# Patient Record
Sex: MALE | Race: BLACK OR AFRICAN AMERICAN | NOT HISPANIC OR LATINO | Employment: OTHER | URBAN - METROPOLITAN AREA
[De-identification: names, ages, dates, MRNs, and addresses within clinical notes are randomized per-mention and may not be internally consistent; named-entity substitution may affect disease eponyms.]

---

## 2021-09-03 ENCOUNTER — OFFICE VISIT (OUTPATIENT)
Dept: FAMILY MEDICINE CLINIC | Facility: CLINIC | Age: 81
End: 2021-09-03
Payer: COMMERCIAL

## 2021-09-03 VITALS
HEIGHT: 74 IN | WEIGHT: 158.2 LBS | DIASTOLIC BLOOD PRESSURE: 62 MMHG | BODY MASS INDEX: 20.3 KG/M2 | OXYGEN SATURATION: 100 % | HEART RATE: 78 BPM | TEMPERATURE: 97.2 F | SYSTOLIC BLOOD PRESSURE: 90 MMHG | RESPIRATION RATE: 16 BRPM

## 2021-09-03 DIAGNOSIS — R42 DIZZINESS: Primary | ICD-10-CM

## 2021-09-03 DIAGNOSIS — Z76.89 ESTABLISHING CARE WITH NEW DOCTOR, ENCOUNTER FOR: ICD-10-CM

## 2021-09-03 DIAGNOSIS — E78.5 HYPERLIPIDEMIA, UNSPECIFIED HYPERLIPIDEMIA TYPE: ICD-10-CM

## 2021-09-03 DIAGNOSIS — I50.9 CONGESTIVE HEART FAILURE, UNSPECIFIED HF CHRONICITY, UNSPECIFIED HEART FAILURE TYPE (HCC): ICD-10-CM

## 2021-09-03 DIAGNOSIS — Z13.1 SCREENING FOR DIABETES MELLITUS: ICD-10-CM

## 2021-09-03 DIAGNOSIS — I25.2 HISTORY OF MI (MYOCARDIAL INFARCTION): ICD-10-CM

## 2021-09-03 DIAGNOSIS — Z12.5 SCREENING FOR PROSTATE CANCER: ICD-10-CM

## 2021-09-03 DIAGNOSIS — I10 HYPERTENSION, UNSPECIFIED TYPE: ICD-10-CM

## 2021-09-03 DIAGNOSIS — Z13.29 SCREENING FOR THYROID DISORDER: ICD-10-CM

## 2021-09-03 PROCEDURE — 93000 ELECTROCARDIOGRAM COMPLETE: CPT | Performed by: NURSE PRACTITIONER

## 2021-09-03 PROCEDURE — 1101F PT FALLS ASSESS-DOCD LE1/YR: CPT | Performed by: NURSE PRACTITIONER

## 2021-09-03 PROCEDURE — 1036F TOBACCO NON-USER: CPT | Performed by: NURSE PRACTITIONER

## 2021-09-03 PROCEDURE — 1160F RVW MEDS BY RX/DR IN RCRD: CPT | Performed by: NURSE PRACTITIONER

## 2021-09-03 PROCEDURE — 3725F SCREEN DEPRESSION PERFORMED: CPT | Performed by: NURSE PRACTITIONER

## 2021-09-03 PROCEDURE — 99204 OFFICE O/P NEW MOD 45 MIN: CPT | Performed by: NURSE PRACTITIONER

## 2021-09-03 RX ORDER — AMIODARONE HYDROCHLORIDE 200 MG/1
200 TABLET ORAL DAILY
COMMUNITY
End: 2022-02-23 | Stop reason: SDUPTHER

## 2021-09-03 RX ORDER — METOPROLOL SUCCINATE 25 MG/1
25 TABLET, EXTENDED RELEASE ORAL DAILY
COMMUNITY
End: 2022-03-29 | Stop reason: SDUPTHER

## 2021-09-03 RX ORDER — DAPAGLIFLOZIN 10 MG/1
10 TABLET, FILM COATED ORAL DAILY
COMMUNITY
End: 2021-12-17 | Stop reason: SDUPTHER

## 2021-09-03 RX ORDER — OMEPRAZOLE 40 MG/1
40 CAPSULE, DELAYED RELEASE ORAL AS NEEDED
COMMUNITY

## 2021-09-03 RX ORDER — ASPIRIN 81 MG/1
81 TABLET ORAL DAILY
COMMUNITY

## 2021-09-03 RX ORDER — SACUBITRIL AND VALSARTAN 49; 51 MG/1; MG/1
1 TABLET, FILM COATED ORAL 2 TIMES DAILY
COMMUNITY
End: 2022-06-27 | Stop reason: SDUPTHER

## 2021-09-03 RX ORDER — FINASTERIDE 5 MG/1
5 TABLET, FILM COATED ORAL DAILY
COMMUNITY
End: 2022-05-09 | Stop reason: SDUPTHER

## 2021-09-03 RX ORDER — ATORVASTATIN CALCIUM 20 MG/1
20 TABLET, FILM COATED ORAL
COMMUNITY
End: 2022-06-07 | Stop reason: SDUPTHER

## 2021-09-03 RX ORDER — DIGOXIN 250 MCG
250 TABLET ORAL DAILY
COMMUNITY
End: 2021-12-17 | Stop reason: DRUGHIGH

## 2021-09-03 NOTE — PROGRESS NOTES
Assessment/Plan:    No problem-specific Assessment & Plan notes found for this encounter  Diagnoses and all orders for this visit:    Dizziness  -     CBC and Platelet; Future  -     Comprehensive metabolic panel; Future  -     POCT ECG    Congestive heart failure, unspecified HF chronicity, unspecified heart failure type [facility])  -     Ambulatory referral to Cardiology; Future    History of MI (myocardial infarction)  -     Ambulatory referral to Cardiology; Future    Establishing care with new doctor, encounter for    Hyperlipidemia, unspecified hyperlipidemia type  -     Lipid panel; Future    Hypertension, unspecified type  -     Microalbumin / creatinine urine ratio; Future  -     Comprehensive metabolic panel; Future    Screening for diabetes mellitus  -     Hemoglobin A1C; Future    Screening for thyroid disorder  -     TSH, 3rd generation with Free T4 reflex; Future    Screening for prostate cancer  -     PSA, Total Screen; Future    Other orders  -     apixaban (Eliquis) 5 mg; Take 5 mg by mouth 2 (two) times a day  -     aspirin (ECOTRIN LOW STRENGTH) 81 mg EC tablet; Take 81 mg by mouth daily  -     amiodarone 200 mg tablet; Take 200 mg by mouth daily  -     finasteride (PROSCAR) 5 mg tablet; Take 5 mg by mouth daily  -     digoxin (LANOXIN) 0 25 mg tablet; Take 250 mcg by mouth daily  -     metoprolol succinate (TOPROL-XL) 25 mg 24 hr tablet; Take 25 mg by mouth daily  -     sacubitril-valsartan (Entresto) 49-51 MG TABS; Take 1 tablet by mouth 2 (two) times a day  -     atorvastatin (LIPITOR) 10 mg tablet; Take 10 mg by mouth daily at bedtime  -     omeprazole (PriLOSEC) 40 MG capsule; Take 40 mg by mouth daily  -     Dapagliflozin Propanediol (Farxiga) 10 MG TABS; Take 10 mg by mouth daily          Subjective:      Patient ID: Grace Mon is a 80 y o  male  Patient presents to office accompanied with daughter for establishment of care  Patient moved to area in June from [place]    Was being seen by PCP and cardiologist in [place], last visit was in May  Patient with history of HTN, hyperlipidemia, MI in 2013, cardiac defibrillator, and CHF  Patient denies any complaints related to chronic conditions  Notes he is still active and can perform his own ADLs  Patient denies recent falls or gait instability  Patient reports episodes of dizziness x's 2-3 weeks  Patient notes he was started on Farxiga 3 weeks ago and since then has been experiencing dizziness  Patient has home BP monitor and notes episodes of hypotension at home (SBP 90's)  Notes intermittent headaches as well  Denies sensation of room spinning, notes sensation of instability with dizziness  Dizziness is observed with changes in position  Patient denies chest pain, SOB  Denies blurry vision, unilateral weakness, slurred speech, syncope  Patient denies ear pain or tinnitus  The following portions of the patient's history were reviewed and updated as appropriate: allergies, current medications, past family history, past medical history, past social history, past surgical history and problem list     Review of Systems   Constitutional: Negative for activity change, chills, fatigue and fever  HENT: Negative for congestion, ear pain, hearing loss, tinnitus and trouble swallowing  Eyes: Negative for photophobia and visual disturbance  Respiratory: Negative for cough, chest tightness and shortness of breath  Cardiovascular: Negative for chest pain, palpitations and leg swelling  Gastrointestinal: Negative for abdominal pain, nausea and vomiting  Genitourinary: Positive for frequency (at night)  Negative for decreased urine volume, difficulty urinating, hematuria and urgency  Musculoskeletal: Negative for arthralgias, back pain, gait problem and myalgias  Skin: Negative for color change and rash  Neurological: Positive for dizziness and headaches   Negative for facial asymmetry, speech difficulty, weakness, light-headedness and numbness  Psychiatric/Behavioral: Negative for confusion and sleep disturbance  The patient is not nervous/anxious  Objective:      BP 90/62 (BP Location: Left arm, Patient Position: Standing)   Pulse 78   Temp (!) 97 2 °F (36 2 °C)   Resp 16   Ht 6' 2" (1 88 m)   Wt 71 8 kg (158 lb 3 2 oz)   SpO2 100%   BMI 20 31 kg/m²          Physical Exam  Vitals reviewed  Constitutional:       General: He is not in acute distress  Appearance: Normal appearance  He is not ill-appearing  HENT:      Head: Normocephalic and atraumatic  Right Ear: Tympanic membrane, ear canal and external ear normal       Left Ear: Tympanic membrane, ear canal and external ear normal       Nose: Nose normal       Mouth/Throat:      Mouth: Mucous membranes are moist       Pharynx: Oropharynx is clear  Eyes:      Extraocular Movements: Extraocular movements intact  Conjunctiva/sclera: Conjunctivae normal       Pupils: Pupils are equal, round, and reactive to light  Cardiovascular:      Rate and Rhythm: Normal rate and regular rhythm  Pulses:           Radial pulses are 2+ on the right side and 2+ on the left side  Heart sounds: Normal heart sounds  No murmur heard  Comments: POCT ECG: NSR, rate 65  Septal infarct, age undetermined  Inferior infarct, age undetermined  Pulmonary:      Effort: No respiratory distress  Breath sounds: Normal breath sounds  No wheezing  Abdominal:      General: Bowel sounds are normal       Palpations: Abdomen is soft  Tenderness: There is no abdominal tenderness  There is no right CVA tenderness or left CVA tenderness  Musculoskeletal:         General: Normal range of motion  Cervical back: Normal range of motion and neck supple  No rigidity or tenderness  Right lower leg: No edema  Left lower leg: No edema  Skin:     General: Skin is warm and dry     Neurological:      Mental Status: He is alert and oriented to person, place, and time     Psychiatric:         Mood and Affect: Mood normal          Behavior: Behavior normal

## 2021-09-03 NOTE — PATIENT INSTRUCTIONS
Stop drinking water 2 hours before bedtime  Check blood pressure first thing in the morning and an hour after taking [place]  Take it slow when changing positions (laying to sitting, sitting to standing)  Get blood work as ordered  Return in 2-3 weeks for re-evaluation  Dizziness, Ambulatory Care   GENERAL INFORMATION:   Dizziness  is a term used to describe a feeling of being off balance or unsteady  Common causes of dizziness are an inner ear fluid imbalance or a lack of oxygen in your blood  Your dizziness may be acute (lasts 3 days or less) or chronic (lasts longer than 3 days)  You may have dizzy spells that last from seconds to a few hours  Common symptoms related to dizziness include the following:   · A feeling that your surroundings are moving even though you are standing still    · Ringing in your ears or hearing loss     · Feeling faint or lightheaded     · Weakness or unsteadiness     · Double vision or eye movements you cannot control    · Nausea or vomiting     · Confusion  Seek immediate care for the following symptoms:   · You have a headache that does not go away with medicine  · You have shaking chills and a fever  · You vomit over and over with no relief  · Your vomit or bowel movements are red or black  · You have pain in your chest, back, or abdomen  · You have numbness, especially in your face, arms, or legs  · You have trouble moving your arms or legs  Treatment for dizziness  depends on the cause of your dizziness  You may need medicines to decrease your dizziness or nausea  You may need to be admitted to the hospital for treatment  Manage your symptoms:  Get up slowly from sitting or lying down  Do not drive or operate machinery when you are dizzy  Follow up with your healthcare provider as directed:  Write down your questions so you remember to ask them during your visits  CARE AGREEMENT:   You have the right to help plan your care   Learn about your health condition and how it may be treated  Discuss treatment options with your caregivers to decide what care you want to receive  You always have the right to refuse treatment  The above information is an  only  It is not intended as medical advice for individual conditions or treatments  Talk to your doctor, nurse or pharmacist before following any medical regimen to see if it is safe and effective for you  © 2014 [address] is for End User's use only and may not be sold, redistributed or otherwise used for commercial purposes  All illustrations and images included in CareNotes® are the copyrighted property of A D A M , Inc  or Yohan Swanson

## 2021-09-07 ENCOUNTER — APPOINTMENT (OUTPATIENT)
Dept: LAB | Facility: HOSPITAL | Age: 81
End: 2021-09-07
Payer: COMMERCIAL

## 2021-09-07 DIAGNOSIS — I10 HYPERTENSION, UNSPECIFIED TYPE: ICD-10-CM

## 2021-09-07 DIAGNOSIS — R42 DIZZINESS: ICD-10-CM

## 2021-09-07 DIAGNOSIS — Z13.29 SCREENING FOR THYROID DISORDER: ICD-10-CM

## 2021-09-07 DIAGNOSIS — Z12.5 SCREENING FOR PROSTATE CANCER: ICD-10-CM

## 2021-09-07 DIAGNOSIS — Z13.1 SCREENING FOR DIABETES MELLITUS: ICD-10-CM

## 2021-09-07 DIAGNOSIS — E78.5 HYPERLIPIDEMIA, UNSPECIFIED HYPERLIPIDEMIA TYPE: ICD-10-CM

## 2021-09-07 LAB
ALBUMIN SERPL BCP-MCNC: 3.7 G/DL (ref 3.5–5)
ALP SERPL-CCNC: 54 U/L (ref 46–116)
ALT SERPL W P-5'-P-CCNC: 35 U/L (ref 12–78)
ANION GAP SERPL CALCULATED.3IONS-SCNC: 6 MMOL/L (ref 4–13)
AST SERPL W P-5'-P-CCNC: 24 U/L (ref 5–45)
BILIRUB SERPL-MCNC: 0.81 MG/DL (ref 0.2–1)
BUN SERPL-MCNC: 18 MG/DL (ref 5–25)
CALCIUM SERPL-MCNC: 8.5 MG/DL (ref 8.3–10.1)
CHLORIDE SERPL-SCNC: 104 MMOL/L (ref 100–108)
CHOLEST SERPL-MCNC: 140 MG/DL (ref 50–200)
CO2 SERPL-SCNC: 28 MMOL/L (ref 21–32)
CREAT SERPL-MCNC: 1.2 MG/DL (ref 0.6–1.3)
CREAT UR-MCNC: 131 MG/DL
ERYTHROCYTE [DISTWIDTH] IN BLOOD BY AUTOMATED COUNT: 15.4 % (ref 11.6–15.1)
EST. AVERAGE GLUCOSE BLD GHB EST-MCNC: 97 MG/DL
GFR SERPL CREATININE-BSD FRML MDRD: 65 ML/MIN/1.73SQ M
GLUCOSE P FAST SERPL-MCNC: 92 MG/DL (ref 65–99)
HBA1C MFR BLD: 5 %
HCT VFR BLD AUTO: 40.9 % (ref 36.5–49.3)
HDLC SERPL-MCNC: 61 MG/DL
HGB BLD-MCNC: 12.6 G/DL (ref 12–17)
LDLC SERPL CALC-MCNC: 64 MG/DL (ref 0–100)
MCH RBC QN AUTO: 27.9 PG (ref 26.8–34.3)
MCHC RBC AUTO-ENTMCNC: 30.8 G/DL (ref 31.4–37.4)
MCV RBC AUTO: 91 FL (ref 82–98)
MICROALBUMIN UR-MCNC: 16 MG/L (ref 0–20)
MICROALBUMIN/CREAT 24H UR: 12 MG/G CREATININE (ref 0–30)
NONHDLC SERPL-MCNC: 79 MG/DL
PLATELET # BLD AUTO: 159 THOUSANDS/UL (ref 149–390)
PMV BLD AUTO: 12 FL (ref 8.9–12.7)
POTASSIUM SERPL-SCNC: 4.1 MMOL/L (ref 3.5–5.3)
PROT SERPL-MCNC: 8.5 G/DL (ref 6.4–8.2)
PSA SERPL-MCNC: 4.6 NG/ML (ref 0–4)
RBC # BLD AUTO: 4.51 MILLION/UL (ref 3.88–5.62)
SODIUM SERPL-SCNC: 138 MMOL/L (ref 136–145)
TRIGL SERPL-MCNC: 74 MG/DL
TSH SERPL DL<=0.05 MIU/L-ACNC: 3.21 UIU/ML (ref 0.36–3.74)
WBC # BLD AUTO: 3.59 THOUSAND/UL (ref 4.31–10.16)

## 2021-09-07 PROCEDURE — 82043 UR ALBUMIN QUANTITATIVE: CPT

## 2021-09-07 PROCEDURE — 85027 COMPLETE CBC AUTOMATED: CPT

## 2021-09-07 PROCEDURE — G0103 PSA SCREENING: HCPCS

## 2021-09-07 PROCEDURE — 84443 ASSAY THYROID STIM HORMONE: CPT

## 2021-09-07 PROCEDURE — 83036 HEMOGLOBIN GLYCOSYLATED A1C: CPT

## 2021-09-07 PROCEDURE — 80053 COMPREHEN METABOLIC PANEL: CPT

## 2021-09-07 PROCEDURE — 36415 COLL VENOUS BLD VENIPUNCTURE: CPT

## 2021-09-07 PROCEDURE — 80061 LIPID PANEL: CPT

## 2021-09-07 PROCEDURE — 82570 ASSAY OF URINE CREATININE: CPT

## 2021-09-10 PROCEDURE — 3288F FALL RISK ASSESSMENT DOCD: CPT | Performed by: NURSE PRACTITIONER

## 2021-09-17 ENCOUNTER — OFFICE VISIT (OUTPATIENT)
Dept: FAMILY MEDICINE CLINIC | Facility: CLINIC | Age: 81
End: 2021-09-17
Payer: COMMERCIAL

## 2021-09-17 VITALS
OXYGEN SATURATION: 100 % | SYSTOLIC BLOOD PRESSURE: 120 MMHG | RESPIRATION RATE: 18 BRPM | BODY MASS INDEX: 20.12 KG/M2 | DIASTOLIC BLOOD PRESSURE: 66 MMHG | WEIGHT: 156.8 LBS | HEART RATE: 74 BPM | TEMPERATURE: 96.8 F | HEIGHT: 74 IN

## 2021-09-17 DIAGNOSIS — Z00.00 ANNUAL PHYSICAL EXAM: Primary | ICD-10-CM

## 2021-09-17 DIAGNOSIS — N40.0 ENLARGED PROSTATE: ICD-10-CM

## 2021-09-17 DIAGNOSIS — E46 PROTEIN-CALORIE MALNUTRITION, UNSPECIFIED SEVERITY (HCC): ICD-10-CM

## 2021-09-17 DIAGNOSIS — Z00.00 HEALTHCARE MAINTENANCE: ICD-10-CM

## 2021-09-17 PROCEDURE — 99397 PER PM REEVAL EST PAT 65+ YR: CPT | Performed by: NURSE PRACTITIONER

## 2021-09-17 PROCEDURE — 1036F TOBACCO NON-USER: CPT | Performed by: NURSE PRACTITIONER

## 2021-09-17 PROCEDURE — 1160F RVW MEDS BY RX/DR IN RCRD: CPT | Performed by: NURSE PRACTITIONER

## 2021-09-17 RX ORDER — B-COMPLEX WITH VITAMIN C
1 TABLET ORAL DAILY
Qty: 90 TABLET | Refills: 1 | Status: SHIPPED | OUTPATIENT
Start: 2021-09-17 | End: 2022-06-07 | Stop reason: SDUPTHER

## 2021-09-17 NOTE — PATIENT INSTRUCTIONS

## 2021-09-17 NOTE — PROGRESS NOTES
[facility] [address] [place] [address]    NAME: Alex Cervantes  AGE: 80 y o  SEX: male  : 1940     DATE: 2021     Assessment and Plan:     Problem List Items Addressed This Visit        Other    Protein-calorie malnutrition, unspecified severity ([address] )      Other Visit Diagnoses     Annual physical exam    -  Primary    Enlarged prostate        Relevant Orders    Ambulatory referral to Urology    Healthcare maintenance        Relevant Medications    Multiple Vitamins-Minerals (multivitamin) tablet      Blood work reviewed  Immunizations and preventive care screenings were discussed with patient today  Appropriate education was printed on patient's after visit summary  Counseling:  Alcohol/drug use: discussed moderation in alcohol intake, the recommendations for healthy alcohol use, and avoidance of illicit drug use  Dental Health: discussed importance of regular tooth brushing, flossing, and dental visits  Injury prevention: discussed safety/seat belts, safety helmets, smoke detectors, carbon dioxide detectors, and smoking near bedding or upholstery  Sexual health: discussed sexually transmitted diseases, partner selection, use of condoms, avoidance of unintended pregnancy, and contraceptive alternatives  · Exercise: the importance of regular exercise/physical activity was discussed  Recommend exercise 3-5 times per week for at least 30 minutes  Return in about 3 months (around 2021) for Recheck  Chief Complaint:     Chief Complaint   Patient presents with    Follow-up      History of Present Illness:     Adult Annual Physical   Patient here for a comprehensive physical exam  The patient reports problems - wants to get off finasteride  Patient states he is having frequency at night and wants to stop taking finasteride    Was started on medication during a hospitalization in [place] where he was told he has an enlarged prostate  Patient was to have urology follow-up in [place], but moved prior to that  Diet and Physical Activity  · Diet/Nutrition: well balanced diet, low fat diet and consuming 3-5 servings of fruits/vegetables daily  · Exercise: no formal exercise  Depression Screening  PHQ-9 Depression Screening    PHQ-9:   Frequency of the following problems over the past two weeks:           General Health  · Sleep: gets 4-6 hours of sleep on average  · Hearing: significantly decreased - right and requires use of hearing aids  · Vision: most recent eye exam <1 year ago, wears glasses and wears glassses for reading only  · Dental: no dental visits for >1 year, brushes teeth once daily and flosses teeth occasionally   Health  · Symptoms include: nocturia     Review of Systems:     Review of Systems   Constitutional: Negative for activity change, appetite change, chills, fatigue and fever  HENT: Negative for congestion, ear pain, sore throat and trouble swallowing  Eyes: Negative for pain and visual disturbance  Respiratory: Negative for cough, chest tightness and shortness of breath  Cardiovascular: Negative for chest pain and palpitations  Gastrointestinal: Negative for abdominal pain, constipation, diarrhea, nausea and vomiting  Endocrine: Negative for polydipsia, polyphagia and polyuria  Genitourinary: Positive for frequency (at night)  Negative for decreased urine volume, difficulty urinating, dysuria, hematuria, testicular pain and urgency  Musculoskeletal: Negative for arthralgias, back pain and myalgias  Skin: Negative for color change and rash  Neurological: Negative for dizziness, syncope, weakness, light-headedness, numbness and headaches  Psychiatric/Behavioral: Negative for confusion  The patient is not nervous/anxious         Past Medical History:     Past Medical History:   Diagnosis Date    [facility]     Hyperlipidemia     Hypertension     [facility]my[facility] infarction) [facility]     Presence of cardiac defibrillator     Prostate disorder       Past Surgical History:     Past Surgical History:   Procedure Laterality Date    CORONARY ANGIOPLASTY WITH STENT PLACEMENT        Family History:     History reviewed  No pertinent family history  Social History:     Social History     Socioeconomic History    Marital status:      Spouse name: None    Number of children: None    Years of education: None    Highest education level: None   Occupational History    None   Tobacco Use    Smoking status: Never Smoker    Smokeless tobacco: Never Used   Vaping Use    Vaping Use: Never used   Substance and Sexual Activity    Alcohol use: Not Currently    Drug use: Never    Sexual activity: None   Other Topics Concern    None   Social History Narrative    None     Social Determinants of Health     Financial Resource Strain:     Difficulty of Paying Living Expenses:    Food Insecurity:     Worried About Running Out of Food in the Last Year:     Ran Out of Food in the Last Year:    Transportation Needs:     Lack of Transportation (Medical):      Lack of Transportation (Non-Medical):    Physical Activity:     Days of Exercise per Week:     Minutes of Exercise per Session:    Stress:     Feeling of Stress :    Social Connections:     Frequency of Communication with Friends and Family:     Frequency of Social Gatherings with Friends and Family:     Attends Buddhism Services:     Active Member of Clubs or Organizations:     Attends Club or Organization Meetings:     Marital Status:    Intimate Partner Violence:     Fear of Current or Ex-Partner:     Emotionally Abused:     Physically Abused:     Sexually Abused:       Current Medications:     Current Outpatient Medications   Medication Sig Dispense Refill    amiodarone 200 mg tablet Take 200 mg by mouth daily      apixaban (Eliquis) 5 mg Take 5 mg by mouth 2 (two) times a day      aspirin (ECOTRIN LOW STRENGTH) 81 mg EC tablet Take 81 mg by mouth daily      atorvastatin (LIPITOR) 20 mg tablet Take 20 mg by mouth daily at bedtime       Dapagliflozin Propanediol (Farxiga) 10 MG TABS Take 10 mg by mouth daily      digoxin (LANOXIN) 0 25 mg tablet Take 250 mcg by mouth daily      finasteride (PROSCAR) 5 mg tablet Take 5 mg by mouth daily      metoprolol succinate (TOPROL-XL) 25 mg 24 hr tablet Take 25 mg by mouth daily      omeprazole (PriLOSEC) 40 MG capsule Take 40 mg by mouth daily      sacubitril-valsartan (Entresto) 49-51 MG TABS Take 1 tablet by mouth 2 (two) times a day      Multiple Vitamins-Minerals (multivitamin) tablet Take 1 tablet by mouth daily 90 tablet 1     No current facility-administered medications for this visit  Allergies:     No Known Allergies   Physical Exam:     /66   Pulse 74   Temp (!) 96 8 °F (36 °C) (Tympanic)   Resp 18   Ht 6' 2" (1 88 m)   Wt 71 1 kg (156 lb 12 8 oz)   SpO2 100%   BMI 20 13 kg/m²     Physical Exam  Vitals reviewed  Constitutional:       General: He is not in acute distress  Appearance: Normal appearance  He is not ill-appearing  HENT:      Head: Normocephalic and atraumatic  Right Ear: Tympanic membrane, ear canal and external ear normal       Left Ear: Tympanic membrane, ear canal and external ear normal       Nose: Nose normal       Mouth/Throat:      Mouth: Mucous membranes are moist       Pharynx: Oropharynx is clear  Eyes:      Extraocular Movements: Extraocular movements intact  Conjunctiva/sclera: Conjunctivae normal       Pupils: Pupils are equal, round, and reactive to light  Neck:      Vascular: No carotid bruit  Cardiovascular:      Rate and Rhythm: Normal rate and regular rhythm  Pulses: Normal pulses  Heart sounds: Normal heart sounds  No murmur heard  Pulmonary:      Effort: Pulmonary effort is normal  No respiratory distress        Breath sounds: Normal breath sounds  No wheezing  Abdominal:      General: Abdomen is flat  Bowel sounds are normal       Palpations: Abdomen is soft  There is no mass  Tenderness: There is no abdominal tenderness  Hernia: No hernia is present  Musculoskeletal:         General: Normal range of motion  Cervical back: Normal range of motion  No rigidity or tenderness  Right lower leg: No edema  Left lower leg: No edema  Comments: Ambulates with cane   Skin:     General: Skin is warm and dry  Capillary Refill: Capillary refill takes less than 2 seconds  Neurological:      Mental Status: He is alert and oriented to person, place, and time     Psychiatric:         Mood and Affect: Mood normal          Behavior: Behavior normal           Abbie Barrera, [address] [facility] [address] [address] [facility]

## 2021-10-01 ENCOUNTER — OFFICE VISIT (OUTPATIENT)
Dept: UROLOGY | Facility: CLINIC | Age: 81
End: 2021-10-01
Payer: COMMERCIAL

## 2021-10-01 VITALS
RESPIRATION RATE: 16 BRPM | WEIGHT: 160 LBS | BODY MASS INDEX: 20.53 KG/M2 | TEMPERATURE: 98.3 F | HEART RATE: 69 BPM | HEIGHT: 74 IN | DIASTOLIC BLOOD PRESSURE: 88 MMHG | SYSTOLIC BLOOD PRESSURE: 150 MMHG

## 2021-10-01 DIAGNOSIS — N40.0 ENLARGED PROSTATE: Primary | ICD-10-CM

## 2021-10-01 LAB — POST-VOID RESIDUAL VOLUME, ML POC: 78 ML

## 2021-10-01 PROCEDURE — 99203 OFFICE O/P NEW LOW 30 MIN: CPT | Performed by: PHYSICIAN ASSISTANT

## 2021-10-01 PROCEDURE — 51798 US URINE CAPACITY MEASURE: CPT | Performed by: PHYSICIAN ASSISTANT

## 2021-10-01 RX ORDER — TAMSULOSIN HYDROCHLORIDE 0.4 MG/1
0.4 CAPSULE ORAL
Qty: 90 CAPSULE | Refills: 3 | Status: CANCELLED | OUTPATIENT
Start: 2021-10-01

## 2021-10-21 ENCOUNTER — CONSULT (OUTPATIENT)
Dept: CARDIOLOGY CLINIC | Facility: CLINIC | Age: 81
End: 2021-10-21
Payer: COMMERCIAL

## 2021-10-21 ENCOUNTER — TELEPHONE (OUTPATIENT)
Dept: CARDIOLOGY CLINIC | Facility: CLINIC | Age: 81
End: 2021-10-21

## 2021-10-21 VITALS
OXYGEN SATURATION: 98 % | DIASTOLIC BLOOD PRESSURE: 78 MMHG | BODY MASS INDEX: 20.53 KG/M2 | WEIGHT: 160 LBS | HEART RATE: 63 BPM | SYSTOLIC BLOOD PRESSURE: 123 MMHG | HEIGHT: 74 IN

## 2021-10-21 DIAGNOSIS — I48.0 PAROXYSMAL ATRIAL FIBRILLATION (HCC): ICD-10-CM

## 2021-10-21 DIAGNOSIS — E78.2 MIXED HYPERLIPIDEMIA: ICD-10-CM

## 2021-10-21 DIAGNOSIS — Z95.810 AICD (AUTOMATIC CARDIOVERTER/DEFIBRILLATOR) PRESENT: ICD-10-CM

## 2021-10-21 DIAGNOSIS — I25.10 ATHEROSCLEROSIS OF NATIVE CORONARY ARTERY OF NATIVE HEART WITHOUT ANGINA PECTORIS: Primary | ICD-10-CM

## 2021-10-21 DIAGNOSIS — I50.20 SYSTOLIC CONGESTIVE HEART FAILURE, UNSPECIFIED HF CHRONICITY (HCC): ICD-10-CM

## 2021-10-21 PROCEDURE — 1160F RVW MEDS BY RX/DR IN RCRD: CPT | Performed by: INTERNAL MEDICINE

## 2021-10-21 PROCEDURE — 1036F TOBACCO NON-USER: CPT | Performed by: INTERNAL MEDICINE

## 2021-10-21 PROCEDURE — 99204 OFFICE O/P NEW MOD 45 MIN: CPT | Performed by: INTERNAL MEDICINE

## 2021-11-12 ENCOUNTER — IN-CLINIC DEVICE VISIT (OUTPATIENT)
Dept: CARDIOLOGY CLINIC | Facility: CLINIC | Age: 81
End: 2021-11-12
Payer: COMMERCIAL

## 2021-11-12 DIAGNOSIS — Z95.810 PRESENCE OF IMPLANTABLE CARDIOVERTER-DEFIBRILLATOR (ICD): Primary | ICD-10-CM

## 2021-11-12 PROCEDURE — 93282 PRGRMG EVAL IMPLANTABLE DFB: CPT | Performed by: INTERNAL MEDICINE

## 2021-12-17 ENCOUNTER — OFFICE VISIT (OUTPATIENT)
Dept: FAMILY MEDICINE CLINIC | Facility: CLINIC | Age: 81
End: 2021-12-17
Payer: COMMERCIAL

## 2021-12-17 VITALS
SYSTOLIC BLOOD PRESSURE: 124 MMHG | BODY MASS INDEX: 21.05 KG/M2 | OXYGEN SATURATION: 99 % | WEIGHT: 164 LBS | DIASTOLIC BLOOD PRESSURE: 78 MMHG | HEIGHT: 74 IN | HEART RATE: 67 BPM

## 2021-12-17 DIAGNOSIS — R09.89 CHEST CONGESTION: Primary | ICD-10-CM

## 2021-12-17 DIAGNOSIS — I50.20 SYSTOLIC HEART FAILURE, UNSPECIFIED HF CHRONICITY (HCC): ICD-10-CM

## 2021-12-17 PROBLEM — I50.9 CONGESTIVE HEART FAILURE (HCC): Status: ACTIVE | Noted: 2021-12-17

## 2021-12-17 PROBLEM — E78.2 MIXED HYPERLIPIDEMIA: Status: ACTIVE | Noted: 2021-12-17

## 2021-12-17 PROBLEM — Z95.810 AICD (AUTOMATIC CARDIOVERTER/DEFIBRILLATOR) PRESENT: Status: ACTIVE | Noted: 2021-12-17

## 2021-12-17 PROBLEM — I48.0 PAROXYSMAL A-FIB (HCC): Status: ACTIVE | Noted: 2021-12-17

## 2021-12-17 PROBLEM — I25.10 ATHEROSCLEROSIS OF NATIVE CORONARY ARTERY OF NATIVE HEART WITHOUT ANGINA PECTORIS: Status: ACTIVE | Noted: 2021-12-17

## 2021-12-17 PROCEDURE — 99214 OFFICE O/P EST MOD 30 MIN: CPT | Performed by: NURSE PRACTITIONER

## 2021-12-17 RX ORDER — PREDNISONE 20 MG/1
40 TABLET ORAL DAILY
Qty: 10 TABLET | Refills: 0 | Status: SHIPPED | OUTPATIENT
Start: 2021-12-17 | End: 2021-12-22

## 2021-12-17 RX ORDER — DAPAGLIFLOZIN 10 MG/1
10 TABLET, FILM COATED ORAL DAILY
Qty: 90 TABLET | Refills: 3 | Status: SHIPPED | OUTPATIENT
Start: 2021-12-17

## 2021-12-17 RX ORDER — ATORVASTATIN CALCIUM 10 MG/1
TABLET, FILM COATED ORAL
COMMUNITY
Start: 2021-11-02 | End: 2021-12-17 | Stop reason: DRUGHIGH

## 2021-12-17 RX ORDER — DIGOXIN 125 MCG
125 TABLET ORAL DAILY
COMMUNITY
Start: 2021-10-02 | End: 2022-07-27 | Stop reason: SDUPTHER

## 2021-12-20 ENCOUNTER — TELEPHONE (OUTPATIENT)
Dept: FAMILY MEDICINE CLINIC | Facility: CLINIC | Age: 81
End: 2021-12-20

## 2021-12-21 ENCOUNTER — HOSPITAL ENCOUNTER (EMERGENCY)
Facility: HOSPITAL | Age: 81
Discharge: HOME | End: 2021-12-21
Attending: EMERGENCY MEDICINE
Payer: COMMERCIAL

## 2021-12-21 ENCOUNTER — APPOINTMENT (EMERGENCY)
Dept: RADIOLOGY | Facility: HOSPITAL | Age: 81
End: 2021-12-21
Payer: COMMERCIAL

## 2021-12-21 VITALS
BODY MASS INDEX: 21.05 KG/M2 | TEMPERATURE: 98.3 F | OXYGEN SATURATION: 99 % | WEIGHT: 164.02 LBS | DIASTOLIC BLOOD PRESSURE: 71 MMHG | SYSTOLIC BLOOD PRESSURE: 111 MMHG | HEIGHT: 74 IN | RESPIRATION RATE: 18 BRPM | HEART RATE: 65 BPM

## 2021-12-21 DIAGNOSIS — R07.9 CHEST PAIN, UNSPECIFIED TYPE: Primary | ICD-10-CM

## 2021-12-21 LAB
2HR DELTA HS TROPONIN: 1 NG/L
ALBUMIN SERPL BCP-MCNC: 3.6 G/DL (ref 3.5–5)
ALP SERPL-CCNC: 59 U/L (ref 46–116)
ALT SERPL W P-5'-P-CCNC: 68 U/L (ref 12–78)
ANION GAP SERPL CALCULATED.3IONS-SCNC: 8 MMOL/L (ref 4–13)
AST SERPL W P-5'-P-CCNC: 30 U/L (ref 5–45)
ATRIAL RATE: 64 BPM
ATRIAL RATE: 66 BPM
ATRIAL RATE: 72 BPM
BACTERIA UR QL AUTO: NORMAL /HPF
BASOPHILS # BLD AUTO: 0.01 THOUSANDS/ΜL (ref 0–0.1)
BASOPHILS NFR BLD AUTO: 0 % (ref 0–1)
BILIRUB SERPL-MCNC: 0.61 MG/DL (ref 0.2–1)
BILIRUB UR QL STRIP: NEGATIVE
BUN SERPL-MCNC: 22 MG/DL (ref 5–25)
CALCIUM SERPL-MCNC: 8.7 MG/DL (ref 8.3–10.1)
CARDIAC TROPONIN I PNL SERPL HS: 10 NG/L
CARDIAC TROPONIN I PNL SERPL HS: 9 NG/L
CHLORIDE SERPL-SCNC: 100 MMOL/L (ref 100–108)
CLARITY UR: CLEAR
CO2 SERPL-SCNC: 28 MMOL/L (ref 21–32)
COLOR UR: YELLOW
CREAT SERPL-MCNC: 1.27 MG/DL (ref 0.6–1.3)
EOSINOPHIL # BLD AUTO: 0 THOUSAND/ΜL (ref 0–0.61)
EOSINOPHIL NFR BLD AUTO: 0 % (ref 0–6)
ERYTHROCYTE [DISTWIDTH] IN BLOOD BY AUTOMATED COUNT: 14.9 % (ref 11.6–15.1)
GFR SERPL CREATININE-BSD FRML MDRD: 52 ML/MIN/1.73SQ M
GLUCOSE SERPL-MCNC: 150 MG/DL (ref 65–140)
GLUCOSE UR STRIP-MCNC: ABNORMAL MG/DL
HCT VFR BLD AUTO: 45.3 % (ref 36.5–49.3)
HGB BLD-MCNC: 14.2 G/DL (ref 12–17)
HGB UR QL STRIP.AUTO: NEGATIVE
IMM GRANULOCYTES # BLD AUTO: 0.02 THOUSAND/UL (ref 0–0.2)
IMM GRANULOCYTES NFR BLD AUTO: 0 % (ref 0–2)
KETONES UR STRIP-MCNC: NEGATIVE MG/DL
LEUKOCYTE ESTERASE UR QL STRIP: ABNORMAL
LYMPHOCYTES # BLD AUTO: 1.03 THOUSANDS/ΜL (ref 0.6–4.47)
LYMPHOCYTES NFR BLD AUTO: 13 % (ref 14–44)
MCH RBC QN AUTO: 27.7 PG (ref 26.8–34.3)
MCHC RBC AUTO-ENTMCNC: 31.3 G/DL (ref 31.4–37.4)
MCV RBC AUTO: 89 FL (ref 82–98)
MONOCYTES # BLD AUTO: 0.49 THOUSAND/ΜL (ref 0.17–1.22)
MONOCYTES NFR BLD AUTO: 6 % (ref 4–12)
NEUTROPHILS # BLD AUTO: 6.34 THOUSANDS/ΜL (ref 1.85–7.62)
NEUTS SEG NFR BLD AUTO: 81 % (ref 43–75)
NITRITE UR QL STRIP: NEGATIVE
NON-SQ EPI CELLS URNS QL MICRO: NORMAL /HPF
NRBC BLD AUTO-RTO: 0 /100 WBCS
P AXIS: 57 DEGREES
P AXIS: 57 DEGREES
P AXIS: 59 DEGREES
PH UR STRIP.AUTO: 6 [PH]
PLATELET # BLD AUTO: 178 THOUSANDS/UL (ref 149–390)
PMV BLD AUTO: 12.1 FL (ref 8.9–12.7)
POTASSIUM SERPL-SCNC: 4.3 MMOL/L (ref 3.5–5.3)
PR INTERVAL: 120 MS
PR INTERVAL: 158 MS
PR INTERVAL: 170 MS
PROT SERPL-MCNC: 7.8 G/DL (ref 6.4–8.2)
PROT UR STRIP-MCNC: NEGATIVE MG/DL
QRS AXIS: -51 DEGREES
QRS AXIS: -53 DEGREES
QRS AXIS: -56 DEGREES
QRSD INTERVAL: 108 MS
QRSD INTERVAL: 110 MS
QRSD INTERVAL: 112 MS
QT INTERVAL: 404 MS
QT INTERVAL: 440 MS
QT INTERVAL: 440 MS
QTC INTERVAL: 416 MS
QTC INTERVAL: 461 MS
QTC INTERVAL: 481 MS
RBC # BLD AUTO: 5.12 MILLION/UL (ref 3.88–5.62)
RBC #/AREA URNS AUTO: NORMAL /HPF
SODIUM SERPL-SCNC: 136 MMOL/L (ref 136–145)
SP GR UR STRIP.AUTO: 1.01 (ref 1–1.03)
T WAVE AXIS: 41 DEGREES
T WAVE AXIS: 41 DEGREES
T WAVE AXIS: 74 DEGREES
UROBILINOGEN UR QL STRIP.AUTO: 0.2 E.U./DL
VENTRICULAR RATE: 64 BPM
VENTRICULAR RATE: 66 BPM
VENTRICULAR RATE: 72 BPM
WBC # BLD AUTO: 7.89 THOUSAND/UL (ref 4.31–10.16)
WBC #/AREA URNS AUTO: NORMAL /HPF

## 2021-12-21 PROCEDURE — 93005 ELECTROCARDIOGRAM TRACING: CPT

## 2021-12-21 PROCEDURE — 99285 EMERGENCY DEPT VISIT HI MDM: CPT

## 2021-12-21 PROCEDURE — 99285 EMERGENCY DEPT VISIT HI MDM: CPT | Performed by: PHYSICIAN ASSISTANT

## 2021-12-21 PROCEDURE — 84484 ASSAY OF TROPONIN QUANT: CPT

## 2021-12-21 PROCEDURE — 85025 COMPLETE CBC W/AUTO DIFF WBC: CPT

## 2021-12-21 PROCEDURE — 81001 URINALYSIS AUTO W/SCOPE: CPT | Performed by: PHYSICIAN ASSISTANT

## 2021-12-21 PROCEDURE — 93010 ELECTROCARDIOGRAM REPORT: CPT | Performed by: INTERNAL MEDICINE

## 2021-12-21 PROCEDURE — 71045 X-RAY EXAM CHEST 1 VIEW: CPT

## 2021-12-21 PROCEDURE — 36415 COLL VENOUS BLD VENIPUNCTURE: CPT

## 2021-12-21 PROCEDURE — 80053 COMPREHEN METABOLIC PANEL: CPT

## 2022-01-14 ENCOUNTER — IMMUNIZATIONS (OUTPATIENT)
Dept: FAMILY MEDICINE CLINIC | Facility: HOSPITAL | Age: 82
End: 2022-01-14

## 2022-01-14 DIAGNOSIS — Z23 ENCOUNTER FOR IMMUNIZATION: Primary | ICD-10-CM

## 2022-01-14 PROCEDURE — 91300 COVID-19 PFIZER VACC 0.3 ML: CPT

## 2022-01-14 PROCEDURE — 0001A COVID-19 PFIZER VACC 0.3 ML: CPT

## 2022-01-30 NOTE — PROGRESS NOTES
2/4/2022      Chief Complaint   Patient presents with    Benign Prostatic Hypertrophy         Assessment and Plan    80 y o  male     1  BPH with LUTS  - Using finasteride monotherapy  - patient states his symptoms have worsened since starting [address]  Discussed this medication does cause excess sugar to be excreted through the urine in could be contributing to his symptoms  Will discuss other medication options with cardiologist when he sees them on 02/17/2022  - Discussed workup of bladder outlet obstruction including cystoscopy and TRUS vs addition of Flomax  Patient would like to try Flomax in addition to his finasteride monotherapy  Medication and side effects reviewed  Prescription sent to pharmacy  - Patient will follow up in 3 months for symptom reassessment  - Call with any questions or concerns   - All questions answered; patient understands and agrees with plan        History of Present Illness  Verlyn Councilman is a 80 y o  male patient with history of BPH with LUTS here for follow up  Patient currently taking finasteride monotherapy with only minimal benefit  States he does continue have bothersome symptoms including urinary frequency, urgency, and nocturia  Patient states he noticed his urinary symptoms have worsened since starting [address] last year  Patient is on this medication for systolic heart failure  Patient had urine testing performed in December which shows large amounts of glucose from this medication  Denies gross hematuria, dysuria, fever, chills, nausea, vomiting  Patient states he has noticed decreased volume of ejaculate since starting finasteride  Denies any other side effects from medication  Review of Systems   Constitutional: Negative for activity change, appetite change, chills and fever  HENT: Negative for congestion, ear pain, sore throat and trouble swallowing  Eyes: Negative for pain and visual disturbance     Respiratory: Negative for cough and shortness of breath  Cardiovascular: Negative for chest pain, palpitations and leg swelling  Gastrointestinal: Negative for abdominal pain, constipation, diarrhea, nausea and vomiting  Genitourinary: Positive for frequency  Negative for difficulty urinating, dysuria, flank pain, hematuria and urgency  Musculoskeletal: Negative for arthralgias, back pain and gait problem  Skin: Negative for color change, rash and wound  Allergic/Immunologic: Negative for immunocompromised state  Neurological: Negative for dizziness, seizures and syncope  Hematological: Does not bruise/bleed easily  Psychiatric/Behavioral: Negative for confusion  All other systems reviewed and are negative  Vitals  Vitals:    02/04/22 0903   BP: 118/60   Weight: 74 4 kg (164 lb)   Height: 6' 2" (1 88 m)       Physical Exam  Constitutional:       General: He is not in acute distress  Appearance: Normal appearance  He is not ill-appearing, toxic-appearing or diaphoretic  HENT:      Head: Normocephalic  Nose: No congestion  Eyes:      General: No scleral icterus  Right eye: No discharge  Left eye: No discharge  Conjunctiva/sclera: Conjunctivae normal       Pupils: Pupils are equal, round, and reactive to light  Pulmonary:      Effort: Pulmonary effort is normal    Musculoskeletal:      Cervical back: Normal range of motion  Skin:     General: Skin is warm and dry  Coloration: Skin is not jaundiced or pale  Findings: No bruising, erythema, lesion or rash  Neurological:      General: No focal deficit present  Mental Status: He is alert and oriented to person, place, and time  Mental status is at baseline  Gait: Gait normal    Psychiatric:         Mood and Affect: Mood normal          Behavior: Behavior normal          Thought Content:  Thought content normal          Judgment: Judgment normal            Past History  Past Medical History:   Diagnosis Date    [facility]est[facility] failure) ([facility] )     Hyperlipidemia     Hypertension     MI [facility]myocardial infarction) ([facility] )     Presence of cardiac defibrillator     Prostate disorder      Social History     Socioeconomic History    Marital status:      Spouse name: None    Number of children: None    Years of education: None    Highest education level: None   Occupational History    None   Tobacco Use    Smoking status: Never Smoker    Smokeless tobacco: Never Used   Vaping Use    Vaping Use: Never used   Substance and Sexual Activity    Alcohol use: Not Currently    Drug use: Never    Sexual activity: None   Other Topics Concern    None   Social History Narrative    None     Social Determinants of Health     Financial Resource Strain: Not on file   Food Insecurity: Not on file   Transportation Needs: Not on file   Physical Activity: Not on file   Stress: Not on file   Social Connections: Not on file   Intimate Partner Violence: Not on file   Housing Stability: Not on file     Social History     Tobacco Use   Smoking Status Never Smoker   Smokeless Tobacco Never Used     History reviewed  No pertinent family history      The following portions of the patient's history were reviewed and updated as appropriate: allergies, current medications, past medical history, past social history, past surgical history and problem list     Results  No results found for this or any previous visit (from the past 1 hour(s)) ]  Lab Results   Component Value Date    PSA 4 6 (H) 09/07/2021     Lab Results   Component Value Date    CALCIUM 8 7 12/21/2021    K 4 3 12/21/2021    CO2 28 12/21/2021     12/21/2021    BUN 22 12/21/2021    CREATININE 1 27 12/21/2021     Lab Results   Component Value Date    WBC 7 89 12/21/2021    HGB 14 2 12/21/2021    HCT 45 3 12/21/2021    MCV 89 12/21/2021     12/21/2021       Alexx Mendez PA-C

## 2022-02-04 ENCOUNTER — OFFICE VISIT (OUTPATIENT)
Dept: UROLOGY | Facility: CLINIC | Age: 82
End: 2022-02-04
Payer: COMMERCIAL

## 2022-02-04 VITALS
HEIGHT: 74 IN | WEIGHT: 164 LBS | BODY MASS INDEX: 21.05 KG/M2 | SYSTOLIC BLOOD PRESSURE: 118 MMHG | DIASTOLIC BLOOD PRESSURE: 60 MMHG

## 2022-02-04 DIAGNOSIS — N40.0 ENLARGED PROSTATE: Primary | ICD-10-CM

## 2022-02-04 PROCEDURE — 99213 OFFICE O/P EST LOW 20 MIN: CPT | Performed by: PHYSICIAN ASSISTANT

## 2022-02-04 RX ORDER — TAMSULOSIN HYDROCHLORIDE 0.4 MG/1
0.4 CAPSULE ORAL
Qty: 30 CAPSULE | Refills: 5 | Status: SHIPPED | OUTPATIENT
Start: 2022-02-04 | End: 2022-05-09 | Stop reason: SDUPTHER

## 2022-02-04 RX ORDER — ATORVASTATIN CALCIUM 10 MG/1
TABLET, FILM COATED ORAL
COMMUNITY
Start: 2022-01-19 | End: 2022-03-29 | Stop reason: SDUPTHER

## 2022-02-17 ENCOUNTER — TELEPHONE (OUTPATIENT)
Dept: CARDIOLOGY CLINIC | Facility: CLINIC | Age: 82
End: 2022-02-17

## 2022-02-17 ENCOUNTER — REMOTE DEVICE CLINIC VISIT (OUTPATIENT)
Dept: CARDIOLOGY CLINIC | Facility: CLINIC | Age: 82
End: 2022-02-17
Payer: COMMERCIAL

## 2022-02-17 DIAGNOSIS — Z95.810 PRESENCE OF AUTOMATIC CARDIOVERTER/DEFIBRILLATOR (AICD): Primary | ICD-10-CM

## 2022-02-17 PROCEDURE — 93296 REM INTERROG EVL PM/IDS: CPT | Performed by: INTERNAL MEDICINE

## 2022-02-17 PROCEDURE — 93295 DEV INTERROG REMOTE 1/2/MLT: CPT | Performed by: INTERNAL MEDICINE

## 2022-02-17 NOTE — TELEPHONE ENCOUNTER
Spoke with pt and he said his wt is 165 Lbs  He has gained a little in the last 4-6 mths( 158 llb) , but not the last 2 weeks  He denies SOB, Edema , or abd bloating  He did want you to know he has not gotten Echo yet because insurance wants him to have it done at the hospital  Please call him back with Central scheduling # in about several hours  He is out right now        Thank you

## 2022-02-17 NOTE — PROGRESS NOTES
Results for orders placed or performed in visit on 02/17/22   Cardiac EP device report    Narrative    SJM SINGLE ICD/NOT MRI CONDITIONAL  MERLIN TRANSMISSION: BATTERY VOLTAGE ADEQUATE (2 4 YRS)  : <1%  ALL AVAILABLE LEAD PARAMETERS WITHIN NORMAL LIMITS  NO SIGNIFICANT HIGH RATE EPISODES  CORVUE IMPEDANCE THRESHOLD CROSSED X 11 DAYS  PT DOES NOT TAKE ANY DIURTICS  PT TAKES DIGOXIN, AMIODARONE, ELIQUIS, ASA 81MG, METOPROLOL SUCC  NO EF IN EPIC  TASK TO HF TEAM  APPROPRIATELY FUNCTIONING ICD    [address] [place] [address]

## 2022-02-17 NOTE — TELEPHONE ENCOUNTER
----- Message from Debbi Bryan RN sent at 2/17/2022 12:01 PM EST -----  Regarding: FW: cor-holly crossed    ----- Message -----  From: Alayna Laureano  Sent: 2/17/2022  11:09 AM EST  To: Debbi Bryan RN  Subject: cor-holly crossed                                  Ray Cummings,  Pts cor-holly crossed x 11 days, pt does not take any diuretics  Pt takes dogoxin, amiodarone, eliquis, asa 81mg, metoprolol succ  No EF in epic  Thanks,  ~Cheryl MERLIN TRANSMISSION: BATTERY VOLTAGE ADEQUATE (2 4 YRS)  : <1%  ALL AVAILABLE LEAD PARAMETERS WITHIN NORMAL LIMITS  NO SIGNIFICANT HIGH RATE EPISODES  CORVUE IMPEDANCE THRESHOLD CROSSED X 11 DAYS  PT DOES NOT TAKE ANY DIURTICS  PT TAKES DIGOXIN, AMIODARONE, ELIQUIS, ASA 81MG, METOPROLOL SUCC  NO EF IN EPIC  TASK TO HF TEAM  APPROPRIATELY FUNCTIONING ICD    [address]

## 2022-02-17 NOTE — TELEPHONE ENCOUNTER
----- Message from [facility] sent at 2/17/2022 11:08 AM EST -----  Regarding: cor-holly crossed  Dalila Rodriguez,  Pts cor-holly crossed x 11 days, pt does not take any diuretics  Pt takes dogoxin, amiodarone, eliquis, asa 81mg, metoprolol succ  No EF in epic  Thanks,  ~Cheryl MERLIN TRANSMISSION: BATTERY VOLTAGE ADEQUATE (2 4 YRS)  : <1%  ALL AVAILABLE LEAD PARAMETERS WITHIN NORMAL LIMITS  NO SIGNIFICANT HIGH RATE EPISODES  CORVUE IMPEDANCE THRESHOLD CROSSED X 11 DAYS  PT DOES NOT TAKE ANY DIURTICS  PT TAKES DIGOXIN, AMIODARONE, ELIQUIS, ASA 81MG, METOPROLOL SUCC  NO EF IN EPIC  TASK TO HF TEAM  APPROPRIATELY FUNCTIONING ICD    [address]

## 2022-02-17 NOTE — TELEPHONE ENCOUNTER
Spoke to the patient and daughter over the phone    Patient complain of please schedule his visit with me next week on wednesday

## 2022-02-23 ENCOUNTER — OFFICE VISIT (OUTPATIENT)
Dept: CARDIOLOGY CLINIC | Facility: CLINIC | Age: 82
End: 2022-02-23
Payer: COMMERCIAL

## 2022-02-23 VITALS
BODY MASS INDEX: 21.17 KG/M2 | SYSTOLIC BLOOD PRESSURE: 106 MMHG | HEIGHT: 74 IN | HEART RATE: 62 BPM | OXYGEN SATURATION: 98 % | WEIGHT: 165 LBS | RESPIRATION RATE: 16 BRPM | DIASTOLIC BLOOD PRESSURE: 72 MMHG

## 2022-02-23 DIAGNOSIS — E78.2 MIXED HYPERLIPIDEMIA: ICD-10-CM

## 2022-02-23 DIAGNOSIS — I25.10 ATHEROSCLEROSIS OF NATIVE CORONARY ARTERY OF NATIVE HEART WITHOUT ANGINA PECTORIS: Primary | ICD-10-CM

## 2022-02-23 DIAGNOSIS — I48.0 PAROXYSMAL A-FIB (HCC): ICD-10-CM

## 2022-02-23 DIAGNOSIS — Z95.810 AICD (AUTOMATIC CARDIOVERTER/DEFIBRILLATOR) PRESENT: ICD-10-CM

## 2022-02-23 PROCEDURE — 1036F TOBACCO NON-USER: CPT | Performed by: INTERNAL MEDICINE

## 2022-02-23 PROCEDURE — 1160F RVW MEDS BY RX/DR IN RCRD: CPT | Performed by: INTERNAL MEDICINE

## 2022-02-23 PROCEDURE — 99214 OFFICE O/P EST MOD 30 MIN: CPT | Performed by: INTERNAL MEDICINE

## 2022-02-23 RX ORDER — AMIODARONE HYDROCHLORIDE 200 MG/1
200 TABLET ORAL DAILY
Qty: 90 TABLET | Refills: 3 | Status: SHIPPED | OUTPATIENT
Start: 2022-02-23

## 2022-02-23 NOTE — PROGRESS NOTES
[facility] [address] [address]  Cardiology Follow Up    Alex Cervantes  1940  [id number]      1  Atherosclerosis of native coronary artery of native heart without angina pectoris  amiodarone 200 mg tablet   2  AICD (automatic cardioverter/defibrillator) present  amiodarone 200 mg tablet   3  Paroxysmal A-fib (HCC)  amiodarone 200 mg tablet   4  Mixed hyperlipidemia         Chief Complaint   Patient presents with    Follow-up       Interval History:   51-year-old male with coronary artery status post MI in 2013 status post PCI details unknown, heart failure status post AICD at the same time in 2013 at [facility] in [place], hyperlipidemia, AFib on Eliquis presented for further evaluation after AICD interrogation showed optivolume cross    Patient had AICD interrogated on 02/17/2022 which showed normally functioning device with ventricular paced less than 1% battery life 2 4 years  It showed core view impedance threshold crossed for 11 days  Patient at present time denies chest pain, shortness of breath, palpitation, dizziness, orthopnea, leg edema or loss of consciousness  He denies any chest pain or shortness of breath on ambulation  He had experience dizziness mostly during postural change  No dizziness during ambulation but no arrhythmia on pacemaker interrogation  He was recently started on Connie Stapler by primary care  As per patient home blood pressure stays around 114/60-70    No recent labs available for review  Current medications reviewed    (from initial clinic visit: patient has been [place] cardi[place]  He has seen his primary cardiologist before 6 months  at present time patient is doing okay  He denies any chest pain, shortness of breath, palpitation, dizziness, orthopnea, leg edema or loss of consciousness    He has occasionally noted fatigue on exertion   For last year and half patient has lost almost 24 lb after his wife   He has regained few  Lb since then and is more active after he moved to [place]  no chest pain or shortness of breath on exertion      As per patient his ICD was interrogated during last clinic visit with primary cardiologist in [place]   ( patient has AICD shock  Before 5 year) He also had a stress test before 6 months and as per patient everything was okay on stress test)    Patient had echo ordered on last visit but not done as it is not covered at [facility] facility  I did speak to vascular testing  staff and they will look into it    Review of Systems: All review of system negative except as mentioned above  Patient Active Problem List   Diagnosis    Protein-calorie malnutrition, unspecified severity ([facility] )    Atherosclerosis of native coronary artery of native heart without angina pectoris    Paroxysmal A-fib ([facility] )    Mixed hyperlipidemia    AICD (automatic cardioverter/defibrillator) present    Systolic heart failure ([facility] )     Past Medical History:   Diagnosis Date    [facility] failure) ([facility])     Hyperlipidemia     Hypertension     MI (myocardial infarction) ([facility] )     Presence of cardiac defibrillator     Prostate disorder      Social History     Socioeconomic History    Marital status:       Spouse name: Not on file    Number of children: Not on file    Years of education: Not on file    Highest education level: Not on file   Occupational History    Not on file   Tobacco Use    Smoking status: Never Smoker    Smokeless tobacco: Never Used   Vaping Use    Vaping Use: Never used   Substance and Sexual Activity    Alcohol use: Not Currently    Drug use: Never    Sexual activity: Not on file   Other Topics Concern    Not on file   Social History Narrative    Not on file     Social Determinants of Health     Financial Resource Strain: Not on file   Food Insecurity: Not on file   Transportation Needs: Not on file   Physical Activity: Not on file   Stress: Not on file   Social Connections: Not on file   Intimate Partner Violence: Not on file   Housing Stability: Not on file      History reviewed  No pertinent family history    Past Surgical History:   Procedure Laterality Date    CORONARY ANGIOPLASTY WITH STENT PLACEMENT         Current Outpatient Medications:     amiodarone 200 mg tablet, Take 1 tablet (200 mg total) by mouth daily, Disp: 90 tablet, Rfl: 3    apixaban (Eliquis) 5 mg, Take 5 mg by mouth 2 (two) times a day, Disp: , Rfl:     aspirin (ECOTRIN LOW STRENGTH) 81 mg EC tablet, Take 81 mg by mouth daily, Disp: , Rfl:     atorvastatin (LIPITOR) 20 mg tablet, Take 20 mg by mouth daily at bedtime , Disp: , Rfl:     Dapagliflozin Propanediol (Farxiga) 10 MG TABS, Take 1 tablet (10 mg total) by mouth daily, Disp: 90 tablet, Rfl: 3    digoxin (LANOXIN) 0 125 mg tablet, Take 125 mcg by mouth daily  , Disp: , Rfl:     finasteride (PROSCAR) 5 mg tablet, Take 5 mg by mouth daily, Disp: , Rfl:     metoprolol succinate (TOPROL-XL) 25 mg 24 hr tablet, Take 25 mg by mouth daily, Disp: , Rfl:     Multiple Vitamins-Minerals (multivitamin) tablet, Take 1 tablet by mouth daily, Disp: 90 tablet, Rfl: 1    omeprazole (PriLOSEC) 40 MG capsule, Take 40 mg by mouth daily, Disp: , Rfl:     sacubitril-valsartan (Entresto) 49-51 MG TABS, Take 1 tablet by mouth 2 (two) times a day, Disp: , Rfl:     tamsulosin (FLOMAX) 0 4 mg, Take 1 capsule (0 4 mg total) by mouth daily with dinner, Disp: 30 capsule, Rfl: 5    atorvastatin (LIPITOR) 10 mg tablet, , Disp: , Rfl:   No Known Allergies    Labs:  Admission on 12/21/2021, Discharged on 12/21/2021   Component Date Value    WBC 12/21/2021 7 89     RBC 12/21/2021 5 12     Hemoglobin 12/21/2021 14 2     Hematocrit 12/21/2021 45 3     MCV 12/21/2021 89     MCH 12/21/2021 27 7     MCHC 12/21/2021 31 3*    RDW 12/21/2021 14 9     MPV 12/21/2021 12 1     Platelets 52/46/2893 178     nRBC 12/21/2021 0     Neutrophils Relative 12/21/2021 81*    Immat GRANS % 12/21/2021 0     Lymphocytes Relative 12/21/2021 13*    Monocytes Relative 12/21/2021 6     Eosinophils Relative 12/21/2021 0     Basophils Relative 12/21/2021 0     Neutrophils Absolute 12/21/2021 6 34     Immature Grans Absolute 12/21/2021 0 02     Lymphocytes Absolute 12/21/2021 1 03     Monocytes Absolute 12/21/2021 0 49     Eosinophils Absolute 12/21/2021 0 00     Basophils Absolute 12/21/2021 0 01     Sodium 12/21/2021 136     Potassium 12/21/2021 4 3     Chloride 12/21/2021 100     CO2 12/21/2021 28     ANION GAP 12/21/2021 8     BUN 12/21/2021 22     Creatinine 12/21/2021 1 27     Glucose 12/21/2021 150*    Calcium 12/21/2021 8 7     AST 12/21/2021 30     ALT 12/21/2021 68     Alkaline Phosphatase 12/21/2021 59     Total Protein 12/21/2021 7 8     Albumin 12/21/2021 3 6     Total Bilirubin 12/21/2021 0 61     eGFR 12/21/2021 52     hs TnI 0hr 12/21/2021 9     hs TnI 2hr 12/21/2021 10     Delta 2hr hsTnI 12/21/2021 1     Color, UA 12/21/2021 Yellow     Clarity, UA 12/21/2021 Clear     Specific Gravity, UA 12/21/2021 1 010     pH, UA 12/21/2021 6 0     Leukocytes, UA 12/21/2021 Elevated glucose may cause decreased leukocyte values   See urine microscopic for [facility] result/*    Nitrite, UA 12/21/2021 Negative     Protein, UA 12/21/2021 Negative     Glucose, UA 12/21/2021 >=1000 (1%)*    Ketones, UA 12/21/2021 Negative     Urobilinogen, UA 12/21/2021 0 2     Bilirubin, UA 12/21/2021 Negative     Blood, UA 12/21/2021 Negative     RBC, UA 12/21/2021 None Seen     WBC, UA 12/21/2021 None Seen     Epithelial Cells 12/21/2021 None Seen     Bacteria, UA 12/21/2021 None Seen     Ventricular Rate 12/21/2021 66     Atrial Rate 12/21/2021 66     IN Interval 12/21/2021 170     QRSD Interval 12/21/2021 110     QT Interval 12/21/2021 440     QTC Interval 12/21/2021 461     P Axis 12/21/2021 57     QRS Axis 12/21/2021 -46     T Wave Axis 12/21/2021 74     Ventricular Rate 12/21/2021 64     Atrial Rate 12/21/2021 64     TX Interval 12/21/2021 158     QRSD Interval 12/21/2021 112     QT Interval 12/21/2021 404     QTC Interval 12/21/2021 416     P Axis 12/21/2021 59     QRS Axis 12/21/2021 -48     T Wave Axis 12/21/2021 41     Ventricular Rate 12/21/2021 72     Atrial Rate 12/21/2021 72     TX Interval 12/21/2021 120     QRSD Interval 12/21/2021 108     QT Interval 12/21/2021 440     QTC Interval 12/21/2021 481     P Axis 12/21/2021 57     QRS Axis 12/21/2021 -64     T Wave Axis 12/21/2021 41    Office Visit on 10/01/2021   Component Date Value    POST-VOID RESIDUAL VOLUM* 10/01/2021 78    Appointment on 09/07/2021   Component Date Value    PSA 09/07/2021 4 6*    Creatinine, Ur 09/07/2021 131 0     Microalbum  ,U,Random 09/07/2021 16 0     Microalb Creat Ratio 09/07/2021 12     Hemoglobin A1C 09/07/2021 5 0     EAG 09/07/2021 97     WBC 09/07/2021 3 59*    RBC 09/07/2021 4 51     Hemoglobin 09/07/2021 12 6     Hematocrit 09/07/2021 40 9     MCV 09/07/2021 91     MCH 09/07/2021 27 9     MCHC 09/07/2021 30 8*    RDW 09/07/2021 15 4*    Platelets 99/02/2757 159     MPV 09/07/2021 12 0     Sodium 09/07/2021 138     Potassium 09/07/2021 4 1     Chloride 09/07/2021 104     CO2 09/07/2021 28     ANION GAP 09/07/2021 6     BUN 09/07/2021 18     Creatinine 09/07/2021 1 20     Glucose, Fasting 09/07/2021 92     Calcium 09/07/2021 8 5     AST 09/07/2021 24     ALT 09/07/2021 35     Alkaline Phosphatase 09/07/2021 54     Total Protein 09/07/2021 8 5*    Albumin 09/07/2021 3 7     Total Bilirubin 09/07/2021 0 81     eGFR 09/07/2021 65     TSH 3RD GENERATON 09/07/2021 3  213     Cholesterol 09/07/2021 140     Triglycerides 09/07/2021 74     HDL, Direct 09/07/2021 61     LDL Calculated 09/07/2021 64     Non-HDL-Chol (CHOL-HDL) 09/07/2021 79      Imaging: Cardiac EP device report    Result Date: 2/17/2022  Narrative: SJM SINGLE ICD/NOT MRI CONDITIONAL MERLIN TRANSMISSION: BATTERY VOLTAGE ADEQUATE (2 4 YRS)  : <1%  ALL AVAILABLE LEAD PARAMETERS WITHIN NORMAL LIMITS  NO SIGNIFICANT HIGH RATE EPISODES  CORVUE IMPEDANCE THRESHOLD CROSSED X 11 DAYS  PT DOES NOT TAKE ANY DIURTICS  PT TAKES DIGOXIN, AMIODARONE, ELIQUIS, ASA 81MG, METOPROLOL SUCC  NO EF IN EPIC  TASK TO HF TEAM  APPROPRIATELY FUNCTIONING ICD  [address]       Physical Exam:  General:  Walks with a cane, awake, alert and oriented x3, not in distress  Neck: supple, no JVD  Eyes: PERRL, conjunctiva normal  Lungs:  Bilateral air entry positive, no wheeze/rhonchi or crackle  Heart:  S1-S2 normal, no murmur  Abdomen:  Soft ,nondistended ,nontender, bowel sounds positive  Extremities:  No leg edema, no deformity, ROM normal  Neuro:  Moving all extremities, speech clear  Skin: warm, no rash    /72 (BP Location: Left arm, Patient Position: Sitting, Cuff Size: Standard)   Pulse 62   Resp 16   Ht 6' 2" (1 88 m)   Wt 74 8 kg (165 lb)   SpO2 98%   BMI 21 18 kg/m²           Assessment:    1  Coronary artery disease status post PCI in 2013 for MI details unknown  At present time denies chest pain or shortness of breath    2  Congestive heart failure  Compensated at present time    3  Paroxysmal AFib on Eliquis and amiodarone(TSH and AST ALT in October 2021 within normal limits)  4  Status post AICD in 2013  Recent interrogation showed normally functioning device   5  Hyperlipidemia    Recommendations:   At present time patient is doing okay from cardiology standpoint  Clinically he is not in heart failure    Patient to continue aspirin 81, Eliquis 5 mg twice a day, amiodarone 200 mg daily, Lipitor 20, digoxin 125 mcg, Toprol-XL 25 and Entresto 49/51    Patient was advised to monitor blood pressure at home and check it 3 hours after taking medication to monitor for any hypotension  At present time patient dizziness appears to be due to benign positional vertigo    2D echo is pending  Return to clinic in 6 months  Patient to follow-up in device Clinic as per schedule  Above all discussed with patient and daughter in the clinic today    They understands and agrees

## 2022-03-06 DIAGNOSIS — Z00.00 ENCOUNTER FOR GENERAL ADULT MEDICAL EXAMINATION WITHOUT ABNORMAL FINDINGS: ICD-10-CM

## 2022-03-08 RX ORDER — MULTIVITAMIN WITH FOLIC ACID 400 MCG
TABLET ORAL
Qty: 90 TABLET | Refills: 1 | Status: SHIPPED | OUTPATIENT
Start: 2022-03-08 | End: 2022-08-02

## 2022-03-23 ENCOUNTER — REMOTE DEVICE CLINIC VISIT (OUTPATIENT)
Dept: CARDIOLOGY CLINIC | Facility: CLINIC | Age: 82
End: 2022-03-23
Payer: COMMERCIAL

## 2022-03-23 DIAGNOSIS — Z95.810 PRESENCE OF AUTOMATIC CARDIOVERTER/DEFIBRILLATOR (AICD): Primary | ICD-10-CM

## 2022-03-23 PROCEDURE — G2066 INTER DEVC REMOTE 30D: HCPCS | Performed by: INTERNAL MEDICINE

## 2022-03-23 PROCEDURE — 93297 REM INTERROG DEV EVAL ICPMS: CPT | Performed by: INTERNAL MEDICINE

## 2022-03-23 NOTE — PROGRESS NOTES
Results for orders placed or performed in visit on 03/23/22   Cardiac EP device report    Narrative    SJM SINGLE ICD/NOT MRI CONDITIONAL  MERLIN TRANSMISSION - CORVUE ONLY: CORVUE IMPEDANCE THRESHOLD CROSSED 11 DAYS   1%  ALL AVAILABLE LEAD PARAMETERS WITHIN NORMAL LIMITS  NO SIGNIFICANT HIGH RATE EPISODES  NORMAL DEVICE FUNCTION  ---TELLO

## 2022-03-24 ENCOUNTER — HOSPITAL ENCOUNTER (OUTPATIENT)
Dept: NON INVASIVE DIAGNOSTICS | Facility: CLINIC | Age: 82
Discharge: HOME | End: 2022-03-24
Payer: COMMERCIAL

## 2022-03-24 VITALS
HEIGHT: 74 IN | SYSTOLIC BLOOD PRESSURE: 106 MMHG | BODY MASS INDEX: 21.17 KG/M2 | DIASTOLIC BLOOD PRESSURE: 72 MMHG | HEART RATE: 62 BPM | WEIGHT: 165 LBS

## 2022-03-24 DIAGNOSIS — I50.20 SYSTOLIC CONGESTIVE HEART FAILURE, UNSPECIFIED HF CHRONICITY (HCC): ICD-10-CM

## 2022-03-24 LAB
AORTIC ROOT: 4.3 CM
AORTIC VALVE MEAN VELOCITY: 9.7 M/S
APICAL FOUR CHAMBER EJECTION FRACTION: 56 %
ASCENDING AORTA: 4.3 CM (ref 2.01–3.01)
AV AREA BY CONTINUOUS VTI: 1.8 CM2
AV AREA PEAK VELOCITY: 1.8 CM2
AV LVOT MEAN GRADIENT: 1 MMHG
AV LVOT PEAK GRADIENT: 2 MMHG
AV MEAN GRADIENT: 5 MMHG
AV PEAK GRADIENT: 9 MMHG
AV REGURGITATION PRESSURE HALF TIME: 550 MS
AV VALVE AREA: 1.85 CM2
AV VELOCITY RATIO: 0.43
DOP CALC AO PEAK VEL: 1.47 M/S
DOP CALC AO VTI: 29.93 CM
DOP CALC LVOT AREA: 4.15 CM2
DOP CALC LVOT DIAMETER: 2.3 CM
DOP CALC LVOT PEAK VEL VTI: 13.31 CM
DOP CALC LVOT PEAK VEL: 0.63 M/S
DOP CALC LVOT STROKE INDEX: 27.5 ML/M2
DOP CALC LVOT STROKE VOLUME: 55.27 CM3
E WAVE DECELERATION TIME: 278 MS
FRACTIONAL SHORTENING: 26 % (ref 28–44)
INTERVENTRICULAR SEPTUM IN DIASTOLE (PARASTERNAL SHORT AXIS VIEW): 1.2 CM
INTERVENTRICULAR SEPTUM: 1.2 CM (ref 0.53–0.99)
LAAS-AP2: 24.1 CM2
LAAS-AP4: 16.3 CM2
LEFT ATRIUM AREA SYSTOLE SINGLE PLANE A4C: 16.3 CM2
LEFT ATRIUM SIZE: 3.5 CM
LEFT INTERNAL DIMENSION IN SYSTOLE: 3.4 CM (ref 2.83–4.28)
LEFT VENTRICULAR INTERNAL DIMENSION IN DIASTOLE: 4.6 CM (ref 4.64–6.91)
LEFT VENTRICULAR POSTERIOR WALL IN END DIASTOLE: 1.1 CM (ref 0.52–0.98)
LEFT VENTRICULAR STROKE VOLUME: 49 ML
LVSV (TEICH): 49 ML
MITRAL REGURGITATION PEAK VELOCITY: 3.45 M/S
MITRAL VALVE REGURGITANT PEAK GRADIENT: 48 MMHG
MV E'TISSUE VEL-SEP: 5 CM/S
MV PEAK A VEL: 0.49 M/S
MV PEAK E VEL: 39 CM/S
MV STENOSIS PRESSURE HALF TIME: 80 MS
MV VALVE AREA P 1/2 METHOD: 2.75 CM2
RIGHT ATRIAL 2D VOLUME: 68 ML
RIGHT ATRIUM AREA SYSTOLE A4C: 23.3 CM2
RIGHT VENTRICLE ID DIMENSION: 4.6 CM
SL CV AV DECELERATION TIME RETROGRADE: 1896 MS
SL CV AV PEAK GRADIENT RETROGRADE: 41 MMHG
SL CV LEFT ATRIUM LENGTH A2C: 5.5 CM
SL CV PED ECHO LEFT VENTRICLE DIASTOLIC VOLUME (MOD BIPLANE) 2D: 96 ML
SL CV PED ECHO LEFT VENTRICLE SYSTOLIC VOLUME (MOD BIPLANE) 2D: 47 ML
TR MAX PG: 31 MMHG
TR PEAK VELOCITY: 2.8 M/S
TRICUSPID VALVE PEAK REGURGITATION VELOCITY: 2.8 M/S
Z-SCORE OF ASCENDING AORTA: 7.08
Z-SCORE OF INTERVENTRICULAR SEPTUM IN END DIASTOLE: 3.72
Z-SCORE OF LEFT VENTRICULAR DIMENSION IN END DIASTOLE: -2.08
Z-SCORE OF LEFT VENTRICULAR DIMENSION IN END SYSTOLE: -0.18
Z-SCORE OF LEFT VENTRICULAR POSTERIOR WALL IN END DIASTOLE: 2.99

## 2022-03-24 PROCEDURE — 93306 TTE W/DOPPLER COMPLETE: CPT | Performed by: INTERNAL MEDICINE

## 2022-03-24 PROCEDURE — 93306 TTE W/DOPPLER COMPLETE: CPT

## 2022-03-29 DIAGNOSIS — I10 HYPERTENSION, UNSPECIFIED TYPE: ICD-10-CM

## 2022-03-29 DIAGNOSIS — E78.2 MIXED HYPERLIPIDEMIA: Primary | ICD-10-CM

## 2022-03-29 RX ORDER — METOPROLOL SUCCINATE 25 MG/1
25 TABLET, EXTENDED RELEASE ORAL DAILY
Qty: 90 TABLET | Refills: 3 | Status: SHIPPED | OUTPATIENT
Start: 2022-03-29

## 2022-03-29 RX ORDER — ATORVASTATIN CALCIUM 20 MG/1
20 TABLET, FILM COATED ORAL DAILY
Qty: 90 TABLET | Refills: 3 | Status: SHIPPED | OUTPATIENT
Start: 2022-03-29

## 2022-04-05 ENCOUNTER — TELEPHONE (OUTPATIENT)
Dept: CARDIOLOGY CLINIC | Facility: CLINIC | Age: 82
End: 2022-04-05

## 2022-04-05 NOTE — TELEPHONE ENCOUNTER
Gwen Walker patient's daughter called  Dr Farida Carmona wanted Lencho Stone to have a CT scan but it is not scheduled until May 6th  His daughter wants to know if this is okay or does he want it sooner        [phone]

## 2022-04-11 ENCOUNTER — TELEPHONE (OUTPATIENT)
Dept: FAMILY MEDICINE CLINIC | Facility: CLINIC | Age: 82
End: 2022-04-11

## 2022-04-11 NOTE — TELEPHONE ENCOUNTER
Patient is having a CT on May 6th, they're requesting for him to have Lab order prior to test  Bun Creatinine   Please advise, Thank you

## 2022-04-11 NOTE — TELEPHONE ENCOUNTER
The ct scan was not ordered by me, it was ordered by Dr Nafisa Bello, cardiology  Should contact that office for blood work

## 2022-04-14 NOTE — TELEPHONE ENCOUNTER
Patient's daughter Kvng Dues contacting office [phone] to inquire how many days prior for the BUN and creatine to be done  Patient's CT is scheduled for May 6th  Also, the order needs to be put into the system

## 2022-04-28 NOTE — TELEPHONE ENCOUNTER
Emilie Linder from Radiology contacting office [phone] patient will be having a CT of the chest with contrast next week  Patient needs to have blood work done prior for a BUN, creatinine or BMP  Order needs to be put into Epic, patient also needs to be notified he has to go for blood work prior

## 2022-04-28 NOTE — TELEPHONE ENCOUNTER
I ordered BMP    Please advise patient to do BMP prior to his CT scan as required by Radiology Department

## 2022-05-03 ENCOUNTER — APPOINTMENT (OUTPATIENT)
Dept: LAB | Facility: HOSPITAL | Age: 82
End: 2022-05-03
Payer: COMMERCIAL

## 2022-05-03 DIAGNOSIS — I10 PRIMARY HYPERTENSION: ICD-10-CM

## 2022-05-03 LAB
ANION GAP SERPL CALCULATED.3IONS-SCNC: 7 MMOL/L (ref 4–13)
BUN SERPL-MCNC: 14 MG/DL (ref 5–25)
CALCIUM SERPL-MCNC: 8.9 MG/DL (ref 8.3–10.1)
CHLORIDE SERPL-SCNC: 103 MMOL/L (ref 100–108)
CO2 SERPL-SCNC: 28 MMOL/L (ref 21–32)
CREAT SERPL-MCNC: 1.23 MG/DL (ref 0.6–1.3)
GFR SERPL CREATININE-BSD FRML MDRD: 54 ML/MIN/1.73SQ M
GLUCOSE P FAST SERPL-MCNC: 107 MG/DL (ref 65–99)
POTASSIUM SERPL-SCNC: 4.2 MMOL/L (ref 3.5–5.3)
SODIUM SERPL-SCNC: 138 MMOL/L (ref 136–145)

## 2022-05-03 PROCEDURE — 36415 COLL VENOUS BLD VENIPUNCTURE: CPT

## 2022-05-03 PROCEDURE — 80048 BASIC METABOLIC PNL TOTAL CA: CPT

## 2022-05-06 ENCOUNTER — HOSPITAL ENCOUNTER (OUTPATIENT)
Dept: CT IMAGING | Facility: CLINIC | Age: 82
Discharge: HOME | End: 2022-05-06
Payer: COMMERCIAL

## 2022-05-06 DIAGNOSIS — I77.810 ASCENDING AORTA DILATATION (HCC): ICD-10-CM

## 2022-05-06 PROCEDURE — 71260 CT THORAX DX C+: CPT

## 2022-05-06 PROCEDURE — G1004 CDSM NDSC: HCPCS

## 2022-05-06 RX ADMIN — IOHEXOL 85 ML: 350 INJECTION, SOLUTION INTRAVENOUS at 10:35

## 2022-05-09 ENCOUNTER — OFFICE VISIT (OUTPATIENT)
Dept: UROLOGY | Facility: CLINIC | Age: 82
End: 2022-05-09
Payer: COMMERCIAL

## 2022-05-09 ENCOUNTER — TELEPHONE (OUTPATIENT)
Dept: CARDIOLOGY CLINIC | Facility: CLINIC | Age: 82
End: 2022-05-09

## 2022-05-09 VITALS
BODY MASS INDEX: 21.3 KG/M2 | HEIGHT: 74 IN | SYSTOLIC BLOOD PRESSURE: 108 MMHG | DIASTOLIC BLOOD PRESSURE: 72 MMHG | WEIGHT: 166 LBS

## 2022-05-09 DIAGNOSIS — N40.0 ENLARGED PROSTATE: ICD-10-CM

## 2022-05-09 PROCEDURE — 1160F RVW MEDS BY RX/DR IN RCRD: CPT | Performed by: PHYSICIAN ASSISTANT

## 2022-05-09 PROCEDURE — 1036F TOBACCO NON-USER: CPT | Performed by: PHYSICIAN ASSISTANT

## 2022-05-09 PROCEDURE — 99213 OFFICE O/P EST LOW 20 MIN: CPT | Performed by: PHYSICIAN ASSISTANT

## 2022-05-09 RX ORDER — TAMSULOSIN HYDROCHLORIDE 0.4 MG/1
0.4 CAPSULE ORAL
Qty: 90 CAPSULE | Refills: 3 | Status: SHIPPED | OUTPATIENT
Start: 2022-05-09

## 2022-05-09 RX ORDER — FINASTERIDE 5 MG/1
5 TABLET, FILM COATED ORAL DAILY
Qty: 90 TABLET | Refills: 3 | Status: SHIPPED | OUTPATIENT
Start: 2022-05-09

## 2022-05-09 NOTE — PROGRESS NOTES
5/9/2022      No chief complaint on file  Assessment and Plan    80 y o  male     1  BPH with LUTS  - Doing well with finasteride and Flomax dual medical therapy  - Denies any new or worsening symptoms  - Follow up in 1 year for symptom reassessment  - Call with any questions or concerns  - All questions answered; patient understands and agrees with plan        History of Present Illness  Elliot Cervantes is a 80 y o  male patient with history of BPH with LUTS here for follow up  Patient doing well with Flomax and finasteride dual medical therapy  Denies any new or worsening symptoms  Denies frequency, urgency, dysuria, gross hematuria, flank pain, suprapubic pain, fevers, chills  Patient does not wish to have surgery for prostate  Overall happy with urination at this time  Review of Systems   Constitutional: Negative for activity change, appetite change, chills and fever  HENT: Negative for congestion and trouble swallowing  Respiratory: Negative for cough and shortness of breath  Cardiovascular: Negative for chest pain, palpitations and leg swelling  Gastrointestinal: Negative for abdominal pain, constipation, diarrhea, nausea and vomiting  Genitourinary: Negative for difficulty urinating, dysuria, flank pain, frequency, hematuria and urgency  Musculoskeletal: Negative for back pain and gait problem  Skin: Negative for wound  Allergic/Immunologic: Negative for immunocompromised state  Neurological: Negative for dizziness and syncope  Hematological: Does not bruise/bleed easily  Psychiatric/Behavioral: Negative for confusion  All other systems reviewed and are negative  Vitals  Vitals:    05/09/22 1022   BP: 108/72   Weight: 75 3 kg (166 lb)   Height: 6' 2" (1 88 m)       Physical Exam  Constitutional:       General: He is not in acute distress  Appearance: Normal appearance  He is not ill-appearing, toxic-appearing or diaphoretic     HENT:      Head: Normocephalic  Nose: No congestion  Eyes:      General: No scleral icterus  Right eye: No discharge  Left eye: No discharge  Conjunctiva/sclera: Conjunctivae normal       Pupils: Pupils are equal, round, and reactive to light  Pulmonary:      Effort: Pulmonary effort is normal    Musculoskeletal:      Cervical back: Normal range of motion  Skin:     General: Skin is warm and dry  Coloration: Skin is not jaundiced or pale  Findings: No bruising, erythema, lesion or rash  Neurological:      General: No focal deficit present  Mental Status: He is alert and oriented to person, place, and time  Mental status is at baseline  Gait: Gait normal    Psychiatric:         Mood and Affect: Mood normal          Behavior: Behavior normal          Thought Content: Thought content normal          Judgment: Judgment normal            Past History  Past Medical History:   Diagnosis Date    [facility]     Hyperlipidemia     Hypertension     [facility]     Presence of cardiac defibrillator     Prostate disorder      Social History     Socioeconomic History    Marital status:       Spouse name: None    Number of children: None    Years of education: None    Highest education level: None   Occupational History    None   Tobacco Use    Smoking status: Never Smoker    Smokeless tobacco: Never Used   Vaping Use    Vaping Use: Never used   Substance and Sexual Activity    Alcohol use: Not Currently    Drug use: Never    Sexual activity: None   Other Topics Concern    None   Social History Narrative    None     Social Determinants of Health     Financial Resource Strain: Not on file   Food Insecurity: Not on file   Transportation Needs: Not on file   Physical Activity: Not on file   Stress: Not on file   Social Connections: Not on file   Intimate Partner Violence: Not on file   Housing Stability: Not on file     Social History     Tobacco Use   Smoking Status Never Smoker   Smokeless Tobacco Never Used     History reviewed  No pertinent family history      The following portions of the patient's history were reviewed and updated as appropriate: allergies, current medications, past medical history, past social history, past surgical history and problem list     Results  No results found for this or any previous visit (from the past 1 hour(s)) ]  Lab Results   Component Value Date    PSA 4 6 (H) 09/07/2021     Lab Results   Component Value Date    CALCIUM 8 9 05/03/2022    K 4 2 05/03/2022    CO2 28 05/03/2022     05/03/2022    BUN 14 05/03/2022    CREATININE 1 23 05/03/2022     Lab Results   Component Value Date    WBC 7 89 12/21/2021    HGB 14 2 12/21/2021    HCT 45 3 12/21/2021    MCV 89 12/21/2021     12/21/2021       Abbie Trivedi PA-C

## 2022-06-06 ENCOUNTER — RA CDI HCC (OUTPATIENT)
Dept: OTHER | Facility: HOSPITAL | Age: 82
End: 2022-06-06

## 2022-06-06 NOTE — PROGRESS NOTES
[facility]  coding opportunities       Chart reviewed, no opportunity found:   [address]        Patients Insurance     Medicare Insurance: [facility] Advantage

## 2022-06-07 ENCOUNTER — APPOINTMENT (OUTPATIENT)
Dept: LAB | Facility: HOSPITAL | Age: 82
End: 2022-06-07
Payer: COMMERCIAL

## 2022-06-07 ENCOUNTER — HOSPITAL ENCOUNTER (OUTPATIENT)
Dept: RADIOLOGY | Facility: HOSPITAL | Age: 82
Discharge: HOME | End: 2022-06-07
Payer: COMMERCIAL

## 2022-06-07 ENCOUNTER — OFFICE VISIT (OUTPATIENT)
Dept: FAMILY MEDICINE CLINIC | Facility: CLINIC | Age: 82
End: 2022-06-07
Payer: COMMERCIAL

## 2022-06-07 VITALS
BODY MASS INDEX: 21.05 KG/M2 | SYSTOLIC BLOOD PRESSURE: 102 MMHG | HEART RATE: 41 BPM | OXYGEN SATURATION: 98 % | WEIGHT: 164 LBS | HEIGHT: 74 IN | DIASTOLIC BLOOD PRESSURE: 70 MMHG

## 2022-06-07 DIAGNOSIS — R21 RASH AND NONSPECIFIC SKIN ERUPTION: ICD-10-CM

## 2022-06-07 DIAGNOSIS — R07.89 CHEST DISCOMFORT: ICD-10-CM

## 2022-06-07 DIAGNOSIS — R42 DIZZY SPELLS: Primary | ICD-10-CM

## 2022-06-07 LAB
ALBUMIN SERPL BCP-MCNC: 3.5 G/DL (ref 3.5–5)
ALP SERPL-CCNC: 52 U/L (ref 46–116)
ALT SERPL W P-5'-P-CCNC: 43 U/L (ref 12–78)
ANION GAP SERPL CALCULATED.3IONS-SCNC: 2 MMOL/L (ref 4–13)
AST SERPL W P-5'-P-CCNC: 23 U/L (ref 5–45)
BASOPHILS # BLD AUTO: 0.02 THOUSANDS/ΜL (ref 0–0.1)
BASOPHILS NFR BLD AUTO: 1 % (ref 0–1)
BILIRUB SERPL-MCNC: 1.23 MG/DL (ref 0.2–1)
BUN SERPL-MCNC: 12 MG/DL (ref 5–25)
CALCIUM SERPL-MCNC: 9.1 MG/DL (ref 8.3–10.1)
CHLORIDE SERPL-SCNC: 107 MMOL/L (ref 100–108)
CO2 SERPL-SCNC: 27 MMOL/L (ref 21–32)
CREAT SERPL-MCNC: 1.23 MG/DL (ref 0.6–1.3)
EOSINOPHIL # BLD AUTO: 0.02 THOUSAND/ΜL (ref 0–0.61)
EOSINOPHIL NFR BLD AUTO: 1 % (ref 0–6)
ERYTHROCYTE [DISTWIDTH] IN BLOOD BY AUTOMATED COUNT: 15 % (ref 11.6–15.1)
GFR SERPL CREATININE-BSD FRML MDRD: 54 ML/MIN/1.73SQ M
GLUCOSE SERPL-MCNC: 123 MG/DL (ref 65–140)
HCT VFR BLD AUTO: 42.1 % (ref 36.5–49.3)
HGB BLD-MCNC: 13.2 G/DL (ref 12–17)
IMM GRANULOCYTES # BLD AUTO: 0.01 THOUSAND/UL (ref 0–0.2)
IMM GRANULOCYTES NFR BLD AUTO: 0 % (ref 0–2)
LYMPHOCYTES # BLD AUTO: 1.69 THOUSANDS/ΜL (ref 0.6–4.47)
LYMPHOCYTES NFR BLD AUTO: 42 % (ref 14–44)
MCH RBC QN AUTO: 28.6 PG (ref 26.8–34.3)
MCHC RBC AUTO-ENTMCNC: 31.4 G/DL (ref 31.4–37.4)
MCV RBC AUTO: 91 FL (ref 82–98)
MONOCYTES # BLD AUTO: 0.34 THOUSAND/ΜL (ref 0.17–1.22)
MONOCYTES NFR BLD AUTO: 8 % (ref 4–12)
NEUTROPHILS # BLD AUTO: 1.95 THOUSANDS/ΜL (ref 1.85–7.62)
NEUTS SEG NFR BLD AUTO: 48 % (ref 43–75)
NRBC BLD AUTO-RTO: 0 /100 WBCS
PLATELET # BLD AUTO: 138 THOUSANDS/UL (ref 149–390)
PMV BLD AUTO: 12 FL (ref 8.9–12.7)
POTASSIUM SERPL-SCNC: 4.4 MMOL/L (ref 3.5–5.3)
PROT SERPL-MCNC: 8 G/DL (ref 6.4–8.2)
RBC # BLD AUTO: 4.62 MILLION/UL (ref 3.88–5.62)
SODIUM SERPL-SCNC: 136 MMOL/L (ref 136–145)
WBC # BLD AUTO: 4.03 THOUSAND/UL (ref 4.31–10.16)

## 2022-06-07 PROCEDURE — 99214 OFFICE O/P EST MOD 30 MIN: CPT | Performed by: NURSE PRACTITIONER

## 2022-06-07 PROCEDURE — 85025 COMPLETE CBC W/AUTO DIFF WBC: CPT

## 2022-06-07 PROCEDURE — 3288F FALL RISK ASSESSMENT DOCD: CPT | Performed by: NURSE PRACTITIONER

## 2022-06-07 PROCEDURE — 71046 X-RAY EXAM CHEST 2 VIEWS: CPT

## 2022-06-07 PROCEDURE — 3725F SCREEN DEPRESSION PERFORMED: CPT | Performed by: NURSE PRACTITIONER

## 2022-06-07 PROCEDURE — 1160F RVW MEDS BY RX/DR IN RCRD: CPT | Performed by: NURSE PRACTITIONER

## 2022-06-07 PROCEDURE — 93000 ELECTROCARDIOGRAM COMPLETE: CPT | Performed by: NURSE PRACTITIONER

## 2022-06-07 PROCEDURE — 1101F PT FALLS ASSESS-DOCD LE1/YR: CPT | Performed by: NURSE PRACTITIONER

## 2022-06-07 PROCEDURE — 1036F TOBACCO NON-USER: CPT | Performed by: NURSE PRACTITIONER

## 2022-06-07 PROCEDURE — 80053 COMPREHEN METABOLIC PANEL: CPT

## 2022-06-07 PROCEDURE — 36415 COLL VENOUS BLD VENIPUNCTURE: CPT

## 2022-06-07 NOTE — PROGRESS NOTES
Assessment/Plan:    No problem-specific Assessment & Plan notes found for this encounter  Diagnoses and all orders for this visit:    Dizzy spells  Comments:  Orthostatics positive, ECG completed  Discussed monitoring BP at home, adequate hydration, change positions slowly  To discuss w/ card, return in 2 weeks  Orders:  -     POCT ECG    Chest discomfort  Comments:  POCT ECG completed  Blood work/CXR ordered  Discussed monitoring for worsening symptoms, and to seek care if they develop  To discuss sx with cardiology for f/u  Orders:  -     POCT ECG  -     CBC and differential; Future  -     Comprehensive metabolic panel; Future  -     XR chest pa & lateral; Future    Rash and nonspecific skin eruption  Comments:  Advised to avoid placing pant line/belt on area  To keep area clean/dry, avoid scratching  Topical cream as prescribed  Orders:  -     hydrocortisone 2 5 % cream; Apply topically 2 (two) times a day          Subjective:      Patient ID: Lajoyce Goodpasture is a 80 y o  male  Dizziness  This is a new problem  The current episode started 1 to 4 weeks ago  The problem occurs every several days  The problem has been unchanged  Associated symptoms include chest pain (x's 2 episodes ) and fatigue  Pertinent negatives include no abdominal pain, anorexia, arthralgias, change in bowel habit, chills, congestion, coughing, diaphoresis, fever, headaches, joint swelling, myalgias, nausea, neck pain, numbness, rash, sore throat, swollen glands, urinary symptoms, vertigo, visual change, vomiting or weakness  The symptoms are aggravated by walking  He has tried nothing for the symptoms  The treatment provided no relief  Rash  This is a new problem  The current episode started in the past 7 days  The problem is unchanged  The affected locations include the abdomen  The problem is mild  The rash is characterized by dryness and itchiness  It is unknown if there was an exposure to a precipitant   The rash first occurred at home  Associated symptoms include fatigue and itching  Pertinent negatives include no anorexia, congestion, cough, decreased physical activity, decreased responsiveness, decreased sleep, drinking less, diarrhea, facial edema, fever, joint pain, rhinorrhea, shortness of breath, sore throat or vomiting  Past treatments include nothing  His past medical history is significant for varicella  There is no history of allergies, asthma or eczema  There were no sick contacts  The following portions of the patient's history were reviewed and updated as appropriate: allergies, current medications, past family history, past medical history, past social history, past surgical history and problem list     Review of Systems   Constitutional: Positive for fatigue  Negative for activity change, appetite change, chills, decreased responsiveness, diaphoresis, fever and unexpected weight change  HENT: Negative for congestion, ear pain, hearing loss, rhinorrhea, sinus pressure, sinus pain, sore throat, tinnitus and trouble swallowing  Eyes: Negative for photophobia and visual disturbance  Respiratory: Negative for cough, chest tightness, shortness of breath and wheezing  Cardiovascular: Positive for chest pain (x's 2 episodes )  Negative for palpitations and leg swelling  Gastrointestinal: Negative for abdominal pain, anorexia, change in bowel habit, diarrhea, nausea and vomiting  Genitourinary: Negative for dysuria, frequency, hematuria and urgency  Musculoskeletal: Negative for arthralgias, joint pain, joint swelling, myalgias and neck pain  Skin: Positive for itching  Negative for color change and rash  Neurological: Positive for dizziness  Negative for vertigo, seizures, facial asymmetry, speech difficulty, weakness, light-headedness, numbness and headaches  Psychiatric/Behavioral: Negative for confusion           Objective:      /70 (Patient Position: Standing)   Pulse (!) 41   Ht 6' 2" (1 88 m)   Wt 74 4 kg (164 lb)   SpO2 98%   BMI 21 06 kg/m²          Physical Exam  Vitals reviewed  Constitutional:       General: He is not in acute distress  Appearance: Normal appearance  He is not ill-appearing  Neck:      Vascular: No carotid bruit  Musculoskeletal:      Cervical back: Normal range of motion and neck supple  Lymphadenopathy:      Cervical: No cervical adenopathy  Neurological:      Mental Status: He is alert

## 2022-06-07 NOTE — PATIENT INSTRUCTIONS
Dizziness   AMBULATORY CARE:   Dizziness  is a feeling of being off balance or unsteady  Common causes of dizziness are an inner ear fluid imbalance or a lack of oxygen in your blood  Dizziness may be acute (lasts 3 days or less) or chronic (lasts longer than 3 days)  You may have dizzy spells that last from seconds to a few hours  Common symptoms that may happen with dizziness:   A feeling that your surroundings are moving even though you are standing still    Ringing in your ears or hearing loss     Feeling faint or lightheaded     Weakness or unsteadiness     Double vision or eye movements you cannot control    Nausea or vomiting     Confusion    Seek care immediately if:   You have a headache and a stiff neck  You have shaking chills and a fever  You vomit over and over with no relief  Your vomit or bowel movements are red or black  You have pain in your chest, back, or abdomen  You have numbness, especially in your face, arms, or legs  You have trouble moving your arms or legs  You are confused  Contact your healthcare provider if:   You have a fever  Your symptoms do not get better with treatment  You have questions or concerns about your condition or care  Treatment for dizziness  depends on the cause  Your healthcare provider may give you oxygen or medicines to decrease your dizziness and nausea  He may also refer you to a specialist  Giselle Mejiaa may need to be admitted to the hospital for treatment  Manage your symptoms:   Do not drive  or operate heavy machinery when you are dizzy  Get up slowly  from sitting or lying down  Drink plenty of liquids  Liquids help prevent dehydration  Ask how much liquid to drink each day and which liquids are best for you  Follow up with your doctor as directed:  Write down your questions so you remember to ask them during your visits     © Copyright [employer] 2022 Information is for End User's use only and may not be sold, redistributed or otherwise used for commercial purposes  All illustrations and images included in CareNotes® are the copyrighted property of A D A M [employer]  or Mala Figueroa  The above information is an  only  It is not intended as medical advice for individual conditions or treatments  Talk to your doctor, nurse or pharmacist before following any medical regimen to see if it is safe and effective for you

## 2022-06-27 DIAGNOSIS — I42.8 OTHER CARDIOMYOPATHY (HCC): Primary | ICD-10-CM

## 2022-06-27 NOTE — TELEPHONE ENCOUNTER
Medication Refill Request     Name sacubitril-valsartan (Entresto) 49-51 MG TABS [[id number]]  Dose/FrequencyTake 1 tablet by mouth 2 (two) times a day    Quantity ?   Verified pharmacy   [x]  Verified ordering Provider   [x]  Verified enough for 3 days  [x]

## 2022-06-28 RX ORDER — SACUBITRIL AND VALSARTAN 49; 51 MG/1; MG/1
1 TABLET, FILM COATED ORAL 2 TIMES DAILY
Qty: 180 TABLET | Refills: 3 | Status: SHIPPED | OUTPATIENT
Start: 2022-06-28

## 2022-06-30 ENCOUNTER — RA CDI HCC (OUTPATIENT)
Dept: OTHER | Facility: HOSPITAL | Age: 82
End: 2022-06-30

## 2022-06-30 NOTE — PROGRESS NOTES
[facility]  coding opportunities       Chart reviewed, no opportunity found:   [address]        Patients Insurance     Medicare Insurance: [facility]

## 2022-07-26 RX ORDER — DIGOXIN 125 MCG
125 TABLET ORAL DAILY
OUTPATIENT
Start: 2022-07-26

## 2022-07-27 ENCOUNTER — TELEPHONE (OUTPATIENT)
Dept: FAMILY MEDICINE CLINIC | Facility: CLINIC | Age: 82
End: 2022-07-27

## 2022-07-27 DIAGNOSIS — I48.0 PAROXYSMAL A-FIB (HCC): Primary | ICD-10-CM

## 2022-07-27 RX ORDER — DIGOXIN 125 MCG
125 TABLET ORAL DAILY
Qty: 90 TABLET | Refills: 3 | Status: SHIPPED | OUTPATIENT
Start: 2022-07-27

## 2022-07-27 NOTE — TELEPHONE ENCOUNTER
Called pt's number and left voicemail explaining the medication he had requested refilled, digoxin (lanoxin) 0 125 mg was not refilled by [facility] in the past and that they should contact his cardiologist and if they had any questions to give us a call back at [phone]

## 2022-07-27 NOTE — TELEPHONE ENCOUNTER
Name of Caller:  Patient   Call back Number:  [phone]  Medication(s):  digoxin (LANOXIN) 0 125 mg tablet   Are we prescribing provider?:    30 or 90 day supply: 90 days supply     Pharmacy name/number: [facility]/pharmacy #[id number]- [place] - [id number]        Last or Next appt?: 8/24/22      PT STATED HE IS OUT OF ALL HIS MEDICATION AND HAS NONE TO TAKE FOR TOMORROW

## 2022-08-01 DIAGNOSIS — Z00.00 ENCOUNTER FOR GENERAL ADULT MEDICAL EXAMINATION WITHOUT ABNORMAL FINDINGS: ICD-10-CM

## 2022-08-02 RX ORDER — MULTIVITAMIN WITH FOLIC ACID 400 MCG
TABLET ORAL
Qty: 90 TABLET | Refills: 1 | Status: SHIPPED | OUTPATIENT
Start: 2022-08-02

## 2022-08-24 ENCOUNTER — OFFICE VISIT (OUTPATIENT)
Dept: CARDIOLOGY CLINIC | Facility: CLINIC | Age: 82
End: 2022-08-24
Payer: COMMERCIAL

## 2022-08-24 VITALS
WEIGHT: 160 LBS | HEART RATE: 65 BPM | BODY MASS INDEX: 20.53 KG/M2 | HEIGHT: 74 IN | RESPIRATION RATE: 16 BRPM | OXYGEN SATURATION: 98 % | DIASTOLIC BLOOD PRESSURE: 56 MMHG | SYSTOLIC BLOOD PRESSURE: 98 MMHG

## 2022-08-24 DIAGNOSIS — I48.0 PAROXYSMAL A-FIB (HCC): ICD-10-CM

## 2022-08-24 DIAGNOSIS — I25.10 ATHEROSCLEROSIS OF NATIVE CORONARY ARTERY OF NATIVE HEART WITHOUT ANGINA PECTORIS: Primary | ICD-10-CM

## 2022-08-24 DIAGNOSIS — Z95.810 AICD (AUTOMATIC CARDIOVERTER/DEFIBRILLATOR) PRESENT: ICD-10-CM

## 2022-08-24 PROCEDURE — 99214 OFFICE O/P EST MOD 30 MIN: CPT | Performed by: INTERNAL MEDICINE

## 2022-08-24 PROCEDURE — 1160F RVW MEDS BY RX/DR IN RCRD: CPT | Performed by: INTERNAL MEDICINE

## 2022-08-24 NOTE — PROGRESS NOTES
Cardiology Consultation     Ruth Sanderson  [id number]  1940  [facility] Kiran Nahum [address] 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78  He was hospitalized in [place] in 2013 after he passed out and he had 1 stent and an AICD inserted  He has been on multiple medications including Farxiga, Entresto, digoxin, Lipitor, amiodarone, metoprolol, and he is on Eliquis for paroxysmal atrial fibrillation  AICD has shown no high heart rate episodes  He has not had an electric shock other than a few years ago  He can walk up to 20 blocks and does not get exertional chest discomfort or shortness of breath  ECHO has shown a normal ejection fraction of 55% and mild-to-moderate mitral regurgitation        1  Atherosclerosis of native coronary artery of native heart without angina pectoris    2  AICD (automatic cardioverter/defibrillator) present      Patient Active Problem List   Diagnosis    Protein-calorie malnutrition, unspecified severity ([facility] )    Atherosclerosis of native coronary artery of native heart without angina pectoris    Paroxysmal A-fib ([facility] )    Mixed hyperlipidemia    AICD (automatic cardioverter/defibrillator) present    Systolic heart failure ([facility] )     Past Medical History:   Diagnosis Date    CH[facility]congestive heart failure) ([facility]     Hyperlipidemia     Hypertension     MI (myocardial infarction) ([facility] )     Presence of cardiac defibrillator     Prostate disorder      Social History     Socioeconomic History    Marital status:       Spouse name: Not on file    Number of children: Not on file    Years of education: Not on file    Highest education level: Not on file   Occupational History    Not on file   Tobacco Use    Smoking status: Never Smoker    Smokeless tobacco: Never Used   Vaping Use    Vaping Use: Never used   Substance and Sexual Activity    Alcohol use: Not Currently    Drug use: Never    Sexual activity: Not on file   Other Topics Concern    Not on file   Social History Narrative    Not on file     Social Determinants of Health     Financial Resource Strain: Not on file   Food Insecurity: Not on file   Transportation Needs: Not on file   Physical Activity: Not on file   Stress: Not on file   Social Connections: Not on file   Intimate Partner Violence: Not on file   Housing Stability: Not on file      History reviewed  No pertinent family history    Past Surgical History:   Procedure Laterality Date    CORONARY ANGIOPLASTY WITH STENT PLACEMENT         Current Outpatient Medications:     amiodarone 200 mg tablet, Take 1 tablet (200 mg total) by mouth daily, Disp: 90 tablet, Rfl: 3    apixaban (ELIQUIS) 5 mg, Take 5 mg by mouth 2 (two) times a day, Disp: , Rfl:     aspirin (ECOTRIN LOW STRENGTH) 81 mg EC tablet, Take 81 mg by mouth daily, Disp: , Rfl:     atorvastatin (LIPITOR) 20 mg tablet, Take 1 tablet (20 mg total) by mouth daily (Patient taking differently: Take 20 mg by mouth daily at bedtime), Disp: 90 tablet, Rfl: 3    Dapagliflozin Propanediol (Farxiga) 10 MG TABS, Take 1 tablet (10 mg total) by mouth daily, Disp: 90 tablet, Rfl: 3    digoxin (LANOXIN) 0 125 mg tablet, Take 1 tablet (125 mcg total) by mouth daily, Disp: 90 tablet, Rfl: 3    finasteride (PROSCAR) 5 mg tablet, Take 1 tablet (5 mg total) by mouth daily, Disp: 90 tablet, Rfl: 3    metoprolol succinate (TOPROL-XL) 25 mg 24 hr tablet, Take 1 tablet (25 mg total) by mouth daily, Disp: 90 tablet, Rfl: 3    Multiple Vitamin (Daily-Misael Multivitamin) TABS, TAKE 1 TABLET BY MOUTH EVERY DAY, Disp: 90 tablet, Rfl: 1    omeprazole (PriLOSEC) 40 MG capsule, Take 40 mg by mouth as needed, Disp: , Rfl:     sacubitril-valsartan (Entresto) 49-51 MG TABS, Take 1 tablet by mouth 2 (two) times a day, Disp: 180 tablet, Rfl: 3    tamsulosin (FLOMAX) 0 4 mg, Take 1 capsule (0 4 mg total) by mouth daily with dinner, Disp: 90 capsule, Rfl: 3    hydrocortisone 2 5 % cream, Apply topically 2 (two) times a day (Patient not taking: No sig reported), Disp: 30 g, Rfl: 0  No Known Allergies  Vitals:    08/24/22 1036   BP: 98/56   BP Location: Left arm   Patient Position: Sitting   Cuff Size: Standard   Pulse: 65   Resp: 16   SpO2: 98%   Weight: 72 6 kg (160 lb)   Height: 6' 2" (1 88 m)       Labs:  No visits with results within 2 Month(s) from this visit  Latest known visit with results is:   Appointment on 06/07/2022   Component Date Value    WBC 06/07/2022 4 03 (A)    RBC 06/07/2022 4 62     Hemoglobin 06/07/2022 13 2     Hematocrit 06/07/2022 42 1     MCV 06/07/2022 91     MCH 06/07/2022 28 6     MCHC 06/07/2022 31 4     RDW 06/07/2022 15 0     MPV 06/07/2022 12 0     Platelets 44/06/7923 138 (A)    nRBC 06/07/2022 0     Neutrophils Relative 06/07/2022 48     Immat GRANS % 06/07/2022 0     Lymphocytes Relative 06/07/2022 42     Monocytes Relative 06/07/2022 8     Eosinophils Relative 06/07/2022 1     Basophils Relative 06/07/2022 1     Neutrophils Absolute 06/07/2022 1 95     Immature Grans Absolute 06/07/2022 0 01     Lymphocytes Absolute 06/07/2022 1 69     Monocytes Absolute 06/07/2022 0 34     Eosinophils Absolute 06/07/2022 0 02     Basophils Absolute 06/07/2022 0 02     Sodium 06/07/2022 136     Potassium 06/07/2022 4 4     Chloride 06/07/2022 107     CO2 06/07/2022 27     ANION GAP 06/07/2022 2 (A)    BUN 06/07/2022 12     Creatinine 06/07/2022 1 23     Glucose 06/07/2022 123     Calcium 06/07/2022 9 1     AST 06/07/2022 23     ALT 06/07/2022 43     Alkaline Phosphatase 06/07/2022 52     Total Protein 06/07/2022 8 0     Albumin 06/07/2022 3 5     Total Bilirubin 06/07/2022 1 23 (A)    eGFR 06/07/2022 54      Imaging: No results found      Review of Systems:  Review of Systems    Physical Exam:  Well-developed well-nourished  98/56  Heart rate 82 and regular  Skin warm dry  Pupils equal   Carotids 2+ without bruits  Lungs clear  Rhythm regular with occasional extrasystoles  Grade 2 short systolic murmur at the apex  No liver enlargement  No edema  Good strength in extremities    EKG shows possible anteroseptal scar     Discussion/Summary:    1  Coronary artery disease without angina pectoris  2  Presence of AICD but with a normal ejection fraction on cardiac meds  3  Paroxysmal atrial fibrillation    Recommendations:    1  Continue current medications  2  Obtain records from [facility] to find out more information  3   Return 6 months               Layla Busch MD

## 2022-09-14 ENCOUNTER — OFFICE VISIT (OUTPATIENT)
Dept: FAMILY MEDICINE CLINIC | Facility: CLINIC | Age: 82
End: 2022-09-14
Payer: COMMERCIAL

## 2022-09-14 VITALS
WEIGHT: 164 LBS | TEMPERATURE: 97.9 F | SYSTOLIC BLOOD PRESSURE: 112 MMHG | OXYGEN SATURATION: 100 % | HEIGHT: 74 IN | DIASTOLIC BLOOD PRESSURE: 70 MMHG | HEART RATE: 61 BPM | BODY MASS INDEX: 21.05 KG/M2

## 2022-09-14 DIAGNOSIS — I10 HYPERTENSION, UNSPECIFIED TYPE: ICD-10-CM

## 2022-09-14 DIAGNOSIS — K21.9 GASTROESOPHAGEAL REFLUX DISEASE, UNSPECIFIED WHETHER ESOPHAGITIS PRESENT: ICD-10-CM

## 2022-09-14 DIAGNOSIS — I50.20 SYSTOLIC CONGESTIVE HEART FAILURE, UNSPECIFIED HF CHRONICITY (HCC): ICD-10-CM

## 2022-09-14 DIAGNOSIS — E78.2 MIXED HYPERLIPIDEMIA: ICD-10-CM

## 2022-09-14 DIAGNOSIS — R11.0 NAUSEA: Primary | ICD-10-CM

## 2022-09-14 PROCEDURE — 1160F RVW MEDS BY RX/DR IN RCRD: CPT | Performed by: NURSE PRACTITIONER

## 2022-09-14 PROCEDURE — 99213 OFFICE O/P EST LOW 20 MIN: CPT | Performed by: NURSE PRACTITIONER

## 2022-09-14 RX ORDER — ONDANSETRON 4 MG/1
4 TABLET, FILM COATED ORAL EVERY 8 HOURS PRN
Qty: 20 TABLET | Refills: 0 | Status: SHIPPED | OUTPATIENT
Start: 2022-09-14

## 2022-09-14 RX ORDER — OMEPRAZOLE 40 MG/1
40 CAPSULE, DELAYED RELEASE ORAL AS NEEDED
Qty: 90 CAPSULE | Refills: 0 | Status: SHIPPED | OUTPATIENT
Start: 2022-09-14 | End: 2022-10-11

## 2022-09-14 NOTE — PATIENT INSTRUCTIONS
Acute Nausea and Vomiting   AMBULATORY CARE:   Acute nausea and vomiting  starts suddenly, gets worse quickly, and lasts a short time  Common causes include pregnancy, alcohol, infection, and medicines  A head injury, heart attack, or inner ear imbalance can also cause acute nausea and vomiting  Seek care immediately if:   You see blood in your vomit or your bowel movements  You have sudden, severe pain in your chest and upper abdomen after hard vomiting or retching  You have swelling in your neck and chest      You are dizzy, cold, and thirsty and your eyes and mouth are dry  You are urinating very little or not at all  You have muscle weakness, leg cramps, and trouble breathing  Your heart is beating much faster than normal      You continue to vomit for more than 48 hours  Contact your healthcare provider if:   You have frequent dry heaves (vomiting but nothing comes out)  Your nausea and vomiting does not get better or go away after you use medicine  You have questions or concerns about your condition or treatment  Treatment for acute nausea and vomiting  may include medicines to calm your stomach and stop the vomiting  You may need IV fluids if you are dehydrated  Prevent or manage acute nausea and vomiting:   Do not drink alcohol  Alcohol may upset or irritate your stomach  Too much alcohol can also cause acute nausea and vomiting  Control stress  Headaches due to stress may cause nausea and vomiting  Find ways to relax and manage your stress  Get more rest and sleep  Drink more liquids as directed  Vomiting can lead to dehydration  It is important to drink more liquids to help replace lost body fluids  Ask your healthcare provider how much liquid to drink each day and which liquids are best for you  Your provider may recommend that you drink an oral rehydration solution (ORS)  ORS contains water, salts, and sugar that are needed to replace the lost body fluids   Ask what kind of ORS to use, how much to drink, and where to get it  Eat smaller meals, more often  Eat small amounts of food every 2 to 3 hours, even if you are not hungry  Food in your stomach may decrease your nausea  Talk to your healthcare provider before you take over-the-counter (OTC) medicines  These medicines can cause serious problems if you use certain other medicines, or you have a medical condition  You may have problems if you use too much or use them for longer than the label says  Follow directions on the label carefully  Follow up with your healthcare provider as directed:  Write down your questions so you remember to ask them during your visits  © Copyright [employer] 2022 Information is for End User's use only and may not be sold, redistributed or otherwise used for commercial purposes  All illustrations and images included in CareNotes® are the copyrighted property of [employer]  or Mala White   The above information is an  only  It is not intended as medical advice for individual conditions or treatments  Talk to your doctor, nurse or pharmacist before following any medical regimen to see if it is safe and effective for you

## 2022-09-14 NOTE — PROGRESS NOTES
Name: Jose De Jesus Sarah      : 1940      MRN: [id number]  Encounter Provider: OSKAR Jason  Encounter Date: 2022   Encounter department: [facility] [address] [place] [address]  Nausea  Comments:  Discussed causes of nausea including medications  Advised to increase hydration, foods high in protein, avoid sugary drinks, caffeine, acidic food  Zofran prn  Orders:  -     ondansetron (ZOFRAN) 4 mg tablet; Take 1 tablet (4 mg total) by mouth every 8 (eight) hours as needed for nausea or vomiting    2  Gastroesophageal reflux disease, unspecified whether esophagitis present  Assessment & Plan:  Advised to re-start his omeprazole on a prn basis as he did previously to aid with acid reflux symptoms/nausea  To avoid heavy meals then laying down  Limit caffeine intake, spicy/acidic foods  Orders:  -     omeprazole (PriLOSEC) 40 MG capsule; Take 1 capsule (40 mg total) by mouth as needed (acid reflux symptoms)    3  Systolic congestive heart failure, unspecified HF chronicity (HCC)  Assessment & Plan:  Wt Readings from Last 3 Encounters:   22 74 4 kg (164 lb)   22 72 6 kg (160 lb)   22 74 4 kg (164 lb)     Denies abnormal weight gain, peripheral edema, SOB  Recently saw cardiology last month, to see in another 5-6 months  Continue routine follow-up with cardiology as scheduled  4  Mixed hyperlipidemia  -     Lipid panel; Future    5  Hypertension, unspecified type  -     Hemoglobin A1C; Future  -     CBC and Platelet; Future  -     Comprehensive metabolic panel; Future         Subjective      Patient presents to the office for evaluation of nausea x's 1 week  Has been present daily since start  Notes nausea is worse at night  Usually subsides on its own  Denies severe abdominal pain, vomiting, diarrhea, fever, chills  Denies difficulty eating, decreased PO intake or decreased urine output    Denies addition of new medications, recent illness, or sick contacts  Patient states he was advised to only use his omeprazole as needed, but stopped taking omeprazole all together  Notes intermittent symptoms related to acid reflux, but has been taking OTC Pepto-Bismol to relieve his symptoms  Review of Systems   Constitutional: Negative for activity change, chills and fever  HENT: Negative for sore throat and trouble swallowing  Eyes: Negative for photophobia and visual disturbance  Respiratory: Negative for cough and shortness of breath  Cardiovascular: Negative for chest pain and palpitations  Gastrointestinal: Positive for nausea  Negative for abdominal distention, abdominal pain, anal bleeding, blood in stool, constipation, diarrhea and vomiting  Genitourinary: Negative for decreased urine volume  Musculoskeletal: Negative for arthralgias and myalgias  Skin: Negative for color change and rash  Neurological: Negative for dizziness, syncope, weakness, light-headedness, numbness and headaches  Psychiatric/Behavioral: Negative for confusion         Current Outpatient Medications on File Prior to Visit   Medication Sig    amiodarone 200 mg tablet Take 1 tablet (200 mg total) by mouth daily    apixaban (ELIQUIS) 5 mg Take 5 mg by mouth 2 (two) times a day    aspirin (ECOTRIN LOW STRENGTH) 81 mg EC tablet Take 81 mg by mouth daily    atorvastatin (LIPITOR) 20 mg tablet Take 1 tablet (20 mg total) by mouth daily (Patient taking differently: Take 20 mg by mouth daily at bedtime)    Dapagliflozin Propanediol (Farxiga) 10 MG TABS Take 1 tablet (10 mg total) by mouth daily    digoxin (LANOXIN) 0 125 mg tablet Take 1 tablet (125 mcg total) by mouth daily    finasteride (PROSCAR) 5 mg tablet Take 1 tablet (5 mg total) by mouth daily    hydrocortisone 2 5 % cream Apply topically 2 (two) times a day    Multiple Vitamin (Daily-Misael Multivitamin) TABS TAKE 1 TABLET BY MOUTH EVERY DAY    sacubitril-valsartan (Entresto) 49-51 MG TABS Take 1 tablet by mouth 2 (two) times a day    tamsulosin (FLOMAX) 0 4 mg Take 1 capsule (0 4 mg total) by mouth daily with dinner    [DISCONTINUED] metoprolol succinate (TOPROL-XL) 25 mg 24 hr tablet Take 1 tablet (25 mg total) by mouth daily    [DISCONTINUED] omeprazole (PriLOSEC) 40 MG capsule Take 40 mg by mouth as needed       Objective     /70   Pulse 61   Temp 97 9 °F (36 6 °C)   Ht 6' 2" (1 88 m)   Wt 74 4 kg (164 lb)   SpO2 100%   BMI 21 06 kg/m²     Physical Exam  Vitals reviewed  Constitutional:       General: He is not in acute distress  Appearance: Normal appearance  He is not ill-appearing  HENT:      Head: Normocephalic and atraumatic  Right Ear: External ear normal       Left Ear: External ear normal       Nose: Nose normal       Mouth/Throat:      Mouth: Mucous membranes are moist       Pharynx: Oropharynx is clear  No oropharyngeal exudate or posterior oropharyngeal erythema  Eyes:      Conjunctiva/sclera: Conjunctivae normal       Pupils: Pupils are equal, round, and reactive to light  Cardiovascular:      Rate and Rhythm: Normal rate  Heart sounds: Murmur heard  Pulmonary:      Effort: Pulmonary effort is normal       Breath sounds: Normal breath sounds  Abdominal:      General: Bowel sounds are normal  There is no distension  Palpations: Abdomen is soft  There is no mass  Tenderness: There is no abdominal tenderness  There is no right CVA tenderness or left CVA tenderness  Musculoskeletal:         General: Normal range of motion  Cervical back: Normal range of motion and neck supple  Right lower leg: No edema  Left lower leg: No edema  Lymphadenopathy:      Cervical: No cervical adenopathy  Skin:     General: Skin is warm and dry  Capillary Refill: Capillary refill takes less than 2 seconds  Neurological:      General: No focal deficit present        Mental Status: He is alert and oriented to person, place, and time     Psychiatric:         Mood and Affect: Mood normal          Behavior: Behavior normal        OSKAR Rosales

## 2022-09-14 NOTE — ASSESSMENT & PLAN NOTE
Wt Readings from Last 3 Encounters:   09/14/22 74 4 kg (164 lb)   08/24/22 72 6 kg (160 lb)   06/07/22 74 4 kg (164 lb)     Denies abnormal weight gain, peripheral edema, SOB  Recently saw cardiology last month, to see in another 5-6 months  Continue routine follow-up with cardiology as scheduled

## 2022-09-14 NOTE — ASSESSMENT & PLAN NOTE
Advised to re-start his omeprazole on a prn basis as he did previously to aid with acid reflux symptoms/nausea  To avoid heavy meals then laying down  Limit caffeine intake, spicy/acidic foods

## 2022-10-11 DIAGNOSIS — I48.0 PAROXYSMAL A-FIB (HCC): ICD-10-CM

## 2022-10-11 DIAGNOSIS — R21 RASH AND NONSPECIFIC SKIN ERUPTION: ICD-10-CM

## 2022-10-11 DIAGNOSIS — K21.9 GASTROESOPHAGEAL REFLUX DISEASE, UNSPECIFIED WHETHER ESOPHAGITIS PRESENT: ICD-10-CM

## 2022-10-11 DIAGNOSIS — I25.10 ATHEROSCLEROSIS OF NATIVE CORONARY ARTERY OF NATIVE HEART WITHOUT ANGINA PECTORIS: ICD-10-CM

## 2022-10-11 DIAGNOSIS — Z95.810 AICD (AUTOMATIC CARDIOVERTER/DEFIBRILLATOR) PRESENT: ICD-10-CM

## 2022-10-11 RX ORDER — AMIODARONE HYDROCHLORIDE 200 MG/1
TABLET ORAL
Qty: 90 TABLET | Refills: 3 | Status: SHIPPED | OUTPATIENT
Start: 2022-10-11

## 2022-10-11 RX ORDER — OMEPRAZOLE 40 MG/1
CAPSULE, DELAYED RELEASE ORAL
Qty: 90 CAPSULE | Refills: 0 | Status: SHIPPED | OUTPATIENT
Start: 2022-10-11

## 2022-10-11 NOTE — TELEPHONE ENCOUNTER
Patient called in for a refill of his Metoprolol 25 mg   I see it was discontinued on 9/14/2022  I do not see why in th eoffice note  Please advise

## 2022-10-13 ENCOUNTER — TELEPHONE (OUTPATIENT)
Dept: FAMILY MEDICINE CLINIC | Facility: CLINIC | Age: 82
End: 2022-10-13

## 2022-10-13 NOTE — TELEPHONE ENCOUNTER
T/c from pt -     Pt reports he finished metoprolol on Tue 10/11/2022 and he is out of meds  Pt was not aware of not to be taking it as discussed at the last ov on 09/14/2022     Re : metoprolol succinate (TOPROL-XL) 25 mg 24 hr tablet - refill requested     Pt is asking for an advisements

## 2022-10-14 NOTE — TELEPHONE ENCOUNTER
Can the patient be called and confirmed that he is still on the metoprolol? If so, can you send this to me via prescription refill request?  I do not know if it was his cardiologist that may have stopped the medication    Thank you

## 2022-10-26 ENCOUNTER — OFFICE VISIT (OUTPATIENT)
Dept: FAMILY MEDICINE CLINIC | Facility: CLINIC | Age: 82
End: 2022-10-26
Payer: COMMERCIAL

## 2022-10-26 ENCOUNTER — APPOINTMENT (OUTPATIENT)
Dept: LAB | Facility: HOSPITAL | Age: 82
End: 2022-10-26

## 2022-10-26 VITALS
HEIGHT: 74 IN | SYSTOLIC BLOOD PRESSURE: 116 MMHG | TEMPERATURE: 97.6 F | WEIGHT: 159.2 LBS | HEART RATE: 66 BPM | DIASTOLIC BLOOD PRESSURE: 68 MMHG | BODY MASS INDEX: 20.43 KG/M2 | OXYGEN SATURATION: 97 %

## 2022-10-26 DIAGNOSIS — I10 HYPERTENSION, UNSPECIFIED TYPE: ICD-10-CM

## 2022-10-26 DIAGNOSIS — I49.3 FREQUENT PVCS: ICD-10-CM

## 2022-10-26 DIAGNOSIS — R07.9 CHEST PAIN, UNSPECIFIED TYPE: ICD-10-CM

## 2022-10-26 DIAGNOSIS — R51.0 ORTHOSTATIC HEADACHE: Primary | ICD-10-CM

## 2022-10-26 DIAGNOSIS — R79.89 ABNORMAL CBC: ICD-10-CM

## 2022-10-26 DIAGNOSIS — E78.2 MIXED HYPERLIPIDEMIA: ICD-10-CM

## 2022-10-26 LAB
ALBUMIN SERPL BCP-MCNC: 3.7 G/DL (ref 3.5–5)
ALP SERPL-CCNC: 54 U/L (ref 46–116)
ALT SERPL W P-5'-P-CCNC: 31 U/L (ref 12–78)
ANION GAP SERPL CALCULATED.3IONS-SCNC: 11 MMOL/L (ref 4–13)
AST SERPL W P-5'-P-CCNC: 30 U/L (ref 5–45)
BILIRUB SERPL-MCNC: 1.32 MG/DL (ref 0.2–1)
BUN SERPL-MCNC: 12 MG/DL (ref 5–25)
CALCIUM SERPL-MCNC: 9 MG/DL (ref 8.3–10.1)
CHLORIDE SERPL-SCNC: 102 MMOL/L (ref 96–108)
CHOLEST SERPL-MCNC: 132 MG/DL
CO2 SERPL-SCNC: 26 MMOL/L (ref 21–32)
CREAT SERPL-MCNC: 1.26 MG/DL (ref 0.6–1.3)
ERYTHROCYTE [DISTWIDTH] IN BLOOD BY AUTOMATED COUNT: 15.3 % (ref 11.6–15.1)
EST. AVERAGE GLUCOSE BLD GHB EST-MCNC: 97 MG/DL
GFR SERPL CREATININE-BSD FRML MDRD: 52 ML/MIN/1.73SQ M
GLUCOSE P FAST SERPL-MCNC: 108 MG/DL (ref 65–99)
HBA1C MFR BLD: 5 %
HCT VFR BLD AUTO: 43 % (ref 36.5–49.3)
HDLC SERPL-MCNC: 67 MG/DL
HGB BLD-MCNC: 13.7 G/DL (ref 12–17)
LDLC SERPL CALC-MCNC: 50 MG/DL (ref 0–100)
MAGNESIUM SERPL-MCNC: 2.2 MG/DL (ref 1.6–2.6)
MCH RBC QN AUTO: 28.5 PG (ref 26.8–34.3)
MCHC RBC AUTO-ENTMCNC: 31.9 G/DL (ref 31.4–37.4)
MCV RBC AUTO: 90 FL (ref 82–98)
NONHDLC SERPL-MCNC: 65 MG/DL
PLATELET # BLD AUTO: 126 THOUSANDS/UL (ref 149–390)
PMV BLD AUTO: 12.1 FL (ref 8.9–12.7)
POTASSIUM SERPL-SCNC: 4.7 MMOL/L (ref 3.5–5.3)
PROT SERPL-MCNC: 8.4 G/DL (ref 6.4–8.4)
RBC # BLD AUTO: 4.8 MILLION/UL (ref 3.88–5.62)
SODIUM SERPL-SCNC: 139 MMOL/L (ref 135–147)
TRIGL SERPL-MCNC: 75 MG/DL
WBC # BLD AUTO: 3.64 THOUSAND/UL (ref 4.31–10.16)

## 2022-10-26 PROCEDURE — 99214 OFFICE O/P EST MOD 30 MIN: CPT | Performed by: NURSE PRACTITIONER

## 2022-10-26 PROCEDURE — 93000 ELECTROCARDIOGRAM COMPLETE: CPT | Performed by: NURSE PRACTITIONER

## 2022-10-26 NOTE — PATIENT INSTRUCTIONS
Acute Headache   AMBULATORY CARE:   An acute headache  is pain or discomfort that may start suddenly and get worse quickly  You may have an acute headache only when you feel stress or eat certain foods  Other acute headache pain can happen every day, and sometimes several times a day  The cause of an acute headache may not be known  It may be triggered by stress, fatigue, hormones, food, or trauma  Common types of acute headache:   Tension headache  is the most common type of headache  These headaches typically occur in the late afternoon and go away by evening  The pain is usually mild or moderate  You may have problems tolerating bright light or loud noise  The pain is usually across the forehead or in the back of the head, often only on one side  These headaches may occur every day  Migraine headaches  cause moderate or severe pain  The headache generally lasts from 1 to 3 days and tends to come back  Pain is usually on only one side, but it may change sides  Migraines often occur in the temple, the back of the head, or behind the eye  The pain may throb or be sharp and steady  A migraine with aura  means you see or feel something before a migraine  You may see a small spot surrounded by bright zigzag lines  Other signs or symptoms may follow the aura  Cluster headache  pain is usually only on one side  It often causes severe pain, and can last for 30 minutes to 2 hours  These headaches may occur 1 or 2 times each day, more often at night  The pain may wake you  Seek care immediately if:   You have severe pain  You have numbness or weakness on one side of your face or body  You have a headache that occurs after a blow to the head, a fall, or other trauma  You have a headache, are forgetful or confused, or have trouble speaking  You have a headache, stiff neck, and a fever  Call your doctor if:   You have a constant headache and are vomiting      You have a headache each day that does not get better, even after treatment  You have changes in your headaches, or new symptoms that occur when you have a headache  You have questions or concerns about your condition or care  Treatment:   Medicine  may be given to decrease pain  The medicine your healthcare provider recommends will depend on the kind of headaches you have  You will need to take prescription headache medicines as directed to prevent a problem called rebound headache  These headaches happen with regular use of pain relievers for headache disorders  NSAIDs or acetaminophen may help some kinds of headaches  Biofeedback  may help you learn how to change stress reactions  For example, you learn to slow your heart rate when you become upset  You may also learn to prevent certain headaches by combining heat with relaxation  Cognitive behavior therapy,  or stress management, may be used with other therapies to prevent headaches  Manage your symptoms:   Apply heat or ice  on the headache area  Use a heat or ice pack  For an ice pack, you can also put crushed ice in a plastic bag  Cover the pack or bag with a towel before you apply it to your skin  Ice and heat both help decrease pain, and heat helps decrease muscle spasms  Apply heat for 20 to 30 minutes every 2 hours  Apply ice for 15 to 20 minutes every hour  Apply heat or ice for as long and for as many days as directed  You may alternate heat and ice  Relax your muscles  Lie down in a comfortable position and close your eyes  Relax your muscles slowly  Start at your toes and work your way up your body  Keep a record of your headaches  Write down when your headaches start and stop  Include your symptoms and what you were doing when the headache began  Record what you ate or drank for 24 hours before the headache started  Describe the pain and where it hurts  Keep track of what you did to treat your headache and if it worked      Prevent an acute headache:   Avoid anything that triggers an acute headache  Examples include exposure to chemicals, going to high altitude, or not getting enough sleep  Create a regular sleep routine  Go to sleep at the same time and wake up at the same time each day  Do not use electronic devices before bedtime  These may trigger a headache or prevent you from sleeping well  Do not smoke  Nicotine and other chemicals in cigarettes and cigars can trigger an acute headache or make it worse  Ask your healthcare provider for information if you currently smoke and need help to quit  E-cigarettes or smokeless tobacco still contain nicotine  Talk to your healthcare provider before you use these products  Limit alcohol as directed  Alcohol can trigger an acute headache or make it worse  If you have cluster headaches, do not drink alcohol during an episode  For other types of headaches, ask your healthcare provider if it is safe for you to drink alcohol  Ask how much is safe for you to drink, and how often  Exercise as directed  Exercise can reduce tension and help with headache pain  Aim for 30 minutes of physical activity on most days of the week  Your healthcare provider can help you create an exercise plan  Eat a variety of healthy foods  Healthy foods include fruits, vegetables, low-fat dairy products, lean meats, fish, whole grains, and cooked beans  Your healthcare provider or dietitian can help you create meals plans if you need to avoid foods that trigger headaches  Follow up with your healthcare provider as directed:  Bring your headache record with you when you see your healthcare provider  Write down your questions so you remember to ask them during your visits  © Copyright [employer] 2022 Information is for End User's use only and may not be sold, redistributed or otherwise used for commercial purposes   All illustrations and images included in CareNotes® are the copyrighted property of A D A M [employer] Inc  or [employer]  The above information is an  only  It is not intended as medical advice for individual conditions or treatments  Talk to your doctor, nurse or pharmacist before following any medical regimen to see if it is safe and effective for you

## 2022-10-26 NOTE — PROGRESS NOTES
Name: Yvonne Melendez      : 1940      MRN: [id number]  Encounter Provider: OSKAR Jensen  Encounter Date: 10/26/2022   Encounter department: [facility] [address]     1  Orthostatic headache  Comments:  Orthostatics negative  Advised increased hydration, avoiding headache triggers like caffeine and msg, to continue to monitor BPs, Tylenol prn for pain  Orders:  -     VAS carotid complete study; Future; Expected date: 10/26/2022    2  Chest pain, unspecified type  Comments:  Pain reproducible with palpation, ECG done  Advised to follow-up with cardiology due to changes in ECG  To seek immediate care for worsening CP  Orders:  -     POCT ECG  -     Magnesium; Future    3  Abnormal CBC  Comments:  Notes decrease in WBC and platelet counts  Patient denies any symptoms  Will refer to Hematology  Orders:  -     Ambulatory Referral to Hematology / Oncology; Future    4  Frequent PVCs  Comments:  New finding on ECG since ECG in   Magnesium added to blood work, made patient aware to follow-up with cardiology  Orders:  -     Magnesium; Future           Subjective      Patient presents to the office for evaluation of "burning" in chest and headache x's 2 weeks  Patient states when he gets the urge to void will get this burning sensation in his chest, that it is relieved after voiding  Patient states these episodes are happening intermittently  As per patient, he has been checking his blood pressure at home and notes blood pressures of 886-286 systolic  Patient notes pain is located around his AICD  Notes tenderness to area  Patient denies radiation of pain, diaphoresis, shortness of breath, palpitations, syncope  Patient describes headaches as "sharp" pain  Notes pain to bilateral frontal and temporal region  States pain occurs when he lays down, but will wake him up from sleep    As per patient, episodes last 20 minutes, then self-resolves  Denies visual disturbance, dizziness, syncope, unilateral weakness, nausea, photophobia  Review of Systems   Constitutional: Negative for fatigue  HENT: Negative for congestion, ear pain, sore throat and trouble swallowing  Eyes: Negative for photophobia, pain and visual disturbance  Respiratory: Negative for cough, chest tightness and shortness of breath  Cardiovascular: Positive for chest pain  Negative for palpitations and leg swelling  Gastrointestinal: Negative for nausea and vomiting  Genitourinary: Negative for decreased urine volume, dysuria, flank pain, frequency and urgency  Musculoskeletal: Negative for arthralgias and myalgias  Skin: Negative for color change and rash  Neurological: Positive for headaches  Negative for dizziness, seizures, speech difficulty, weakness, light-headedness and numbness         Current Outpatient Medications on File Prior to Visit   Medication Sig   • amiodarone 200 mg tablet TAKE 1 TABLET BY MOUTH EVERY DAY   • apixaban (ELIQUIS) 5 mg Take 5 mg by mouth 2 (two) times a day   • aspirin (ECOTRIN LOW STRENGTH) 81 mg EC tablet Take 81 mg by mouth daily   • atorvastatin (LIPITOR) 20 mg tablet Take 1 tablet (20 mg total) by mouth daily (Patient taking differently: Take 20 mg by mouth daily at bedtime)   • Dapagliflozin Propanediol (Farxiga) 10 MG TABS Take 1 tablet (10 mg total) by mouth daily   • digoxin (LANOXIN) 0 125 mg tablet Take 1 tablet (125 mcg total) by mouth daily   • finasteride (PROSCAR) 5 mg tablet Take 1 tablet (5 mg total) by mouth daily   • hydrocortisone 2 5 % cream APPLY TO AFFECTED AREA TWICE A DAY   • Multiple Vitamin (Daily-Misael Multivitamin) TABS TAKE 1 TABLET BY MOUTH EVERY DAY   • omeprazole (PriLOSEC) 40 MG capsule TAKE 1 CAPSULE BY MOUTH AS NEEDED (ACID REFLUX SYMPTOMS)   • ondansetron (ZOFRAN) 4 mg tablet Take 1 tablet (4 mg total) by mouth every 8 (eight) hours as needed for nausea or vomiting   • sacubitril-valsartan (Entresto) 49-51 MG TABS Take 1 tablet by mouth 2 (two) times a day   • tamsulosin (FLOMAX) 0 4 mg Take 1 capsule (0 4 mg total) by mouth daily with dinner       Objective     /68   Pulse 66   Temp 97 6 °F (36 4 °C)   Ht 6' 2" (1 88 m)   Wt 72 2 kg (159 lb 3 2 oz)   SpO2 97%   BMI 20 44 kg/m²     Physical Exam  Vitals reviewed  Constitutional:       General: He is not in acute distress  Appearance: Normal appearance  He is not ill-appearing  HENT:      Head: Normocephalic and atraumatic  Right Ear: Tympanic membrane, ear canal and external ear normal       Left Ear: Tympanic membrane, ear canal and external ear normal       Nose: Nose normal       Mouth/Throat:      Mouth: Mucous membranes are moist       Pharynx: Oropharynx is clear  Eyes:      General: No visual field deficit  Extraocular Movements: Extraocular movements intact  Conjunctiva/sclera: Conjunctivae normal       Pupils: Pupils are equal, round, and reactive to light  Neck:      Vascular: No carotid bruit  Cardiovascular:      Rate and Rhythm: Normal rate and regular rhythm  Pulses: Normal pulses  Heart sounds: Normal heart sounds  No murmur heard  Pulmonary:      Effort: Pulmonary effort is normal       Breath sounds: Normal breath sounds  No wheezing  Chest:      Chest wall: Tenderness (noted to left upper ACW at site of AICD) present  No mass  Abdominal:      General: Bowel sounds are normal       Palpations: Abdomen is soft  Tenderness: There is no abdominal tenderness  There is no right CVA tenderness or left CVA tenderness  Musculoskeletal:         General: Normal range of motion  Cervical back: Normal range of motion and neck supple  Right lower leg: No edema  Left lower leg: No edema  Lymphadenopathy:      Cervical: No cervical adenopathy  Skin:     General: Skin is warm and dry  Capillary Refill: Capillary refill takes less than 2 seconds  Neurological:      General: No focal deficit present  Mental Status: He is alert and oriented to person, place, and time  GCS: GCS eye subscore is 4  GCS verbal subscore is 5  GCS motor subscore is 6  Cranial Nerves: Cranial nerves are intact  No facial asymmetry  Sensory: Sensation is intact  Motor: Motor function is intact  Coordination: Coordination is intact  Gait: Gait is intact     Psychiatric:         Mood and Affect: Mood normal          Behavior: Behavior normal        OSKAR Alvares

## 2022-10-27 ENCOUNTER — APPOINTMENT (OUTPATIENT)
Dept: CT IMAGING | Facility: HOSPITAL | Age: 82
End: 2022-10-27

## 2022-10-27 ENCOUNTER — APPOINTMENT (EMERGENCY)
Dept: RADIOLOGY | Facility: HOSPITAL | Age: 82
End: 2022-10-27

## 2022-10-27 ENCOUNTER — HOSPITAL ENCOUNTER (INPATIENT)
Facility: HOSPITAL | Age: 82
LOS: 1 days | Discharge: HOME | End: 2022-10-29
Attending: EMERGENCY MEDICINE | Admitting: INTERNAL MEDICINE

## 2022-10-27 DIAGNOSIS — R94.31 ABNORMAL ECG: ICD-10-CM

## 2022-10-27 DIAGNOSIS — Z95.810 AICD (AUTOMATIC CARDIOVERTER/DEFIBRILLATOR) PRESENT: ICD-10-CM

## 2022-10-27 DIAGNOSIS — R07.9 CHEST PAIN, UNSPECIFIED: Primary | ICD-10-CM

## 2022-10-27 DIAGNOSIS — R07.2 PRECORDIAL PAIN: ICD-10-CM

## 2022-10-27 PROBLEM — I50.32 CHRONIC DIASTOLIC CONGESTIVE HEART FAILURE (HCC): Status: ACTIVE | Noted: 2021-12-17

## 2022-10-27 LAB
2HR DELTA HS TROPONIN: -2 NG/L
ALBUMIN SERPL BCP-MCNC: 3.8 G/DL (ref 3.5–5)
ALP SERPL-CCNC: 56 U/L (ref 46–116)
ALT SERPL W P-5'-P-CCNC: 30 U/L (ref 12–78)
ANION GAP SERPL CALCULATED.3IONS-SCNC: 10 MMOL/L (ref 4–13)
APTT PPP: 31 SECONDS (ref 23–37)
AST SERPL W P-5'-P-CCNC: 24 U/L (ref 5–45)
BASOPHILS # BLD AUTO: 0.02 THOUSANDS/ÂΜL (ref 0–0.1)
BASOPHILS NFR BLD AUTO: 0 % (ref 0–1)
BILIRUB SERPL-MCNC: 1.13 MG/DL (ref 0.2–1)
BUN SERPL-MCNC: 13 MG/DL (ref 5–25)
CALCIUM SERPL-MCNC: 8.8 MG/DL (ref 8.3–10.1)
CARDIAC TROPONIN I PNL SERPL HS: 10 NG/L
CARDIAC TROPONIN I PNL SERPL HS: 12 NG/L
CHLORIDE SERPL-SCNC: 102 MMOL/L (ref 96–108)
CO2 SERPL-SCNC: 27 MMOL/L (ref 21–32)
CREAT SERPL-MCNC: 1.25 MG/DL (ref 0.6–1.3)
DIGOXIN SERPL-MCNC: 1 NG/ML (ref 0.8–2)
EOSINOPHIL # BLD AUTO: 0.04 THOUSAND/ÂΜL (ref 0–0.61)
EOSINOPHIL NFR BLD AUTO: 1 % (ref 0–6)
ERYTHROCYTE [DISTWIDTH] IN BLOOD BY AUTOMATED COUNT: 15.2 % (ref 11.6–15.1)
GFR SERPL CREATININE-BSD FRML MDRD: 53 ML/MIN/1.73SQ M
GLUCOSE SERPL-MCNC: 107 MG/DL (ref 65–140)
HCT VFR BLD AUTO: 39 % (ref 36.5–49.3)
HGB BLD-MCNC: 12.5 G/DL (ref 12–17)
IMM GRANULOCYTES # BLD AUTO: 0 THOUSAND/UL (ref 0–0.2)
IMM GRANULOCYTES NFR BLD AUTO: 0 % (ref 0–2)
INR PPP: 1.38 (ref 0.84–1.19)
LYMPHOCYTES # BLD AUTO: 2.5 THOUSANDS/ÂΜL (ref 0.6–4.47)
LYMPHOCYTES NFR BLD AUTO: 55 % (ref 14–44)
MCH RBC QN AUTO: 28.7 PG (ref 26.8–34.3)
MCHC RBC AUTO-ENTMCNC: 32.1 G/DL (ref 31.4–37.4)
MCV RBC AUTO: 90 FL (ref 82–98)
MONOCYTES # BLD AUTO: 0.37 THOUSAND/ÂΜL (ref 0.17–1.22)
MONOCYTES NFR BLD AUTO: 8 % (ref 4–12)
NEUTROPHILS # BLD AUTO: 1.61 THOUSANDS/ÂΜL (ref 1.85–7.62)
NEUTS SEG NFR BLD AUTO: 36 % (ref 43–75)
NRBC BLD AUTO-RTO: 0 /100 WBCS
NT-PROBNP SERPL-MCNC: 151 PG/ML
PLATELET # BLD AUTO: 127 THOUSANDS/UL (ref 149–390)
PMV BLD AUTO: 12.1 FL (ref 8.9–12.7)
POTASSIUM SERPL-SCNC: 3.7 MMOL/L (ref 3.5–5.3)
PROT SERPL-MCNC: 8.2 G/DL (ref 6.4–8.4)
PROTHROMBIN TIME: 16.7 SECONDS (ref 11.6–14.5)
RBC # BLD AUTO: 4.35 MILLION/UL (ref 3.88–5.62)
SODIUM SERPL-SCNC: 139 MMOL/L (ref 135–147)
WBC # BLD AUTO: 4.54 THOUSAND/UL (ref 4.31–10.16)

## 2022-10-27 RX ORDER — MAGNESIUM SULFATE HEPTAHYDRATE 40 MG/ML
2 INJECTION, SOLUTION INTRAVENOUS ONCE
Status: COMPLETED | OUTPATIENT
Start: 2022-10-27 | End: 2022-10-27

## 2022-10-27 RX ORDER — ATORVASTATIN CALCIUM 20 MG/1
20 TABLET, FILM COATED ORAL
Status: DISCONTINUED | OUTPATIENT
Start: 2022-10-27 | End: 2022-10-29 | Stop reason: HOSPADM

## 2022-10-27 RX ORDER — ACETAMINOPHEN 325 MG/1
650 TABLET ORAL ONCE
Status: COMPLETED | OUTPATIENT
Start: 2022-10-27 | End: 2022-10-27

## 2022-10-27 RX ORDER — FINASTERIDE 5 MG/1
5 TABLET, FILM COATED ORAL DAILY
Status: DISCONTINUED | OUTPATIENT
Start: 2022-10-28 | End: 2022-10-29 | Stop reason: HOSPADM

## 2022-10-27 RX ORDER — TAMSULOSIN HYDROCHLORIDE 0.4 MG/1
0.4 CAPSULE ORAL
Status: DISCONTINUED | OUTPATIENT
Start: 2022-10-27 | End: 2022-10-29 | Stop reason: HOSPADM

## 2022-10-27 RX ORDER — PANTOPRAZOLE SODIUM 40 MG/1
40 TABLET, DELAYED RELEASE ORAL
Status: DISCONTINUED | OUTPATIENT
Start: 2022-10-28 | End: 2022-10-29 | Stop reason: HOSPADM

## 2022-10-27 RX ORDER — DIGOXIN 125 MCG
125 TABLET ORAL DAILY
Status: DISCONTINUED | OUTPATIENT
Start: 2022-10-28 | End: 2022-10-29 | Stop reason: HOSPADM

## 2022-10-27 RX ORDER — ASPIRIN 81 MG/1
81 TABLET ORAL DAILY
Status: DISCONTINUED | OUTPATIENT
Start: 2022-10-28 | End: 2022-10-29 | Stop reason: HOSPADM

## 2022-10-27 RX ORDER — ONDANSETRON 4 MG/1
4 TABLET, ORALLY DISINTEGRATING ORAL EVERY 6 HOURS PRN
Status: DISCONTINUED | OUTPATIENT
Start: 2022-10-27 | End: 2022-10-29 | Stop reason: HOSPADM

## 2022-10-27 RX ORDER — AMIODARONE HYDROCHLORIDE 200 MG/1
200 TABLET ORAL DAILY
Status: DISCONTINUED | OUTPATIENT
Start: 2022-10-28 | End: 2022-10-29 | Stop reason: HOSPADM

## 2022-10-27 RX ADMIN — ATORVASTATIN CALCIUM 20 MG: 20 TABLET, FILM COATED ORAL at 22:03

## 2022-10-27 RX ADMIN — SACUBITRIL AND VALSARTAN 1 TABLET: 49; 51 TABLET, FILM COATED ORAL at 19:29

## 2022-10-27 RX ADMIN — ONDANSETRON 4 MG: 4 TABLET, ORALLY DISINTEGRATING ORAL at 19:29

## 2022-10-27 RX ADMIN — TAMSULOSIN HYDROCHLORIDE 0.4 MG: 0.4 CAPSULE ORAL at 19:30

## 2022-10-27 RX ADMIN — ACETAMINOPHEN 650 MG: 325 TABLET ORAL at 06:29

## 2022-10-27 RX ADMIN — MAGNESIUM SULFATE HEPTAHYDRATE 2 G: 40 INJECTION, SOLUTION INTRAVENOUS at 06:29

## 2022-10-27 RX ADMIN — APIXABAN 5 MG: 5 TABLET, FILM COATED ORAL at 19:29

## 2022-10-27 NOTE — ASSESSMENT & PLAN NOTE
Has an ICD in place  EKG showing PVCs with incomplete RBBB  Getting regular device checks  Cardiology on board should the patient require additional interrogation of device

## 2022-10-27 NOTE — CONSULTS
Reason for Consult / Principal Problem: Chest Pain    Physician Requesting Consult:  Mich Joyner DO    Cardiologist: Dr Jeannette Ronquillo and Plan      Current Problem List   Active Problems:    Paroxysmal A-fib ([address] )    Mixed hyperlipidemia    Assessment/Plan:    1  Chest Pain  Patient endorses chest pain for the past two weeks directly around his ICD pocket  Patient states it feels like a pinching sensation and sometimes travels up his neck and he gets a headache  The pain is not associated with anything, but he the area is tender  · MI in 2013 in [place] requiring stent and ICD placement, with device checks as scheduled   · Trop - x2, BNP -  · CXR -  · EKG-PVC's Incomplete RBBB, non-specific T wave changes  · CT chest ordered  · Blood cultures ordered  · Device check   · Digoxin, Entresto, Amio  2  Hypertension  BP /57-66  ·  Currently controlled on Entresto      Subjective     CC: Chest pain    HPI: Alex Cervantes 80y o  year old male with PMH Afib, CHF, HTN, MI in 2013, ICD who presents with chest pain that has been occurring for the past 2 weeks  Patient states the pain is around the ICD pocket and hurts when he presses on it  Patient states he also gets random pains of pinching and burning  Patient states it does travel to his neck at times and then causes a headache  Patient did lose 20-30 pounds in 2020 and feels that device shifted  Denies shortness of breath, palpitations, orthopnea, PND, pedal edema, syncope, presyncope, diaphoresis, nausea/vomiting      Remainder of ROS done and negative    EKG:   Sinus rhythm with frequent Premature ventricular complexes in a pattern of bigeminy  Left axis deviation  Incomplete right bundle branch block  Cannot rule out Anteroseptal infarct (cited on or before 21-DEC-2021)  Prolonged QT  Abnormal ECG    ECHO:   ·  Left Ventricle: Left ventricular cavity size is normal  Wall thickness is normal  Systolic function is normal (55%)   Kim Gaytan motion is normal  Diastolic function is mildly abnormal, consistent with grade I (abnormal) relaxation  •   Right Ventricle: Right ventricular cavity size is dilated  Systolic function is normal  An ICD lead is present  •  Right Atrium: The atrium is mildly dilated  An ICD lead is present  •  Aortic Valve: There is mild to moderate regurgitation  •  Mitral Valve: There is mild annular calcification  There is mild regurgitation  •  Tricuspid Valve: There is moderate regurgitation  The right ventricular systolic pressure is normal   •  Pulmonic Valve: There is mild regurgitation  •  Aorta: 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)  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)  Cardiac Catheterization: Per patient-2013 in [address]  Interrogation: 3/2022-normal device check    CHEST X-RAY: negative      Family History: non-contributory  Historical Information   Past Medical History:   Diagnosis Date   • CHF (congestive heart failure) ([facility])    • Hyperlipidemia    • Hypertension    • MI (myocardial infarction) ([facility] [address] )    • Presence of cardiac defibrillator    • Prostate disorder      Past Surgical History:   Procedure Laterality Date   • CORONARY ANGIOPLASTY WITH STENT PLACEMENT       Social History   Social History     Substance and Sexual Activity   Alcohol Use Not Currently     Social History     Substance and Sexual Activity   Drug Use Never     Social History     Tobacco Use   Smoking Status Never Smoker   Smokeless Tobacco Never Used     Family History: No family history on file  Review of Systems:  Review of Systems   Constitutional: Negative for diaphoresis and fatigue  Respiratory: Positive for chest tightness  Negative for shortness of breath  Cardiovascular: Positive for chest pain  Negative for palpitations and leg swelling  Gastrointestinal: Negative for abdominal pain  Musculoskeletal: Negative for back pain  Neurological: Positive for headaches  Negative for dizziness             Scheduled Meds:  Continuous Infusions:No current facility-administered medications for this encounter  PRN Meds:   all current active meds have been reviewed    No Known Allergies    Objective   Vitals: Temp (24hrs), Av °F (36 7 °C), Min:98 °F (36 7 °C), Max:98 °F (36 7 °C)  Current: Temperature: 98 °F (36 7 °C)  Patient Vitals for the past 24 hrs:   BP Temp Temp src Pulse Resp SpO2   10/27/22 1100 111/59 -- -- 66 21 99 %   10/27/22 1000 135/68 -- -- 77 18 93 %   10/27/22 0900 114/57 -- -- 65 12 100 %   10/27/22 0830 99/60 -- -- 64 13 99 %   10/27/22 0730 122/62 -- -- 67 16 98 %   10/27/22 0630 96/57 -- -- 71 22 99 %   10/27/22 0600 108/57 -- -- 68 12 99 %   10/27/22 0437 -- 98 °F (36 7 °C) Oral -- -- --   10/27/22 0433 137/66 -- -- 83 18 97 %    There is no height or weight on file to calculate BMI  Orthostatic Blood Pressures    Flowsheet Row Most Recent Value   Blood Pressure 111/59 filed at 10/27/2022 1100   Patient Position - Orthostatic VS Lying filed at 10/27/2022 1100              Invasive Devices  Report    Peripheral Intravenous Line  Duration           Peripheral IV 22 Left Antecubital 174 days    Peripheral IV 10/27/22 Left;Proximal;Ventral (anterior) Forearm <1 day                Physical Exam:  Physical Exam  Vitals and nursing note reviewed  Constitutional:       Appearance: He is well-developed  HENT:      Head: Normocephalic and atraumatic  Eyes:      Conjunctiva/sclera: Conjunctivae normal    Cardiovascular:      Rate and Rhythm: Normal rate and regular rhythm  Heart sounds: No murmur heard  Pulmonary:      Effort: Pulmonary effort is normal  No respiratory distress  Breath sounds: Normal breath sounds  Abdominal:      Palpations: Abdomen is soft  Tenderness: There is no abdominal tenderness  Musculoskeletal:      Cervical back: Neck supple  Right lower leg: No edema  Left lower leg: No edema        Comments: ICD is mobile and tender   Skin:     General: Skin is warm and dry  Neurological:      Mental Status: He is alert  Lab Results:   Results from last 7 days   Lab Units 10/27/22  0446 10/26/22  1107   WBC Thousand/uL 4 54 3 64*   HEMOGLOBIN g/dL 12 5 13 7   HEMATOCRIT % 39 0 43 0   PLATELETS Thousands/uL 127* 126*   NEUTROS PCT % 36*  --    MONOS PCT % 8  --       Results from last 7 days   Lab Units 10/27/22  0446 10/26/22  1107   SODIUM mmol/L 139 139   POTASSIUM mmol/L 3 7 4 7   CHLORIDE mmol/L 102 102   CO2 mmol/L 27 26   BUN mg/dL 13 12   CREATININE mg/dL 1 25 1 26   CALCIUM mg/dL 8 8 9 0   ALK PHOS U/L 56 54   ALT U/L 30 31   AST U/L 24 30   MAGNESIUM mg/dL  --  2 2   INR  1 38*  --    PTT seconds 31  --    EGFR ml/min/1 73sq m 53 52     Results from last 7 days   Lab Units 10/27/22  0446   INR  1 38*   PTT seconds 31             No results found for: PHART, MAH2THL, PO2ART, NFC5JFS, M1BSMQDA, BEART, SOURCE  No components found for: HIV1X2  No results found for: HAV, HEPAIGM, HEPBIGM, HEPBCAB, HBEAG, HEPCAB  No results found for: SPEP, UPEP   Lab Results   Component Value Date    HGBA1C 5 0 10/26/2022    HGBA1C 5 0 09/07/2021     No results found for: CHOL   Lab Results   Component Value Date    HDL 67 10/26/2022    HDL 61 09/07/2021      Lab Results   Component Value Date    LDLCALC 50 10/26/2022    LDLCALC 64 09/07/2021      Lab Results   Component Value Date    TRIG 75 10/26/2022    TRIG 74 09/07/2021     No components found for: PROCAL          Imaging: I have personally reviewed pertinent reports          [address] [facility],[address]  Internal Medicine Residency PGY-1

## 2022-10-27 NOTE — ED PROVIDER NOTES
Pt Name: Cecy Campo  MRN: [id number]  [place] 1940  Age/Sex: 80 y o  male  Date of evaluation: 10/27/2022  PCP: Ligia Morrow [address] [place] [address]    Chief Complaint   Patient presents with   • Chest Pain     Pt arrived via wheelchair with c/o cp around defibrillator  Numbness and tingling  HPI    80 y o  male presenting with chest pain  Patient states that yesterday he began having chest pain around his defibrillator  Pain is dull, burning, around the edges of the fibrillator, worse with pressing on it or moving the device , also better with sitting up standing and walking around, worse with lying flat  He denies fever, shortness of breath, nausea vomiting diarrhea, other symptoms  HPI      Past Medical and Surgical History    Past Medical History:   Diagnosis Date   • CHF (congestive heart failure) ([facility] [address] )    • Hyperlipidemia    • Hypertension    • [facility]    • Presence of cardiac defibrillator    • Prostate disorder        Past Surgical History:   Procedure Laterality Date   • CORONARY ANGIOPLASTY WITH STENT PLACEMENT         No family history on file  Social History     Tobacco Use   • Smoking status: Never Smoker   • Smokeless tobacco: Never Used   Vaping Use   • Vaping Use: Never used   Substance Use Topics   • Alcohol use: Not Currently   • Drug use: Never           Allergies    No Known Allergies    Home Medications    Prior to Admission medications    Medication Sig Start Date End Date Taking?  Authorizing Provider   amiodarone 200 mg tablet TAKE 1 TABLET BY MOUTH EVERY DAY 10/11/22   Linda Biggs MD   apixaban (ELIQUIS) 5 mg Take 5 mg by mouth 2 (two) times a day    Historical Provider, MD   aspirin (ECOTRIN LOW STRENGTH) 81 mg EC tablet Take 81 mg by mouth daily    Historical Provider, MD   atorvastatin (LIPITOR) 20 mg tablet Take 1 tablet (20 mg total) by mouth daily  Patient taking differently: Take 20 mg by mouth daily at bedtime 3/29/22   OSKAR Rahman   Dapagliflozin Propanediol (Farxiga) 10 MG TABS Take 1 tablet (10 mg total) by mouth daily 12/17/21   OSKAR Rahman   digoxin (LANOXIN) 0 125 mg tablet Take 1 tablet (125 mcg total) by mouth daily 7/27/22   Heather Funk MD   finasteride (PROSCAR) 5 mg tablet Take 1 tablet (5 mg total) by mouth daily 5/9/22   Renetta Garg PA-C   hydrocortisone 2 5 % cream APPLY TO AFFECTED AREA TWICE A DAY 10/11/22   OSKAR Rahman   Multiple Vitamin (Daily-Misael Multivitamin) TABS TAKE 1 TABLET BY MOUTH EVERY DAY 8/2/22   OSKAR Rahman   omeprazole (PriLOSEC) 40 MG capsule TAKE 1 CAPSULE BY MOUTH AS NEEDED (ACID REFLUX SYMPTOMS) 10/11/22   OSKAR Rahman   ondansetron (ZOFRAN) 4 mg tablet Take 1 tablet (4 mg total) by mouth every 8 (eight) hours as needed for nausea or vomiting 9/14/22   OSKAR Rahman   sacubitril-valsartan (Entresto) 49-51 MG TABS Take 1 tablet by mouth 2 (two) times a day 6/28/22   Heather Funk MD   tamsulosin [facility]) 0 4 mg Take 1 capsule (0 4 mg total) by mouth daily with dinner 5/9/22   Renetta Garg PA-C           Review of Systems    Review of Systems   Constitutional: Negative for appetite change, chills and diaphoresis  HENT: Negative for drooling, facial swelling, trouble swallowing and voice change  Respiratory: Negative for apnea, shortness of breath and wheezing  Cardiovascular: Positive for chest pain  Negative for leg swelling  Gastrointestinal: Negative for abdominal distention, abdominal pain, diarrhea, nausea and vomiting  Genitourinary: Negative for dysuria and urgency  Musculoskeletal: Negative for arthralgias, back pain, gait problem and neck pain  Skin: Negative for color change, rash and wound  Neurological: Negative for seizures, speech difficulty, weakness and headaches  Psychiatric/Behavioral: Negative for agitation, behavioral problems and dysphoric mood  The patient is not nervous/anxious  All other systems reviewed and negative  Physical Exam      ED Triage Vitals   Temperature Pulse Respirations Blood Pressure SpO2   10/27/22 0437 10/27/22 0433 10/27/22 0433 10/27/22 0433 10/27/22 0433   98 °F (36 7 °C) 83 18 137/66 97 %      Temp Source Heart Rate Source Patient Position - Orthostatic VS BP Location FiO2 (%)   10/27/22 0437 10/27/22 0433 10/27/22 0433 10/27/22 0433 --   Oral Monitor Sitting Right arm       Pain Score       --                      Physical Exam  Vitals and nursing note reviewed  Constitutional:       General: He is not in acute distress  Appearance: He is well-developed  He is not ill-appearing, toxic-appearing or diaphoretic  HENT:      Head: Normocephalic and atraumatic  Right Ear: External ear normal       Left Ear: External ear normal       Nose: Nose normal  No congestion or rhinorrhea  Mouth/Throat:      Mouth: Mucous membranes are moist       Pharynx: Oropharynx is clear  Eyes:      Conjunctiva/sclera: Conjunctivae normal       Pupils: Pupils are equal, round, and reactive to light  Neck:      Trachea: No tracheal deviation  Cardiovascular:      Rate and Rhythm: Normal rate and regular rhythm  Heart sounds: Normal heart sounds  No murmur heard  Pulmonary:      Effort: Pulmonary effort is normal  No respiratory distress  Breath sounds: Normal breath sounds  No stridor  No wheezing or rales  Comments: Palpation of patient's AICD in the left upper chest exactly reproduces his pain, particularly pressing on the medial and inferior aspect  No erythema, no fluctuance, no swelling  Chest:      Chest wall: Tenderness present  Abdominal:      General: There is no distension  Palpations: Abdomen is soft  Tenderness: There is no abdominal tenderness  There is no guarding or rebound  Musculoskeletal:         General: No deformity  Normal range of motion  Cervical back: Normal range of motion and neck supple  Skin:     General: Skin is warm and dry  Capillary Refill: Capillary refill takes less than 2 seconds  Findings: No rash  Neurological:      Mental Status: He is alert and oriented to person, place, and time  Psychiatric:         Behavior: Behavior normal          Thought Content: Thought content normal          Judgment: Judgment normal               Diagnostic Results    EKG Interpretation    Rate:  82  BPM  Rhythm:  Sinus rhythm with PACs in a pattern of bigeminy as well as PVCs   Axis:  Normal   Intervals:  Incomplete right bundle-branch block, left anterior fascicular block QTc  516 ms  Q waves:  No pathologic Q waves   T waves:  Normal   ST segments:  No significant elevations or depressions     Impression:  Sinus rhythm with PACs in a pattern of bigeminy as well as premature ventricular contractions and prolonged QTC as well as the left anterior fascicular block and incomplete right bundle-branch block but without evidence of acute ischemia or significant arrhythmia      EKG for comparison:  EKG dated 21 December 2021 similar in character with no major changes    EKG interpreted by me       Labs:    Results Reviewed     Procedure Component Value Units Date/Time    Comprehensive metabolic panel [[id number]]  (Abnormal) Collected: 10/27/22 0446    Lab Status: Final result Specimen: Blood from Arm, Left Updated: 10/27/22 0519     Sodium 139 mmol/L      Potassium 3 7 mmol/L      Chloride 102 mmol/L      CO2 27 mmol/L      ANION GAP 10 mmol/L      BUN 13 mg/dL      Creatinine 1 25 mg/dL      Glucose 107 mg/dL      Calcium 8 8 mg/dL      AST 24 U/L      ALT 30 U/L      Alkaline Phosphatase 56 U/L      Total Protein 8 2 g/dL      Albumin 3 8 g/dL      Total Bilirubin 1 13 mg/dL      eGFR 53 ml/min/1 73sq m     Narrative:      [facility] guidelines for Chronic Kidney Disease (CKD):   •  Stage 1 with normal or high GFR (GFR > 90 mL/min/1 73 square meters)  •  Stage 2 Mild CKD (GFR = 60-89 mL/min/1 73 square meters)  •  Stage 3A Moderate CKD (GFR = 45-59 mL/min/1 73 square meters)  •  Stage 3B Moderate CKD (GFR = 30-44 mL/min/1 73 square meters)  •  Stage 4 Severe CKD (GFR = 15-29 mL/min/1 73 square meters)  •  Stage 5 End Stage CKD (GFR <15 mL/min/1 73 square meters)  Note: GFR calculation is accurate only with a steady state creatinine    NT-BNP PRO [[id number]]  (Normal) Collected: 10/27/22 0446    Lab Status: Final result Specimen: Blood from Arm, Left Updated: 10/27/22 0519     NT-proBNP 151 pg/mL     APTT [[id number]]  (Normal) Collected: 10/27/22 0446    Lab Status: Final result Specimen: Blood from Arm, Left Updated: 10/27/22 0518     PTT 31 seconds     Protime-INR [[id number]]  (Abnormal) Collected: 10/27/22 0446    Lab Status: Final result Specimen: Blood from Arm, Left Updated: 10/27/22 0518     Protime 16 7 seconds      INR 1 38    HS Troponin I 2hr [[id number]]     Lab Status: No result Specimen: Blood     HS Troponin 0hr (reflex protocol) [[id number]]  (Normal) Collected: 10/27/22 0446    Lab Status: Final result Specimen: Blood from Arm, Left Updated: 10/27/22 0518     hs TnI 0hr 12 ng/L     CBC and differential [[id number]]  (Abnormal) Collected: 10/27/22 0446    Lab Status: Final result Specimen: Blood from Arm, Left Updated: 10/27/22 0451     WBC 4 54 Thousand/uL      RBC 4 35 Million/uL      Hemoglobin 12 5 g/dL      Hematocrit 39 0 %      MCV 90 fL      MCH 28 7 pg      MCHC 32 1 g/dL      RDW 15 2 %      MPV 12 1 fL      Platelets 997 Thousands/uL      nRBC 0 /100 WBCs      Neutrophils Relative 36 %      Immat GRANS % 0 %      Lymphocytes Relative 55 %      Monocytes Relative 8 %      Eosinophils Relative 1 %      Basophils Relative 0 %      Neutrophils Absolute 1 61 Thousands/µL      Immature Grans Absolute 0 00 Thousand/uL      Lymphocytes Absolute 2 50 Thousands/µL      Monocytes Absolute 0 37 Thousand/µL      Eosinophils Absolute 0 04 Thousand/µL      Basophils Absolute 0 02 Thousands/µL           All labs reviewed and utilized in the medical decision making process    Radiology:    XR chest 1 view portable    (Results Pending)       All radiology studies independently viewed by me and interpreted by the radiologist     Procedure    Procedures        ED Course of Care and Re-Assessments      Initial labs reassuring, 1st troponin 12, plan formed for delta troponin  Medications   acetaminophen (TYLENOL) tablet 650 mg (has no administration in time range)   magnesium sulfate 2 g/50 mL IVPB (premix) 2 g (has no administration in time range)           FINAL IMPRESSION    Final diagnoses:   Chest pain, unspecified   Abnormal ECG       HEART score:    History 0=Slightly or non-suspicious   ECG 0=Normal   Age 2= > 65 years   Risk Factors 2= > 3 risk factors, or history of atherosclerotic disease   Troponin 0= Less than or equal to 12 ng/L   Total 4          DISPOSITION/PLAN    Presentation as above with chest wall pain at the site of AICD  Vital signs reassuring, examination likewise reassuring  Patient's chest pain appears superficial is exactly reproducible with manipulation of the AICD  Based on improvement with exertion, reproducibility, felt to be low suspicion for ACS at this time  Likewise, low suspicion for aortic dissection, pneumothorax, PE, other acute life threat  EKG abnormal as above, not frankly ischemic but significant ectopy, given magnesium empirically  Pending delta troponin at time of sign out to Dr Kimberly Taylor at shift change, hemodynamically stable and comfortable at that time    Time reflects when diagnosis was documented in both MDM as applicable and the Disposition within this note     Time User Action Codes Description Comment    10/27/2022  6:23 AM Lauri REDMOND Add [R07 9] Chest pain, unspecified     10/27/2022  6:23 AM Lauri REDMOND Add [R94 31] Abnormal ECG       ED Disposition     None      Follow-up Information    None           PATIENT REFERRED TO:    No follow-up provider specified  DISCHARGE MEDICATIONS:    Patient's Medications   Discharge Prescriptions    No medications on file       No discharge procedures on file  Néstor Strange MD    Portions of the record may have been created with voice recognition software  Occasional wrong word or "sound alike" substitutions may have occurred due to the inherent limitations of voice recognition software    Please read the chart carefully and recognize, using context, where substitutions have occurred     Néstor Strange MD  10/27/22 0722

## 2022-10-27 NOTE — ASSESSMENT & PLAN NOTE
Patient having chest pain for the past 2 weeks worsening  Described as pinching sensation radiating to neck associated with headache and mild tenderness to the area on the neck and area of pacemaker    Lab Results   Component Value Date    HSTNI0 12 10/27/2022    HSTNI2 10 10/27/2022    HSTNID2 -2 10/27/2022     Lab Results   Component Value Date    NTBNP 151 10/27/2022     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  Cardiology brought on board for further workup  · Appreciate recommendations  · Monitor on telemetry

## 2022-10-27 NOTE — H&P
[address] [facility] 1940, 80 y o  male MRN: [id number]  Unit/Bed#: -01 Encounter: [id number]  Primary Care Provider: OSKAR Unger   Date and time admitted to hospital: 10/27/2022  4:36 AM    * Precordial pain  Assessment & Plan  Patient having chest pain for the past 2 weeks worsening  Described as pinching sensation radiating to neck associated with headache and mild tenderness to the area on the neck and area of pacemaker    Lab Results   Component Value Date    HSTNI0 12 10/27/2022    HSTNI2 10 10/27/2022    HSTNID2 -2 10/27/2022     Lab Results   Component Value Date    NTBNP 151 10/27/2022     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  Cardiology brought on board for further workup  · Appreciate recommendations  · Monitor on telemetry    Chronic diastolic congestive heart failure ([address] )  Assessment & Plan  Wt Readings from Last 3 Encounters:   10/26/22 72 2 kg (159 lb 3 2 oz)   09/14/22 74 4 kg (164 lb)   08/24/22 72 6 kg (160 lb)     Results for orders placed during the hospital encounter of 03/24/22    Echo complete w/ contrast if indicated    Interpretation Summary  •  Left Ventricle: Left ventricular cavity size is normal  Wall thickness is normal  Systolic function is normal (55%)  Wall motion is normal  Diastolic function is mildly abnormal, consistent with grade I (abnormal) relaxation  •  Right Ventricle: Right ventricular cavity size is dilated  Systolic function is normal  An ICD lead is present  •  Right Atrium: The atrium is mildly dilated  An ICD lead is present  •  Aortic Valve: There is mild to moderate regurgitation  •  Mitral Valve: There is mild annular calcification  There is mild regurgitation  •  Tricuspid Valve: There is moderate regurgitation  The right ventricular systolic pressure is normal   •  Pulmonic Valve: There is mild regurgitation  •  Aorta: 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)   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)  Not in acute exac   · Continue enestro  · SGLT 2-empagliflozin as dapagliflozin is not on formulary  · Monitor I/O, daily weight  · Does not appear to be in exacerbation at this time  · Cardiology on board-appreciate recommendations        AICD (automatic cardioverter/defibrillator) present  Assessment & Plan  Has an ICD in place  EKG showing PVCs with incomplete RBBB  Getting regular device checks  Cardiology on board should the patient require additional interrogation of device    Mixed hyperlipidemia  Assessment & Plan  · Continue statin    Paroxysmal A-fib ([address] )  Assessment & Plan  · Currently in NSR; paced  · continue amiodarone, digoxin, eliquis     Atherosclerosis of native coronary artery of native heart without angina pectoris  Assessment & Plan  Continue home meds    VTE Pharmacologic Prophylaxis: VTE Score: 3 Moderate Risk (Score 3-4) - Pharmacological DVT Prophylaxis Ordered: apixaban (Eliquis)  Code Status: Level 1 - Full Code   Discussion with family: Attempted to update  (daughter) via phone  Unable to contact  Anticipated Length of Stay: Patient will be admitted on an inpatient basis with an anticipated length of stay of greater than 2 midnights secondary to Cardiac evaluation with precordial chest pain  Total Time for Visit, including Counseling / Coordination of Care: 70 minutes Greater than 50% of this total time spent on direct patient counseling and coordination of care  Chief Complaint:  Chest burning/stinging sensation    History of Present Illness:  John Khoury is a 80 y o  male who presents with chest burning sensation along with stinging sensation  PMHx significant for AFib on Eliquis, HTN, CAD status post MI in 2013, ICD in place  Patient reported chest burning/stinging sensation starts in the location of the ICD site along with radiation to the left lateral aspect of the neck  He also has associated headaches    Reports sharp sensation as well that is intermittent and worsening in severity and frequency reported over the past 1-2 days  Denied any ICD shocks and has regular follow-up with outpatient cardiologist along with regular interrogations of the pacemaker  Pacemaker was placed in [place] at [facility]     Patient denies any associated nausea, vomiting, abdominal pain, changes in appetite or bowel habits  Denies any shortness of breath, palpitations    Due to significant cardiac history and burning chest pain, patient is admitted to the some service for monitoring and cardiac evaluation  Review of Systems:  Review of Systems   Constitutional: Negative for activity change, appetite change and fever  HENT: Negative for congestion  Eyes: Negative for visual disturbance  Respiratory: Negative for cough, shortness of breath and wheezing  Cardiovascular: Positive for chest pain  Negative for palpitations and leg swelling  Gastrointestinal: Negative for abdominal pain, constipation, diarrhea, nausea and vomiting  Genitourinary: Negative for decreased urine volume, difficulty urinating, dysuria, frequency and urgency  Musculoskeletal: Negative for arthralgias and myalgias  Skin: Negative for pallor and rash  Neurological: Positive for headaches  Negative for dizziness, syncope, speech difficulty, weakness, light-headedness and numbness  Psychiatric/Behavioral: Negative for confusion  All other systems reviewed and are negative  Past Medical and Surgical History:   Past Medical History:   Diagnosis Date   • CHF (congestive heart failure) ([facility] )    • Hyperlipidemia    • Hypertension    • MI [facility] infarction[facility] )    • Presence of cardiac defibrillator    • Prostate disorder        Past Surgical History:   Procedure Laterality Date   • CORONARY ANGIOPLASTY WITH STENT PLACEMENT         Meds/Allergies:  Prior to Admission medications    Medication Sig Start Date End Date Taking? Authorizing Provider   amiodarone 200 mg tablet TAKE 1 TABLET BY MOUTH EVERY DAY 10/11/22  Yes Francisco J Small MD   apixaban (ELIQUIS) 5 mg Take 5 mg by mouth 2 (two) times a day   Yes Historical Provider, MD   aspirin (ECOTRIN LOW STRENGTH) 81 mg EC tablet Take 81 mg by mouth daily   Yes Historical Provider, MD   atorvastatin (LIPITOR) 20 mg tablet Take 1 tablet (20 mg total) by mouth daily  Patient taking differently: Take 20 mg by mouth daily at bedtime 3/29/22  Yes OSKAR Rahman   Dapagliflozin Propanediol (Farxiga) 10 MG TABS Take 1 tablet (10 mg total) by mouth daily 12/17/21  Yes OSKAR Rahman   digoxin (LANOXIN) 0 125 mg tablet Take 1 tablet (125 mcg total) by mouth daily 7/27/22  Yes Jumana Gannon MD   finasteride (PROSCAR) 5 mg tablet Take 1 tablet (5 mg total) by mouth daily 5/9/22  Yes Jenniffer Aguirre PA-C   omeprazole (PriLOSEC) 40 MG capsule TAKE 1 CAPSULE BY MOUTH AS NEEDED (ACID REFLUX SYMPTOMS) 10/11/22  Yes OSKAR Rahman   ondansetron (ZOFRAN) 4 mg tablet Take 1 tablet (4 mg total) by mouth every 8 (eight) hours as needed for nausea or vomiting 9/14/22  Yes OSKAR Rahman   sacubitril-valsartan (Entresto) 49-51 MG TABS Take 1 tablet by mouth 2 (two) times a day 6/28/22  Yes Jumana Gannon MD   hydrocortisone 2 5 % cream APPLY TO AFFECTED AREA TWICE A DAY 10/11/22   OSKAR Rahman   Multiple Vitamin (Daily-Misael Multivitamin) TABS TAKE 1 TABLET BY MOUTH EVERY DAY 8/2/22   OSKAR Rahman   tamsulosin (FLOMAX) 0 4 mg Take 1 capsule (0 4 mg total) by mouth daily with dinner 5/9/22   Jenniffer Aguirre PA-C     I have reviewed home medications using recent Epic encounter  Allergies: No Known Allergies    Social History:  Marital Status:     Occupation:  Not applicable  Patient Pre-hospital Living Situation: Home  Patient Pre-hospital Level of Mobility: unable to be assessed at time of evaluation  Patient Pre-hospital Diet Restrictions:  Cardiac diet  Substance Use History:   Social History     Substance and Sexual Activity   Alcohol Use Not Currently     Social History     Tobacco Use   Smoking Status Never Smoker   Smokeless Tobacco Never Used     Social History     Substance and Sexual Activity   Drug Use Never       Family History:  No family history on file  Physical Exam:     Vitals:   Blood Pressure: 113/57 (10/27/22 1644)  Pulse: 66 (10/27/22 1708)  Temperature: 98 4 °F (36 9 °C) (10/27/22 1708)  Temp Source: Oral (10/27/22 0437)  Respirations: 18 (10/27/22 1644)  SpO2: 100 % (10/27/22 1708)    Physical Exam  Vitals and nursing note reviewed  Constitutional:       General: He is not in acute distress  Appearance: He is well-developed  He is not diaphoretic  HENT:      Head: Normocephalic and atraumatic  Nose: Nose normal    Eyes:      General: No scleral icterus  Conjunctiva/sclera: Conjunctivae normal       Pupils: Pupils are equal, round, and reactive to light  Neck:      Thyroid: No thyromegaly  Cardiovascular:      Rate and Rhythm: Normal rate and regular rhythm  Pulses:           Radial pulses are 2+ on the right side and 2+ on the left side  Heart sounds: Normal heart sounds  No murmur heard  No friction rub  No gallop  Pulmonary:      Effort: Pulmonary effort is normal  No respiratory distress  Breath sounds: Normal breath sounds  No stridor  No wheezing or rales  Chest:      Chest wall: No deformity (Pacemaker) or tenderness  Comments: No rashes, or redness noted  Abdominal:      General: Bowel sounds are normal  There is no distension  Palpations: Abdomen is soft  There is no mass  Tenderness: There is no abdominal tenderness  Musculoskeletal:         General: No deformity  Normal range of motion  Cervical back: Normal range of motion and neck supple  Right lower leg: No edema  Left lower leg: No edema  Skin:     General: Skin is warm and dry  Neurological:      Mental Status: He is alert and oriented to person, place, and time  Psychiatric:         Behavior: Behavior normal          Thought Content: Thought content normal          Judgment: Judgment normal           Additional Data:     Lab Results:  Results from last 7 days   Lab Units 10/27/22  0446   WBC Thousand/uL 4 54   HEMOGLOBIN g/dL 12 5   HEMATOCRIT % 39 0   PLATELETS Thousands/uL 127*   NEUTROS PCT % 36*   LYMPHS PCT % 55*   MONOS PCT % 8   EOS PCT % 1     Results from last 7 days   Lab Units 10/27/22  0446   SODIUM mmol/L 139   POTASSIUM mmol/L 3 7   CHLORIDE mmol/L 102   CO2 mmol/L 27   BUN mg/dL 13   CREATININE mg/dL 1 25   ANION GAP mmol/L 10   CALCIUM mg/dL 8 8   ALBUMIN g/dL 3 8   TOTAL BILIRUBIN mg/dL 1 13*   ALK PHOS U/L 56   ALT U/L 30   AST U/L 24   GLUCOSE RANDOM mg/dL 107     Results from last 7 days   Lab Units 10/27/22  0446   INR  1 38*         Results from last 7 days   Lab Units 10/26/22  1107   HEMOGLOBIN A1C % 5 0           Imaging: Reviewed radiology reports from this admission including: chest xray and chest CT scan  CT chest wo contrast   Final Result by Itzel Quinteros MD (10/27 7774)      Left-sided single-lead AICD in place  Evaluation of the soft tissues immediately surrounding the generator is limited by streak artifact  However, no definite fluid collections are seen and it is unchanged in appearance compared to May 2022  No acute pulmonary pathology  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

## 2022-10-27 NOTE — Clinical Note
Case was discussed with CASSY and the patient's admission status was agreed to be Admission Status: observation status to the service of

## 2022-10-27 NOTE — ASSESSMENT & PLAN NOTE
Wt Readings from Last 3 Encounters:   10/26/22 72 2 kg (159 lb 3 2 oz)   09/14/22 74 4 kg (164 lb)   08/24/22 72 6 kg (160 lb)     Results for orders placed during the hospital encounter of 03/24/22    Echo complete w/ contrast if indicated    Interpretation Summary  •  Left Ventricle: Left ventricular cavity size is normal  Wall thickness is normal  Systolic function is normal (55%)  Wall motion is normal  Diastolic function is mildly abnormal, consistent with grade I (abnormal) relaxation  •  Right Ventricle: Right ventricular cavity size is dilated  Systolic function is normal  An ICD lead is present  •  Right Atrium: The atrium is mildly dilated  An ICD lead is present  •  Aortic Valve: There is mild to moderate regurgitation  •  Mitral Valve: There is mild annular calcification  There is mild regurgitation  •  Tricuspid Valve: There is moderate regurgitation  The right ventricular systolic pressure is normal   •  Pulmonic Valve: There is mild regurgitation  •  Aorta: 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

## 2022-10-27 NOTE — ED NOTES
10/17/22 1930   Patient Assessment/Suction   Level of Consciousness (AVPU) alert   Respiratory Effort Short of breath   Expansion/Accessory Muscles/Retractions no use of accessory muscles;no retractions;expansion symmetric   Skin Integrity   $ Wound Care Tech Time 15 min   Area Observed Left;Right;Cheek;Behind ear   Skin Appearance without discoloration   PRE-TX-O2   O2 Device (Oxygen Therapy) nasal cannula   $ Is the patient on Low Flow Oxygen? Yes   Flow (L/min) 3   SpO2 95 %   Pulse Oximetry Type Intermittent   $ Pulse Oximetry - Multiple Charge Pulse Oximetry - Multiple   Pulse 74   Resp 18   Aerosol Therapy   $ Aerosol Therapy Charges Aerosol Treatment   Daily Review of Necessity (SVN) completed   Respiratory Treatment Status (SVN) given   Treatment Route (SVN) mouthpiece   Patient Position (SVN) HOB elevated   Post Treatment Assessment (SVN) breath sounds unchanged   Signs of Intolerance (SVN) none   Preset CPAP/BiPAP Settings   Mode Of Delivery CPAP;Standby   $ CPAP/BiPAP Daily Charge BiPAP/CPAP Daily   $ Initial CPAP/BiPAP Setup? No   $ Is patient using? Yes   Size of Mask Small   Sized Appropriately? Yes   Equipment Type V60   Airway Device Type small full face mask   Humidifier not applicable   CPAP (cm H2O) 10   Respiratory Evaluation   $ Care Plan Tech Time 15 min   $ Eval/Re-eval Charges Evaluation      XR at bedside       Michael Mccoy RN  10/27/22 2246

## 2022-10-27 NOTE — PLAN OF CARE
Problem: PAIN - ADULT  Goal: Verbalizes/displays adequate comfort level or baseline comfort level  Description: Interventions:  - Encourage patient to monitor pain and request assistance  - Assess pain using appropriate pain scale  - Administer analgesics based on type and severity of pain and evaluate response  - Implement non-pharmacological measures as appropriate and evaluate response  - Consider cultural and social influences on pain and pain management  - Notify physician/advanced practitioner if interventions unsuccessful or patient reports new pain  Outcome: Progressing  Goal: Ability to express needs and understand communication  Outcome: Progressing     Problem: INFECTION - ADULT  Goal: Absence or prevention of progression during hospitalization  Description: INTERVENTIONS:  - Assess and monitor for signs and symptoms of infection  - Monitor lab/diagnostic results  - Monitor all insertion sites, i e  indwelling lines, tubes, and drains  - Monitor endotracheal if appropriate and nasal secretions for changes in amount and color  - [place] appropriate cooling/warming therapies per order  - Administer medications as ordered  - Instruct and encourage patient and family to use good hand hygiene technique  - Identify and instruct in appropriate isolation precautions for identified infection/condition  Outcome: Progressing  Goal: Absence of fever/infection during neutropenic period  Description: INTERVENTIONS:  - Monitor WBC    Outcome: Progressing     Problem: SAFETY ADULT  Goal: Patient will remain free of falls  Description: INTERVENTIONS:  - Educate patient/family on patient safety including physical limitations  - Instruct patient to call for assistance with activity   - Consult OT/PT to assist with strengthening/mobility   - Keep Call bell within reach  - Keep bed low and locked with side rails adjusted as appropriate  - Keep care items and personal belongings within reach  - Initiate and maintain comfort rounds  - Make Fall Risk Sign visible to staff  - Offer Toileting every 2 Hours, in advance of need  - Initiate/Maintain alarms  - Obtain necessary fall risk management equipment  - Apply yellow socks and bracelet for high fall risk patients  - Consider moving patient to room near nurses station  Outcome: Progressing  Goal: Maintain or return to baseline ADL function  Description: INTERVENTIONS:  -  Assess patient's ability to carry out ADLs; assess patient's baseline for ADL function and identify physical deficits which impact ability to perform ADLs (bathing, care of mouth/teeth, toileting, grooming, dressing, etc )  - Assess/evaluate cause of self-care deficits   - Assess range of motion  - Assess patient's mobility; develop plan if impaired  - Assess patient's need for assistive devices and provide as appropriate  - Encourage maximum independence but intervene and supervise when necessary  - Involve family in performance of ADLs  - Assess for home care needs following discharge   - Consider OT consult to assist with ADL evaluation and planning for discharge  - Provide patient education as appropriate  Outcome: Progressing  Goal: Maintains/Returns to pre admission functional level  Description: INTERVENTIONS:  - Perform BMAT or MOVE assessment daily    - Set and communicate daily mobility goal to care team and patient/family/caregiver  - Collaborate with rehabilitation services on mobility goals if consulted  - Perform Range of Motion 3 times a day  - Reposition patient every 2 hours    - Dangle patient 3 times a day  - Stand patient 3 times a day  - Ambulate patient 3 times a day  - Out of bed to chair 3 times a day   - Out of bed for meals 3 times a day  - Out of bed for toileting  - Record patient progress and toleration of activity level   Outcome: Progressing

## 2022-10-28 ENCOUNTER — APPOINTMENT (OUTPATIENT)
Dept: NUCLEAR MEDICINE | Facility: HOSPITAL | Age: 82
End: 2022-10-28

## 2022-10-28 ENCOUNTER — APPOINTMENT (OUTPATIENT)
Dept: NON INVASIVE DIAGNOSTICS | Facility: HOSPITAL | Age: 82
End: 2022-10-28

## 2022-10-28 PROBLEM — D63.8 ANEMIA IN OTHER CHRONIC DISEASES CLASSIFIED ELSEWHERE: Status: ACTIVE | Noted: 2022-10-28

## 2022-10-28 LAB
ALBUMIN SERPL BCP-MCNC: 3 G/DL (ref 3.5–5)
ALP SERPL-CCNC: 49 U/L (ref 46–116)
ALT SERPL W P-5'-P-CCNC: 26 U/L (ref 12–78)
ANION GAP SERPL CALCULATED.3IONS-SCNC: 6 MMOL/L (ref 4–13)
AORTIC ROOT: 3.9 CM
APICAL FOUR CHAMBER EJECTION FRACTION: 61 %
ASCENDING AORTA: 4 CM
AST SERPL W P-5'-P-CCNC: 30 U/L (ref 5–45)
ATRIAL RATE: 63 BPM
ATRIAL RATE: 72 BPM
AV REGURGITATION PRESSURE HALF TIME: 496 MS
BASELINE ST DEPRESSION: 0 MM
BASOPHILS # BLD AUTO: 0.03 THOUSANDS/ÂΜL (ref 0–0.1)
BASOPHILS NFR BLD AUTO: 1 % (ref 0–1)
BILIRUB SERPL-MCNC: 0.93 MG/DL (ref 0.2–1)
BUN SERPL-MCNC: 12 MG/DL (ref 5–25)
CALCIUM ALBUM COR SERPL-MCNC: 9.1 MG/DL (ref 8.3–10.1)
CALCIUM SERPL-MCNC: 8.3 MG/DL (ref 8.3–10.1)
CHEST PAIN STATEMENT: NORMAL
CHLORIDE SERPL-SCNC: 104 MMOL/L (ref 96–108)
CO2 SERPL-SCNC: 27 MMOL/L (ref 21–32)
CREAT SERPL-MCNC: 1.08 MG/DL (ref 0.6–1.3)
EOSINOPHIL # BLD AUTO: 0.04 THOUSAND/ÂΜL (ref 0–0.61)
EOSINOPHIL NFR BLD AUTO: 1 % (ref 0–6)
ERYTHROCYTE [DISTWIDTH] IN BLOOD BY AUTOMATED COUNT: 15.2 % (ref 11.6–15.1)
FRACTIONAL SHORTENING: 32 % (ref 28–44)
GFR SERPL CREATININE-BSD FRML MDRD: 63 ML/MIN/1.73SQ M
GLUCOSE P FAST SERPL-MCNC: 92 MG/DL (ref 65–99)
GLUCOSE SERPL-MCNC: 92 MG/DL (ref 65–140)
HCT VFR BLD AUTO: 33.8 % (ref 36.5–49.3)
HGB BLD-MCNC: 11.1 G/DL (ref 12–17)
IMM GRANULOCYTES # BLD AUTO: 0.01 THOUSAND/UL (ref 0–0.2)
IMM GRANULOCYTES NFR BLD AUTO: 0 % (ref 0–2)
INTERVENTRICULAR SEPTUM IN DIASTOLE (PARASTERNAL SHORT AXIS VIEW): 1.3 CM
INTERVENTRICULAR SEPTUM: 1.3 CM (ref 0.6–1.1)
LA/AORTA RATIO 2D: 0.72
LAAS-AP2: 23.1 CM2
LAAS-AP4: 22.5 CM2
LEFT ATRIUM SIZE: 2.8 CM
LEFT INTERNAL DIMENSION IN SYSTOLE: 3 CM (ref 2.1–4)
LEFT VENTRICULAR INTERNAL DIMENSION IN DIASTOLE: 4.4 CM (ref 3.5–6)
LEFT VENTRICULAR POSTERIOR WALL IN END DIASTOLE: 1.4 CM
LEFT VENTRICULAR STROKE VOLUME: 50 ML
LVSV (TEICH): 50 ML
LYMPHOCYTES # BLD AUTO: 1.99 THOUSANDS/ÂΜL (ref 0.6–4.47)
LYMPHOCYTES NFR BLD AUTO: 60 % (ref 14–44)
MAGNESIUM SERPL-MCNC: 2.4 MG/DL (ref 1.6–2.6)
MAX DIASTOLIC BP: 63 MMHG
MAX HEART RATE: 99 BPM
MAX HR: 99 BPM
MAX PREDICTED HEART RATE: 138 BPM
MAX. SYSTOLIC BP: 124 MMHG
MCH RBC QN AUTO: 28.6 PG (ref 26.8–34.3)
MCHC RBC AUTO-ENTMCNC: 32.8 G/DL (ref 31.4–37.4)
MCV RBC AUTO: 87 FL (ref 82–98)
MONOCYTES # BLD AUTO: 0.34 THOUSAND/ÂΜL (ref 0.17–1.22)
MONOCYTES NFR BLD AUTO: 10 % (ref 4–12)
MV PEAK E VEL: 38 CM/S
NEUTROPHILS # BLD AUTO: 0.92 THOUSANDS/ÂΜL (ref 1.85–7.62)
NEUTS SEG NFR BLD AUTO: 28 % (ref 43–75)
NRBC BLD AUTO-RTO: 0 /100 WBCS
NUC STRESS EJECTION FRACTION: 44 %
P AXIS: 37 DEGREES
P AXIS: 53 DEGREES
PLATELET # BLD AUTO: 120 THOUSANDS/UL (ref 149–390)
PMV BLD AUTO: 12.2 FL (ref 8.9–12.7)
POTASSIUM SERPL-SCNC: 4.1 MMOL/L (ref 3.5–5.3)
PR INTERVAL: 134 MS
PR INTERVAL: 192 MS
PROT SERPL-MCNC: 6.7 G/DL (ref 6.4–8.4)
PROTOCOL NAME: NORMAL
QRS AXIS: -34 DEGREES
QRS AXIS: -44 DEGREES
QRSD INTERVAL: 108 MS
QRSD INTERVAL: 110 MS
QT INTERVAL: 442 MS
QT INTERVAL: 490 MS
QTC INTERVAL: 452 MS
QTC INTERVAL: 536 MS
RATE PRESSURE PRODUCT: NORMAL
RBC # BLD AUTO: 3.88 MILLION/UL (ref 3.88–5.62)
REASON FOR TERMINATION: NORMAL
SL CV AV PEAK GRADIENT RETROGRADE: 69 MMHG
SL CV LV EF: 60
SL CV PED ECHO LEFT VENTRICLE DIASTOLIC VOLUME (MOD BIPLANE) 2D: 86 ML
SL CV PED ECHO LEFT VENTRICLE SYSTOLIC VOLUME (MOD BIPLANE) 2D: 36 ML
SL CV REST NUCLEAR ISOTOPE DOSE: 9 MCI
SL CV STRESS NUCLEAR ISOTOPE DOSE: 29.2 MCI
SL CV STRESS RECOVERY BP: NORMAL MMHG
SL CV STRESS RECOVERY HR: 76 BPM
SL CV STRESS RECOVERY O2 SAT: 99 %
SODIUM SERPL-SCNC: 137 MMOL/L (ref 135–147)
STRESS ANGINA INDEX: 0
STRESS BASELINE BP: NORMAL MMHG
STRESS BASELINE HR: 64 BPM
STRESS O2 SAT REST: 100 %
STRESS PEAK HR: 98 BPM
STRESS POST O2 SAT PEAK: 99 %
STRESS POST PEAK BP: 122 MMHG
STRESS ST DEPRESSION: 0 MM
STRESS/REST PERFUSION RATIO: 1.02
T WAVE AXIS: 7 DEGREES
T WAVE AXIS: 80 DEGREES
TARGET HR FORMULA: NORMAL
TEST INDICATION: NORMAL
TIME IN EXERCISE PHASE: NORMAL
TR MAX PG: 30 MMHG
TR PEAK VELOCITY: 2.8 M/S
TRICUSPID ANNULAR PLANE SYSTOLIC EXCURSION: 2.2 CM
TRICUSPID VALVE PEAK REGURGITATION VELOCITY: 2.76 M/S
TSH SERPL DL<=0.05 MIU/L-ACNC: 2.34 UIU/ML (ref 0.45–4.5)
VENTRICULAR RATE: 63 BPM
VENTRICULAR RATE: 72 BPM
WBC # BLD AUTO: 3.33 THOUSAND/UL (ref 4.31–10.16)

## 2022-10-28 RX ORDER — ACETAMINOPHEN 325 MG/1
650 TABLET ORAL ONCE
Status: COMPLETED | OUTPATIENT
Start: 2022-10-28 | End: 2022-10-28

## 2022-10-28 RX ADMIN — SACUBITRIL AND VALSARTAN 1 TABLET: 49; 51 TABLET, FILM COATED ORAL at 10:25

## 2022-10-28 RX ADMIN — AMIODARONE HYDROCHLORIDE 200 MG: 200 TABLET ORAL at 10:25

## 2022-10-28 RX ADMIN — PANTOPRAZOLE SODIUM 40 MG: 40 TABLET, DELAYED RELEASE ORAL at 06:10

## 2022-10-28 RX ADMIN — DIGOXIN 125 MCG: 125 TABLET ORAL at 10:25

## 2022-10-28 RX ADMIN — ACETAMINOPHEN 650 MG: 325 TABLET, FILM COATED ORAL at 01:56

## 2022-10-28 RX ADMIN — ASPIRIN 81 MG: 81 TABLET, COATED ORAL at 10:25

## 2022-10-28 RX ADMIN — REGADENOSON 0.4 MG: 0.08 INJECTION, SOLUTION INTRAVENOUS at 11:55

## 2022-10-28 RX ADMIN — SACUBITRIL AND VALSARTAN 1 TABLET: 49; 51 TABLET, FILM COATED ORAL at 17:19

## 2022-10-28 RX ADMIN — EMPAGLIFLOZIN 10 MG: 10 TABLET, FILM COATED ORAL at 10:25

## 2022-10-28 RX ADMIN — TAMSULOSIN HYDROCHLORIDE 0.4 MG: 0.4 CAPSULE ORAL at 17:18

## 2022-10-28 RX ADMIN — FINASTERIDE 5 MG: 5 TABLET, FILM COATED ORAL at 10:26

## 2022-10-28 RX ADMIN — APIXABAN 5 MG: 5 TABLET, FILM COATED ORAL at 17:18

## 2022-10-28 RX ADMIN — ATORVASTATIN CALCIUM 20 MG: 20 TABLET, FILM COATED ORAL at 20:10

## 2022-10-28 RX ADMIN — APIXABAN 5 MG: 5 TABLET, FILM COATED ORAL at 10:25

## 2022-10-28 RX ADMIN — B-COMPLEX W/ C & FOLIC ACID TAB 1 TABLET: TAB at 10:25

## 2022-10-28 NOTE — PROGRESS NOTES
[address] [facility]  Progress Note - Jimi Cervantes 1940, 80 y o  male MRN: [id number]  Unit/Bed#: -01 Encounter: [id number]  Primary Care Provider: OSKAR Marcos   Date and time admitted to hospital: 10/27/2022  4:36 AM    * Precordial pain  Assessment & Plan  Patient having chest pain for the past 2 weeks worsening  Described as pinching sensation radiating to neck associated with headache and mild tenderness to the area on the neck and area of pacemaker    Lab Results   Component Value Date    HSTNI0 12 10/27/2022    HSTNI2 10 10/27/2022    HSTNID2 -2 10/27/2022     Lab Results   Component Value Date    NTBNP 151 10/27/2022     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  Cardiology brought on board for further workup  · Appreciate recommendations  · Monitor on telemetry  · Pharmacologic stress test today-pending results    Chronic diastolic congestive heart failure ([address] )  Assessment & Plan  Wt Readings from Last 3 Encounters:   10/28/22 69 4 kg (153 lb)   10/26/22 72 2 kg (159 lb 3 2 oz)   09/14/22 74 4 kg (164 lb)     Results for orders placed during the hospital encounter of 03/24/22    Echo complete w/ contrast if indicated    Interpretation Summary  •  Left Ventricle: Left ventricular cavity size is normal  Wall thickness is normal  Systolic function is normal (55%)  Wall motion is normal  Diastolic function is mildly abnormal, consistent with grade I (abnormal) relaxation  •  Right Ventricle: Right ventricular cavity size is dilated  Systolic function is normal  An ICD lead is present  •  Right Atrium: The atrium is mildly dilated  An ICD lead is present  •  Aortic Valve: There is mild to moderate regurgitation  •  Mitral Valve: There is mild annular calcification  There is mild regurgitation  •  Tricuspid Valve: There is moderate regurgitation  The right ventricular systolic pressure is normal   •  Pulmonic Valve:  There is mild regurgitation  •  Aorta: 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)  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)  Not in acute exac   · Continue enestro  · SGLT 2-empagliflozin as dapagliflozin is not on formulary  · Monitor I/O, daily weight  · Does not appear to be in exacerbation at this time  · Cardiology on board-appreciate recommendations      Anemia in other chronic diseases classified elsewhere  Assessment & Plan  Recent Labs     10/26/22  1107 10/27/22  0446 10/28/22  0611   HGB 13 7 12 5 11 1*   MCV 90 90 87       Unclear etiology-will monitor  No reports hemoptysis or bloody stools    Plan:  • Monitor CBC tomorrow a m   • Transfuse if Hb < 7    AICD (automatic cardioverter/defibrillator) present  Assessment & Plan  Has an ICD in place  EKG showing PVCs with incomplete RBBB  Getting regular device checks  Cardiology on board should the patient require additional interrogation of device    Mixed hyperlipidemia  Assessment & Plan  · Continue statin    Paroxysmal A-fib ([address] )  Assessment & Plan  · On Admission EKG - NSR; paced  · continue amiodarone, digoxin, eliquis     Atherosclerosis of native coronary artery of native heart without angina pectoris  Assessment & Plan  Continue home meds        VTE Pharmacologic Prophylaxis: VTE Score: 3 Moderate Risk (Score 3-4) - Pharmacological DVT Prophylaxis Ordered: apixaban (Eliquis)  Patient Centered Rounds: I performed bedside rounds with nursing staff today  Discussions with Specialists or Other Care Team Provider:  Cardiology    Education and Discussions with Family / Patient: Attempted to update  (daughter) via phone  Unable to contact  Time Spent for Care: 45 minutes  More than 50% of total time spent on counseling and coordination of care as described above      Current Length of Stay: 0 day(s)  Current Patient Status: Inpatient   Certification Statement: The patient will continue to require additional inpatient hospital stay due to Management of anemia in follow-up of pharmacologic stress test  Discharge Plan: Anticipate discharge tomorrow to home  Code Status: Level 1 - Full Code    Subjective: Today, patient states he has no new complaints  Denies any abdominal pain, nausea, vomiting, constipation, diarrhea  Objective:     Vitals:   Temp (24hrs), Av 2 °F (36 8 °C), Min:98 1 °F (36 7 °C), Max:98 4 °F (36 9 °C)    Temp:  [98 1 °F (36 7 °C)-98 4 °F (36 9 °C)] 98 2 °F (36 8 °C)  HR:  [62-73] 73  Resp:  [15-18] 18  BP: (103-129)/(57-73) 129/64  SpO2:  [95 %-100 %] 99 %  Body mass index is 19 64 kg/m²  Input and Output Summary (last 24 hours): Intake/Output Summary (Last 24 hours) at 10/28/2022 1609  Last data filed at 10/28/2022 0900  Gross per 24 hour   Intake 540 ml   Output --   Net 540 ml       Physical Exam:   Physical Exam  Vitals and nursing note reviewed  Constitutional:       General: He is not in acute distress  Appearance: He is well-developed  He is not diaphoretic  HENT:      Head: Normocephalic and atraumatic  Nose: Nose normal    Eyes:      General: No scleral icterus  Conjunctiva/sclera: Conjunctivae normal       Pupils: Pupils are equal, round, and reactive to light  Neck:      Thyroid: No thyromegaly  Cardiovascular:      Rate and Rhythm: Normal rate and regular rhythm  Pulses:           Radial pulses are 2+ on the right side and 2+ on the left side  Heart sounds: Normal heart sounds  No murmur heard  No friction rub  No gallop  Pulmonary:      Effort: Pulmonary effort is normal  No respiratory distress  Breath sounds: Normal breath sounds  No stridor  No wheezing or rales  Chest:      Chest wall: No deformity (Pacemaker) or tenderness  Comments: No rashes, or redness noted around pacemaker site  Abdominal:      General: Bowel sounds are normal  There is no distension  Palpations: Abdomen is soft  There is no mass  Tenderness: There is no abdominal tenderness  Musculoskeletal:         General: No deformity  Normal range of motion  Cervical back: Normal range of motion and neck supple  Right lower leg: No edema  Left lower leg: No edema  Skin:     General: Skin is warm and dry  Neurological:      Mental Status: He is alert and oriented to person, place, and time  Psychiatric:         Behavior: Behavior normal          Thought Content: Thought content normal          Judgment: Judgment normal           Additional Data:     Labs:  Results from last 7 days   Lab Units 10/28/22  0611   WBC Thousand/uL 3 33*   HEMOGLOBIN g/dL 11 1*   HEMATOCRIT % 33 8*   PLATELETS Thousands/uL 120*   NEUTROS PCT % 28*   LYMPHS PCT % 60*   MONOS PCT % 10   EOS PCT % 1     Results from last 7 days   Lab Units 10/28/22  0611   SODIUM mmol/L 137   POTASSIUM mmol/L 4 1   CHLORIDE mmol/L 104   CO2 mmol/L 27   BUN mg/dL 12   CREATININE mg/dL 1 08   ANION GAP mmol/L 6   CALCIUM mg/dL 8 3   ALBUMIN g/dL 3 0*   TOTAL BILIRUBIN mg/dL 0 93   ALK PHOS U/L 49   ALT U/L 26   AST U/L 30   GLUCOSE RANDOM mg/dL 92     Results from last 7 days   Lab Units 10/27/22  0446   INR  1 38*         Results from last 7 days   Lab Units 10/26/22  1107   HEMOGLOBIN A1C % 5 0           Lines/Drains:  Invasive Devices  Report    Peripheral Intravenous Line  Duration           Peripheral IV 05/06/22 Left Antecubital 175 days    Peripheral IV 10/27/22 Left;Proximal;Ventral (anterior) Forearm 1 day                      Imaging: Reviewed radiology reports from this admission including: procedure reports    Recent Cultures (last 7 days):   Results from last 7 days   Lab Units 10/27/22  1740   BLOOD CULTURE  Received in Microbiology Lab  Culture in Progress  Received in Microbiology Lab  Culture in Progress         Last 24 Hours Medication List:   Current Facility-Administered Medications   Medication Dose Route Frequency Provider Last Rate   • amiodarone  200 mg Oral Daily Jas Allen DO     • apixaban  5 mg Oral BID [facility]     • aspirin  81 mg Oral Daily [facility], DO     • atorvastatin  20 mg Oral HS [facility], DO     • digoxin  125 mcg Oral Daily [facility], DO     • Empagliflozin  10 mg Oral Daily [facility], DO     • finasteride  5 mg Oral Daily [facility], DO     • multivitamin stress formula  1 tablet Oral Daily [facility], DO     • ondansetron  4 mg Oral Q6H PRN [facility], DO     • pantoprazole  40 mg Oral Early Morning [facility], DO     • sacubitril-valsartan  1 tablet Oral BID [facility], DO     • tamsulosin  0 4 mg Oral Daily With Dinner Fadi Warren, DO          Today, Patient Was Seen By: Fadi Warren    **Please Note: This note may have been constructed using a voice recognition system  **

## 2022-10-28 NOTE — UTILIZATION REVIEW
Initial Clinical Review    Admission Orders (From admission, onward)     Ordered        10/28/22 1533  Inpatient Admission  Once            10/27/22 1120  Place in Observation  Once                      Inpatient Admission  Once        Transfer Service: Hospitalist       Question Answer   Level of Care Med Surg   Estimated length of stay More than 2 Midnights   Certification I certify that inpatient services are medically necessary for this patient for a duration of greater than two midnights  See H&P and MD Progress Notes for additional information about the patient's course of treatment  ED Arrival Information     Expected   -    Arrival   10/27/2022 04:25    Acuity   Emergent            Means of arrival   Walk-In    Escorted by   Family Member    Service   Hospitalist    Admission type   Emergency            Arrival complaint   CHEST PAIN           Chief Complaint   Patient presents with   • Chest Pain     Pt arrived via wheelchair with c/o cp around defibrillator  Numbness and tingling  Initial Presentation: 80 y o  male  To ER from home  PMHx significant for AFib on Eliquis, HTN, CAD status post MI in 2013, ICD in place  Patient reports  symptoms started 9 days ago  Initially mild, more or less persistent,  Progressively worsening over past 2  Days  daughter is at bedside  She notes ICD generator is more mobile, and states that it seems to have shifted more laterally over the past few mo  Denied any ICD shocks and has regular   interrogations of the pacemaker  Examines euvolemic  Due to significant cardiac history and burning chest pain,  Admitted OBS Status,  MS Level of care for workup of precordial pain,   R/o ACS  Cont telemetry,  Trend troponins, consult cardiology   Continue Enestro,amiodarone, digoxin, eliquis  Obtain ECHO    Arrange interogation       Date:  10/28      Day 2:   CHANGED To INPATIENT  STATUS,  Ms level of care   For ongoing workup of chest pain,  R/o ACS       10/28   Stress test     ED Triage Vitals   Temperature Pulse Respirations Blood Pressure SpO2   10/27/22 0437 10/27/22 0433 10/27/22 0433 10/27/22 0433 10/27/22 0433   98 °F (36 7 °C) 83 18 137/66 97 %      Temp Source Heart Rate Source Patient Position - Orthostatic VS BP Location FiO2 (%)   10/27/22 0437 10/27/22 0433 10/27/22 0433 10/27/22 0433 --   Oral Monitor Sitting Right arm       Pain Score       10/27/22 0629       8          Wt Readings from Last 1 Encounters:   10/28/22 69 4 kg (153 lb)     Additional Vital Signs:    10/28/22 11:04:27 -- 73 -- 129/64   10/28/22 1015 -- 70 -- 103/64   10/28/22 07:22:47 98 2 °F (36 8 °C) 62 18 103/64   10/27/22 23:46:31 98 1 °F (36 7 °C) 66 15 121/73   10/27/22 17:08:05 98 4 °F (36 9 °C) 66 -- --   10/27/22 1644 -- 69 18 113/57   10/27/22 1513 -- 71 20 106/59   10/27/22 1100 -- 66 21 111/59   10/27/22 1000 -- 77 18 135/68   10/27/22 0900 -- 65 12 114/57   10/27/22 0830 -- 64 13 99/60   10/27/22 0730 -- 67 16 122/62   10/27/22 0630 -- 71 22 96/57   10/27/22 0600 -- 68 12 108/57       Pertinent Labs/Diagnostic Test Results:     10/27  EKG -  Normal sinus rhythm,  showing PVCs with incomplete RBBB      CT chest wo contrast   Final Result by Deangelo Chan MD (10/27 3005)   Left-sided single-lead AICD in place  Evaluation of the soft tissues immediately surrounding the generator is limited by streak artifact  However, no definite fluid collections are seen and it is unchanged in appearance compared to May 2022  No acute pulmonary pathology  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  Follow-up recommended in 12 months            XR chest 1 view portable   Final Result by Kirstin Hoskins MD (10/27 1838)   No acute pulmonary disease            Results from last 7 days   Lab Units 10/28/22  0611 10/27/22  0446 10/26/22  1107   WBC Thousand/uL 3 33* 4 54 3 64*   HEMOGLOBIN g/dL 11 1* 12 5 13 7   HEMATOCRIT % 33 8* 39 0 43 0   PLATELETS Thousands/uL 120* 127* 126*   NEUTROS ABS Thousands/µL 0 92* 1 61*  --          Results from last 7 days   Lab Units 10/28/22  0611 10/27/22  0446 10/26/22  1107   SODIUM mmol/L 137 139 139   POTASSIUM mmol/L 4 1 3 7 4 7   CHLORIDE mmol/L 104 102 102   CO2 mmol/L 27 27 26   ANION GAP mmol/L 6 10 11   BUN mg/dL 12 13 12   CREATININE mg/dL 1 08 1 25 1 26   EGFR ml/min/1 73sq m 63 53 52   CALCIUM mg/dL 8 3 8 8 9 0   MAGNESIUM mg/dL 2 4  --  2 2     Results from last 7 days   Lab Units 10/28/22  0611 10/27/22  0446 10/26/22  1107   AST U/L 30 24 30   ALT U/L 26 30 31   ALK PHOS U/L 49 56 54   TOTAL PROTEIN g/dL 6 7 8 2 8 4   ALBUMIN g/dL 3 0* 3 8 3 7   TOTAL BILIRUBIN mg/dL 0 93 1 13* 1 32*         Results from last 7 days   Lab Units 10/28/22  0611 10/27/22  0446   GLUCOSE RANDOM mg/dL 92 107         Results from last 7 days   Lab Units 10/26/22  1107   HEMOGLOBIN A1C % 5 0   EAG mg/dl 97     Results from last 7 days   Lab Units 10/27/22  0640 10/27/22  0446   HS TNI 0HR ng/L  --  12   HS TNI 2HR ng/L 10  --    HSTNI D2 ng/L -2  --          Results from last 7 days   Lab Units 10/27/22  0446   PROTIME seconds 16 7*   INR  1 38*   PTT seconds 31     Results from last 7 days   Lab Units 10/28/22  0611   TSH 3RD GENERATON uIU/mL 2 338     Results from last 7 days   Lab Units 10/27/22  1740   DIGOXIN LVL ng/mL 1 0     Results from last 7 days   Lab Units 10/27/22  0446   NT-PRO BNP pg/mL 151     Results from last 7 days   Lab Units 10/27/22  1740   BLOOD CULTURE  Received in Microbiology Lab  Culture in Progress  Received in Microbiology Lab  Culture in Progress                 ED Treatment:   Medication Administration from 10/27/2022 0425 to 10/27/2022 1650       Date/Time Order Dose Route Action     10/27/2022 0629 acetaminophen (TYLENOL) tablet 650 mg 650 mg Oral Given     10/27/2022 0629 magnesium sulfate 2 g/50 mL IVPB (premix) 2 g 2 g Intravenous New Bag        Past Medical History:   Diagnosis Date   • CHF (congestive heart failure) [facility])    • Hyperlipidemia    • Hypertension    • MI (myocardial infarction) ([facility] [address] )    • Presence of cardiac defibrillator    • Prostate disorder      Present on Admission:  • Mixed hyperlipidemia  • Paroxysmal A-fib (HCC)  • Atherosclerosis of native coronary artery of native heart without angina pectoris  • AICD (automatic cardioverter/defibrillator) present  • Chronic diastolic congestive heart failure (HCC)      Admitting Diagnosis: Chest pain, unspecified [R07 9]  Chest pain [R07 9]  Abnormal ECG [R94 31]  AICD (automatic cardioverter/defibrillator) present [Z95 810]  Age/Sex: 80 y o  male     Admission Orders:     See above note  Monitor I/O, daily weight  Diet - 2 gm Na, no chocolate, no caffeine  Scheduled Medications:  amiodarone, 200 mg, Oral, Daily  apixaban, 5 mg, Oral, BID  aspirin, 81 mg, Oral, Daily  atorvastatin, 20 mg, Oral, HS  digoxin, 125 mcg, Oral, Daily  Empagliflozin, 10 mg, Oral, Daily  finasteride, 5 mg, Oral, Daily  multivitamin stress formula, 1 tablet, Oral, Daily  pantoprazole, 40 mg, Oral, Early Morning  sacubitril-valsartan, 1 tablet, Oral, BID  tamsulosin, 0 4 mg, Oral, Daily With Dinner      Continuous IV Infusions:     PRN Meds:  ondansetron, 4 mg, Oral, Q6H PRN        IP CONSULT TO CARDIOLOGY    Network Utilization Review Department  ATTENTION: Please call with any questions or concerns to [phone] and carefully listen to the prompts so that you are directed to the right person  All voicemails are confidential   Sumit Vazquez all requests for admission clinical reviews, approved or denied determinations and any other requests to dedicated fax number below belonging to the campus where the patient is receiving treatment   List of dedicated fax numbers for the Facilities:  [address] DENIALS (Administrative/Medical Necessity) [phone]   [address] (Maternity/NICU/Pediatrics) [phone] 916 Amy Spencer [phone] Radha Tilley 77 [phone]   [address] [place] [address] [facility]  [facility] 310 [facility] 134 815 [facility] [phone]

## 2022-10-28 NOTE — ASSESSMENT & PLAN NOTE
Wt Readings from Last 3 Encounters:   10/28/22 69 4 kg (153 lb)   10/26/22 72 2 kg (159 lb 3 2 oz)   09/14/22 74 4 kg (164 lb)     Results for orders placed during the hospital encounter of 03/24/22    Echo complete w/ contrast if indicated    Interpretation Summary  •  Left Ventricle: Left ventricular cavity size is normal  Wall thickness is normal  Systolic function is normal (55%)  Wall motion is normal  Diastolic function is mildly abnormal, consistent with grade I (abnormal) relaxation  •  Right Ventricle: Right ventricular cavity size is dilated  Systolic function is normal  An ICD lead is present  •  Right Atrium: The atrium is mildly dilated  An ICD lead is present  •  Aortic Valve: There is mild to moderate regurgitation  •  Mitral Valve: There is mild annular calcification  There is mild regurgitation  •  Tricuspid Valve: There is moderate regurgitation  The right ventricular systolic pressure is normal   •  Pulmonic Valve: There is mild regurgitation  •  Aorta: 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

## 2022-10-28 NOTE — ASSESSMENT & PLAN NOTE
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

## 2022-10-28 NOTE — PROGRESS NOTES
Cardiology Progress note  Unit/Bed#: -01 Encounter: [id number]        Alex Cervantes 80 y o  male [id number]  Hospital Stay Days: 0    Assessment and Plan      Current Problem List   Principal Problem:    Precordial pain  Active Problems:    Atherosclerosis of native coronary artery of native heart without angina pectoris    Paroxysmal A-fib (HCC)    Mixed hyperlipidemia    AICD (automatic cardioverter/defibrillator) present    Chronic diastolic congestive heart failure (HCC)    Assessment/Plan:       1  Chest Pain  Patient endorses chest pain for the past two weeks directly around his ICD pocket  Patient states it feels like a pinching sensation and sometimes travels up his neck and he gets a headache  The pain is not associated with anything, but he the area is tender  ? MI in  in [place] requiring stent and ICD placement, with device checks as scheduled   ? Trop - x2, BNP -  ? CXR -  ? EKG-PVC's Incomplete RBBB, non-specific T wave changes  ? Blood cultures ordered  ? Device check   ? Digoxin, Entresto, Amio  ? Pharm stress test  ? EP outpatient follow up  2  Hypertension  BP /57-66  ? Currently controlled on Entresto      Subjective     Patient states still has pain around site    Objective     Vitals: Temp (24hrs), Av 2 °F (36 8 °C), Min:98 1 °F (36 7 °C), Max:98 4 °F (36 9 °C)  Current: Temperature: 98 2 °F (36 8 °C)  Patient Vitals for the past 24 hrs:   BP Temp Pulse Resp SpO2 Weight   10/28/22 0722 103/64 98 2 °F (36 8 °C) 62 18 100 % --   10/28/22 0500 -- -- -- -- -- 69 5 kg (153 lb 3 5 oz)   10/27/22 2346 121/73 98 1 °F (36 7 °C) 66 15 99 % --   10/27/22 1708 -- 98 4 °F (36 9 °C) 66 -- 100 % --   10/27/22 1644 113/57 -- 69 18 95 % --   10/27/22 1513 106/59 -- 71 20 100 % --   10/27/22 1100 111/59 -- 66 21 99 % --   10/27/22 1000 135/68 -- 77 18 93 % --   10/27/22 0900 114/57 -- 65 12 100 % --   10/27/22 0830 99/60 -- 64 13 99 % --    Body mass index is 19 67 kg/m²  Physical Exam:  Physical Exam  Vitals and nursing note reviewed  Constitutional:       Appearance: He is well-developed  HENT:      Head: Normocephalic and atraumatic  Eyes:      Conjunctiva/sclera: Conjunctivae normal    Cardiovascular:      Rate and Rhythm: Normal rate and regular rhythm  Heart sounds: No murmur heard  Pulmonary:      Effort: Pulmonary effort is normal  No respiratory distress  Breath sounds: Normal breath sounds  Abdominal:      Palpations: Abdomen is soft  Tenderness: There is no abdominal tenderness  Musculoskeletal:      Cervical back: Neck supple  Right lower leg: No edema  Left lower leg: No edema  Skin:     General: Skin is warm and dry  Neurological:      Mental Status: He is alert and oriented to person, place, and time  Invasive Devices  Report    Peripheral Intravenous Line  Duration           Peripheral IV 05/06/22 Left Antecubital 174 days    Peripheral IV 10/27/22 Left;Proximal;Ventral (anterior) Forearm 1 day                    Labs:   Results from last 7 days   Lab Units 10/28/22  0611 10/27/22  0446 10/27/22  0446 10/26/22  1107   WBC Thousand/uL 3 33*  --  4 54 3 64*   HEMOGLOBIN g/dL 11 1*  --  12 5 13 7   HEMATOCRIT % 33 8*  --  39 0 43 0   PLATELETS Thousands/uL 120*  --  127* 126*   NEUTROS PCT % 28*   < > 36*  --    MONOS PCT % 10  --  8  --     < > = values in this interval not displayed        Results from last 7 days   Lab Units 10/28/22  0611 10/27/22  0446 10/26/22  1107   SODIUM mmol/L 137 139 139   POTASSIUM mmol/L 4 1 3 7 4 7   CHLORIDE mmol/L 104 102 102   CO2 mmol/L 27 27 26   BUN mg/dL 12 13 12   CREATININE mg/dL 1 08 1 25 1 26   CALCIUM mg/dL 8 3 8 8 9 0   ALK PHOS U/L 49 56 54   ALT U/L 26 30 31   AST U/L 30 24 30   MAGNESIUM mg/dL 2 4  --  2 2   INR   --  1 38*  --    PTT seconds  --  31  --    EGFR ml/min/1 73sq m 63 53 52     Results from last 7 days   Lab Units 10/27/22  0446   INR  1 38*   PTT seconds 31             No results found for: PHART, SJD0URC, PO2ART, JLX4NNN, T1UELLHK, BEART, SOURCE  No components found for: HIV1X2  No results found for: HAV, HEPAIGM, HEPBIGM, HEPBCAB, HBEAG, HEPCAB  No results found for: SPEP, UPEP   Lab Results   Component Value Date    HGBA1C 5 0 10/26/2022    HGBA1C 5 0 09/07/2021     No results found for: CHOL   Lab Results   Component Value Date    HDL 67 10/26/2022    HDL 61 09/07/2021      Lab Results   Component Value Date    LDLCALC 50 10/26/2022    LDLCALC 64 09/07/2021      Lab Results   Component Value Date    TRIG 75 10/26/2022    TRIG 74 09/07/2021     No components found for: PROCAL      Micro:  Results from last 7 days   Lab Units 10/27/22  1740   BLOOD CULTURE  Received in Microbiology Lab  Culture in Progress  Received in Microbiology Lab  Culture in Progress  Urinalysis:  No results found for: AMPHETUR, BDZUR, COCAINEUR, OPIATEUR, PCPUR, THCUR, ETOH, ACTMNPHEN, SALICYLATE       Invalid input(s): URIBILINOGEN        Intake and Outputs:  I/O       10/26 0701  10/27 0700 10/27 0701  10/28 0700 10/28 0701  10/29 0700    P  O   240     IV Piggyback  50     Total Intake(mL/kg)  290 (4 2)     Net  +290            Unmeasured Urine Occurrence  1 x         Nutrition:  Diet Cardiovascular; Sodium 2 GM  Radiology Results:   CT chest wo contrast   Final Result by Clyde Benavides MD (10/27 6079)      Left-sided single-lead AICD in place  Evaluation of the soft tissues immediately surrounding the generator is limited by streak artifact  However, no definite fluid collections are seen and it is unchanged in appearance compared to May 2022  No acute pulmonary pathology  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  Follow-up recommended in 12 months               Workstation performed: [id number]         XR chest 1 view portable   Final Result by Rachid Pederson MD (10/27 2651)      No acute pulmonary disease                  Workstation performed: [id number]           Scheduled Medications:  amiodarone, 200 mg, Daily  apixaban, 5 mg, BID  aspirin, 81 mg, Daily  atorvastatin, 20 mg, HS  digoxin, 125 mcg, Daily  Empagliflozin, 10 mg, Daily  finasteride, 5 mg, Daily  multivitamin stress formula, 1 tablet, Daily  pantoprazole, 40 mg, Early Morning  sacubitril-valsartan, 1 tablet, BID  tamsulosin, 0 4 mg, Daily With Dinner      PRN MEDS:  ondansetron, 4 mg, Q6H PRN      Last 24 Hour Meds: :   Medication Administration - last 24 hours from 10/27/2022 0813 to 10/28/2022 0813       Date/Time Order Dose Route Action Action by     10/27/2022 1929 apixaban (ELIQUIS) tablet 5 mg 5 mg Oral Given Lizzie Mccrary, RN     10/27/2022 2203 atorvastatin (LIPITOR) tablet 20 mg 20 mg Oral Given Lizzieelliott Mccrary, RN     10/28/2022 0610 pantoprazole (PROTONIX) EC tablet 40 mg 40 mg Oral Given Lizzieelliott Mccrary, RN     10/27/2022 1929 ondansetron (ZOFRAN-ODT) dispersible tablet 4 mg 4 mg Oral Given Lizzieelliott CliffordDemetra, RN     10/27/2022 1929 sacubitril-valsartan (ENTRESTO) 49-51 MG per tablet 1 tablet 1 tablet Oral Given Lizzieelliott CliffordDemetra, RN     10/27/2022 1930 tamsulosin (FLOMAX) capsule 0 4 mg 0 4 mg Oral Given Lizzie Mccrary, RN     10/28/2022 4924 acetaminophen (TYLENOL) tablet 650 mg 650 mg Oral Given Lizzie Mccrary, RN              [address] [facility],[address]  Internal Medicine Residency PGY-1    PLEASE NOTE:  This encounter was completed utilizing the [employer] Direct Speech Voice Recognition Software  Grammatical errors, random word insertions, pronoun errors and incomplete sentences are occasional consequences of the system due to software limitations, ambient noise and hardware issues  These may be missed by proof reading prior to affixing electronic signature  Any questions or concerns about the content, text or information contained within the body of this dictation should be directly addressed to the physician for clarification  Please do not hesitate to call me directly if you have any any questions or concerns

## 2022-10-28 NOTE — PLAN OF CARE
Problem: Knowledge Deficit  Goal: Patient/family/caregiver demonstrates understanding of disease process, treatment plan, medications, and discharge instructions  Description: Complete learning assessment and assess knowledge base    Interventions:  - Provide teaching at level of understanding  - Provide teaching via preferred learning methods  10/28/2022 1306 by Laurel Newell RN  Outcome: Progressing  10/28/2022 1305 by Laurel Newell RN  Outcome: Progressing

## 2022-10-28 NOTE — ASSESSMENT & PLAN NOTE
Recent Labs     10/26/22  1107 10/27/22  0446 10/28/22  0611   HGB 13 7 12 5 11 1*   MCV 90 90 87       Unclear etiology-will monitor  No reports hemoptysis or bloody stools    Plan:  • Monitor CBC tomorrow a m   • Transfuse if Hb < 7

## 2022-10-28 NOTE — PLAN OF CARE
Problem: INFECTION - ADULT  Goal: Absence or prevention of progression during hospitalization  Description: INTERVENTIONS:  - Assess and monitor for signs and symptoms of infection  - Monitor lab/diagnostic results  - Monitor all insertion sites, i e  indwelling lines, tubes, and drains  - Monitor endotracheal if appropriate and nasal secretions for changes in amount and color  - [place] appropriate cooling/warming therapies per order  - Administer medications as ordered  - Instruct and encourage patient and family to use good hand hygiene technique  - Identify and instruct in appropriate isolation precautions for identified infection/condition  Outcome: Progressing  Goal: Absence of fever/infection during neutropenic period  Description: INTERVENTIONS:  - Monitor WBC    Outcome: Progressing

## 2022-10-28 NOTE — PLAN OF CARE
Problem: PAIN - ADULT  Goal: Verbalizes/displays adequate comfort level or baseline comfort level  Description: Interventions:  - Encourage patient to monitor pain and request assistance  - Assess pain using appropriate pain scale  - Administer analgesics based on type and severity of pain and evaluate response  - Implement non-pharmacological measures as appropriate and evaluate response  - Consider cultural and social influences on pain and pain management  - Notify physician/advanced practitioner if interventions unsuccessful or patient reports new pain  Outcome: Progressing  Goal: Ability to express needs and understand communication  Outcome: Progressing     Problem: INFECTION - ADULT  Goal: Absence or prevention of progression during hospitalization  Description: INTERVENTIONS:  - Assess and monitor for signs and symptoms of infection  - Monitor lab/diagnostic results  - Monitor all insertion sites, i e  indwelling lines, tubes, and drains  - Monitor endotracheal if appropriate and nasal secretions for changes in amount and color  - [place] appropriate cooling/warming therapies per order  - Administer medications as ordered  - Instruct and encourage patient and family to use good hand hygiene technique  - Identify and instruct in appropriate isolation precautions for identified infection/condition  Outcome: Progressing  Goal: Absence of fever/infection during neutropenic period  Description: INTERVENTIONS:  - Monitor WBC    Outcome: Progressing     Problem: SAFETY ADULT  Goal: Patient will remain free of falls  Description: INTERVENTIONS:  - Educate patient/family on patient safety including physical limitations  - Instruct patient to call for assistance with activity   - Consult OT/PT to assist with strengthening/mobility   - Keep Call bell within reach  - Keep bed low and locked with side rails adjusted as appropriate  - Keep care items and personal belongings within reach  - Initiate and maintain comfort rounds  - Make Fall Risk Sign visible to staff  - Offer Toileting every 2 Hours, in advance of need  - Initiate/Maintain alarm  - Obtain necessary fall risk management equipment:   - Apply yellow socks and bracelet for high fall risk patients  - Consider moving patient to room near nurses station  Outcome: Progressing  Goal: Maintain or return to baseline ADL function  Description: INTERVENTIONS:  -  Assess patient's ability to carry out ADLs; assess patient's baseline for ADL function and identify physical deficits which impact ability to perform ADLs (bathing, care of mouth/teeth, toileting, grooming, dressing, etc )  - Assess/evaluate cause of self-care deficits   - Assess range of motion  - Assess patient's mobility; develop plan if impaired  - Assess patient's need for assistive devices and provide as appropriate  - Encourage maximum independence but intervene and supervise when necessary  - Involve family in performance of ADLs  - Assess for home care needs following discharge   - Consider OT consult to assist with ADL evaluation and planning for discharge  - Provide patient education as appropriate  Outcome: Progressing  Goal: Maintains/Returns to pre admission functional level  Description: INTERVENTIONS:  - Perform BMAT or MOVE assessment daily    - Set and communicate daily mobility goal to care team and patient/family/caregiver  - Collaborate with rehabilitation services on mobility goals if consulted  - Perform Range of Motion 2 times a day  - Reposition patient every 2 hours    - Dangle patient 2 times a day  - Stand patient 2 times a day  - Ambulate patient 2 times a day  - Out of bed to chair 2 times a day   - Out of bed for meals 2 times a day  - Out of bed for toileting  - Record patient progress and toleration of activity level   Outcome: Progressing     Problem: DISCHARGE PLANNING  Goal: Discharge to home or other facility with appropriate resources  Description: INTERVENTIONS:  - Identify barriers to discharge w/patient and caregiver  - Arrange for needed discharge resources and transportation as appropriate  - Identify discharge learning needs (meds, wound care, etc )  - Arrange for interpretive services to assist at discharge as needed  - Refer to Case Management Department for coordinating discharge planning if the patient needs post-hospital services based on physician/advanced practitioner order or complex needs related to functional status, cognitive ability, or social support system  Outcome: Progressing     Problem: Knowledge Deficit  Goal: Patient/family/caregiver demonstrates understanding of disease process, treatment plan, medications, and discharge instructions  Description: Complete learning assessment and assess knowledge base  Interventions:  - Provide teaching at level of understanding  - Provide teaching via preferred learning methods  Outcome: Progressing     Problem: Nutrition/Hydration-ADULT  Goal: Nutrient/Hydration intake appropriate for improving, restoring or maintaining nutritional needs  Description: Monitor and assess patient's nutrition/hydration status for malnutrition  Collaborate with interdisciplinary team and initiate plan and interventions as ordered  Monitor patient's weight and dietary intake as ordered or per policy  Utilize nutrition screening tool and intervene as necessary  Determine patient's food preferences and provide high-protein, high-caloric foods as appropriate       INTERVENTIONS:  - Monitor oral intake, urinary output, labs, and treatment plans  - Assess nutrition and hydration status and recommend course of action  - Evaluate amount of meals eaten  - Assist patient with eating if necessary   - Allow adequate time for meals  - Recommend/ encourage appropriate diets, oral nutritional supplements, and vitamin/mineral supplements  - Order, calculate, and assess calorie counts as needed  - Recommend, monitor, and adjust tube feedings and TPN/PPN based on assessed needs  - Assess need for intravenous fluids  - Provide specific nutrition/hydration education as appropriate  - Include patient/family/caregiver in decisions related to nutrition  Outcome: Progressing

## 2022-10-29 VITALS
HEART RATE: 70 BPM | WEIGHT: 152.12 LBS | DIASTOLIC BLOOD PRESSURE: 57 MMHG | HEIGHT: 74 IN | BODY MASS INDEX: 19.52 KG/M2 | OXYGEN SATURATION: 99 % | RESPIRATION RATE: 18 BRPM | SYSTOLIC BLOOD PRESSURE: 101 MMHG | TEMPERATURE: 98 F

## 2022-10-29 LAB
BASOPHILS # BLD AUTO: 0.01 THOUSANDS/ÂΜL (ref 0–0.1)
BASOPHILS NFR BLD AUTO: 0 % (ref 0–1)
EOSINOPHIL # BLD AUTO: 0.04 THOUSAND/ÂΜL (ref 0–0.61)
EOSINOPHIL NFR BLD AUTO: 1 % (ref 0–6)
ERYTHROCYTE [DISTWIDTH] IN BLOOD BY AUTOMATED COUNT: 15.1 % (ref 11.6–15.1)
HCT VFR BLD AUTO: 35.1 % (ref 36.5–49.3)
HGB BLD-MCNC: 11.2 G/DL (ref 12–17)
IMM GRANULOCYTES # BLD AUTO: 0.01 THOUSAND/UL (ref 0–0.2)
IMM GRANULOCYTES NFR BLD AUTO: 0 % (ref 0–2)
LYMPHOCYTES # BLD AUTO: 1.95 THOUSANDS/ÂΜL (ref 0.6–4.47)
LYMPHOCYTES NFR BLD AUTO: 53 % (ref 14–44)
MCH RBC QN AUTO: 28.5 PG (ref 26.8–34.3)
MCHC RBC AUTO-ENTMCNC: 31.9 G/DL (ref 31.4–37.4)
MCV RBC AUTO: 89 FL (ref 82–98)
MONOCYTES # BLD AUTO: 0.35 THOUSAND/ÂΜL (ref 0.17–1.22)
MONOCYTES NFR BLD AUTO: 9 % (ref 4–12)
NEUTROPHILS # BLD AUTO: 1.42 THOUSANDS/ÂΜL (ref 1.85–7.62)
NEUTS SEG NFR BLD AUTO: 37 % (ref 43–75)
NRBC BLD AUTO-RTO: 0 /100 WBCS
PLATELET # BLD AUTO: 128 THOUSANDS/UL (ref 149–390)
PMV BLD AUTO: 12.4 FL (ref 8.9–12.7)
RBC # BLD AUTO: 3.93 MILLION/UL (ref 3.88–5.62)
WBC # BLD AUTO: 3.78 THOUSAND/UL (ref 4.31–10.16)

## 2022-10-29 RX ORDER — ACETAMINOPHEN 325 MG/1
650 TABLET ORAL EVERY 6 HOURS PRN
Status: DISCONTINUED | OUTPATIENT
Start: 2022-10-29 | End: 2022-10-29 | Stop reason: HOSPADM

## 2022-10-29 RX ADMIN — B-COMPLEX W/ C & FOLIC ACID TAB 1 TABLET: TAB at 09:33

## 2022-10-29 RX ADMIN — ONDANSETRON 4 MG: 4 TABLET, ORALLY DISINTEGRATING ORAL at 06:20

## 2022-10-29 RX ADMIN — APIXABAN 5 MG: 5 TABLET, FILM COATED ORAL at 09:34

## 2022-10-29 RX ADMIN — EMPAGLIFLOZIN 10 MG: 10 TABLET, FILM COATED ORAL at 09:39

## 2022-10-29 RX ADMIN — ACETAMINOPHEN 650 MG: 325 TABLET, FILM COATED ORAL at 00:12

## 2022-10-29 RX ADMIN — PANTOPRAZOLE SODIUM 40 MG: 40 TABLET, DELAYED RELEASE ORAL at 06:20

## 2022-10-29 RX ADMIN — ASPIRIN 81 MG: 81 TABLET, COATED ORAL at 09:32

## 2022-10-29 RX ADMIN — FINASTERIDE 5 MG: 5 TABLET, FILM COATED ORAL at 09:33

## 2022-10-29 NOTE — ASSESSMENT & PLAN NOTE
Wt Readings from Last 3 Encounters:   10/29/22 69 kg (152 lb 1 9 oz)   10/26/22 72 2 kg (159 lb 3 2 oz)   09/14/22 74 4 kg (164 lb)     Results for orders placed during the hospital encounter of 03/24/22    Echo complete w/ contrast if indicated    Interpretation Summary  •  Left Ventricle: Left ventricular cavity size is normal  Wall thickness is normal  Systolic function is normal (55%)  Wall motion is normal  Diastolic function is mildly abnormal, consistent with grade I (abnormal) relaxation  •  Right Ventricle: Right ventricular cavity size is dilated  Systolic function is normal  An ICD lead is present  •  Right Atrium: The atrium is mildly dilated  An ICD lead is present  •  Aortic Valve: There is mild to moderate regurgitation  •  Mitral Valve: There is mild annular calcification  There is mild regurgitation  •  Tricuspid Valve: There is moderate regurgitation  The right ventricular systolic pressure is normal   •  Pulmonic Valve: There is mild regurgitation  •  Aorta: 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

## 2022-10-29 NOTE — ASSESSMENT & PLAN NOTE
Recent Labs     10/27/22  0446 10/28/22  0611 10/29/22  0603   HGB 12 5 11 1* 11 2*   MCV 90 87 89       Imrproved; recommend outpt follow up with PCP  No reports hemoptysis or bloody stools

## 2022-10-29 NOTE — PLAN OF CARE
Problem: PAIN - ADULT  Goal: Verbalizes/displays adequate comfort level or baseline comfort level  Description: Interventions:  - Encourage patient to monitor pain and request assistance  - Assess pain using appropriate pain scale  - Administer analgesics based on type and severity of pain and evaluate response  - Implement non-pharmacological measures as appropriate and evaluate response  - Consider cultural and social influences on pain and pain management  - Notify physician/advanced practitioner if interventions unsuccessful or patient reports new pain  Outcome: Progressing  Goal: Ability to express needs and understand communication  Outcome: Progressing     Problem: INFECTION - ADULT  Goal: Absence or prevention of progression during hospitalization  Description: INTERVENTIONS:  - Assess and monitor for signs and symptoms of infection  - Monitor lab/diagnostic results  - Monitor all insertion sites, i e  indwelling lines, tubes, and drains  - Monitor endotracheal if appropriate and nasal secretions for changes in amount and color  - [place] appropriate cooling/warming therapies per order  - Administer medications as ordered  - Instruct and encourage patient and family to use good hand hygiene technique  - Identify and instruct in appropriate isolation precautions for identified infection/condition  Outcome: Progressing  Goal: Absence of fever/infection during neutropenic period  Description: INTERVENTIONS:  - Monitor WBC    Outcome: Progressing     Problem: SAFETY ADULT  Goal: Patient will remain free of falls  Description: INTERVENTIONS:  - Educate patient/family on patient safety including physical limitations  - Instruct patient to call for assistance with activity   - Consult OT/PT to assist with strengthening/mobility   - Keep Call bell within reach  - Keep bed low and locked with side rails adjusted as appropriate  - Keep care items and personal belongings within reach  - Initiate and maintain comfort rounds  - Make Fall Risk Sign visible to staff  - Offer Toileting every 2 Hours, in advance of need  - Initiate/Maintain alarm  - Obtain necessary fall risk management equipment:   - Apply yellow socks and bracelet for high fall risk patients  - Consider moving patient to room near nurses station  Outcome: Progressing  Goal: Maintain or return to baseline ADL function  Description: INTERVENTIONS:  -  Assess patient's ability to carry out ADLs; assess patient's baseline for ADL function and identify physical deficits which impact ability to perform ADLs (bathing, care of mouth/teeth, toileting, grooming, dressing, etc )  - Assess/evaluate cause of self-care deficits   - Assess range of motion  - Assess patient's mobility; develop plan if impaired  - Assess patient's need for assistive devices and provide as appropriate  - Encourage maximum independence but intervene and supervise when necessary  - Involve family in performance of ADLs  - Assess for home care needs following discharge   - Consider OT consult to assist with ADL evaluation and planning for discharge  - Provide patient education as appropriate  Outcome: Progressing  Goal: Maintains/Returns to pre admission functional level  Description: INTERVENTIONS:  - Perform BMAT or MOVE assessment daily    - Set and communicate daily mobility goal to care team and patient/family/caregiver  - Collaborate with rehabilitation services on mobility goals if consulted  - Perform Range of Motion 2 times a day  - Reposition patient every 2 hours    - Dangle patient 2 times a day  - Stand patient 2 times a day  - Ambulate patient 2 times a day  - Out of bed to chair 2 times a day   - Out of bed for meals 2 times a day  - Out of bed for toileting  - Record patient progress and toleration of activity level   Outcome: Progressing     Problem: DISCHARGE PLANNING  Goal: Discharge to home or other facility with appropriate resources  Description: INTERVENTIONS:  - Identify barriers to discharge w/patient and caregiver  - Arrange for needed discharge resources and transportation as appropriate  - Identify discharge learning needs (meds, wound care, etc )  - Arrange for interpretive services to assist at discharge as needed  - Refer to Case Management Department for coordinating discharge planning if the patient needs post-hospital services based on physician/advanced practitioner order or complex needs related to functional status, cognitive ability, or social support system  Outcome: Progressing     Problem: Knowledge Deficit  Goal: Patient/family/caregiver demonstrates understanding of disease process, treatment plan, medications, and discharge instructions  Description: Complete learning assessment and assess knowledge base  Interventions:  - Provide teaching at level of understanding  - Provide teaching via preferred learning methods  Outcome: Progressing     Problem: Nutrition/Hydration-ADULT  Goal: Nutrient/Hydration intake appropriate for improving, restoring or maintaining nutritional needs  Description: Monitor and assess patient's nutrition/hydration status for malnutrition  Collaborate with interdisciplinary team and initiate plan and interventions as ordered  Monitor patient's weight and dietary intake as ordered or per policy  Utilize nutrition screening tool and intervene as necessary  Determine patient's food preferences and provide high-protein, high-caloric foods as appropriate       INTERVENTIONS:  - Monitor oral intake, urinary output, labs, and treatment plans  - Assess nutrition and hydration status and recommend course of action  - Evaluate amount of meals eaten  - Assist patient with eating if necessary   - Allow adequate time for meals  - Recommend/ encourage appropriate diets, oral nutritional supplements, and vitamin/mineral supplements  - Order, calculate, and assess calorie counts as needed  - Recommend, monitor, and adjust tube feedings and TPN/PPN based on assessed needs  - Assess need for intravenous fluids  - Provide specific nutrition/hydration education as appropriate  - Include patient/family/caregiver in decisions related to nutrition  Outcome: Progressing

## 2022-10-29 NOTE — PLAN OF CARE
Problem: PAIN - ADULT  Goal: Verbalizes/displays adequate comfort level or baseline comfort level  Description: Interventions:  - Encourage patient to monitor pain and request assistance  - Assess pain using appropriate pain scale  - Administer analgesics based on type and severity of pain and evaluate response  - Implement non-pharmacological measures as appropriate and evaluate response  - Consider cultural and social influences on pain and pain management  - Notify physician/advanced practitioner if interventions unsuccessful or patient reports new pain  Outcome: Progressing  Goal: Ability to express needs and understand communication  Outcome: Progressing     Problem: INFECTION - ADULT  Goal: Absence or prevention of progression during hospitalization  Description: INTERVENTIONS:  - Assess and monitor for signs and symptoms of infection  - Monitor lab/diagnostic results  - Monitor all insertion sites, i e  indwelling lines, tubes, and drains  - Monitor endotracheal if appropriate and nasal secretions for changes in amount and color  - [place] appropriate cooling/warming therapies per order  - Administer medications as ordered  - Instruct and encourage patient and family to use good hand hygiene technique  - Identify and instruct in appropriate isolation precautions for identified infection/condition  Outcome: Progressing  Goal: Absence of fever/infection during neutropenic period  Description: INTERVENTIONS:  - Monitor WBC    Outcome: Progressing     Problem: SAFETY ADULT  Goal: Patient will remain free of falls  Description: INTERVENTIONS:  - Educate patient/family on patient safety including physical limitations  - Instruct patient to call for assistance with activity   - Consult OT/PT to assist with strengthening/mobility   - Keep Call bell within reach  - Keep bed low and locked with side rails adjusted as appropriate  - Keep care items and personal belongings within reach  - Initiate and maintain comfort rounds  - Make Fall Risk Sign visible to staff  - Offer Toileting every 2 Hours, in advance of need  - Initiate/Maintain alarms  - Obtain necessary fall risk management equipment  - Apply yellow socks and bracelet for high fall risk patients  - Consider moving patient to room near nurses station  Outcome: Progressing  Goal: Maintain or return to baseline ADL function  Description: INTERVENTIONS:  -  Assess patient's ability to carry out ADLs; assess patient's baseline for ADL function and identify physical deficits which impact ability to perform ADLs (bathing, care of mouth/teeth, toileting, grooming, dressing, etc )  - Assess/evaluate cause of self-care deficits   - Assess range of motion  - Assess patient's mobility; develop plan if impaired  - Assess patient's need for assistive devices and provide as appropriate  - Encourage maximum independence but intervene and supervise when necessary  - Involve family in performance of ADLs  - Assess for home care needs following discharge   - Consider OT consult to assist with ADL evaluation and planning for discharge  - Provide patient education as appropriate  Outcome: Progressing  Goal: Maintains/Returns to pre admission functional level  Description: INTERVENTIONS:  - Perform BMAT or MOVE assessment daily    - Set and communicate daily mobility goal to care team and patient/family/caregiver  - Collaborate with rehabilitation services on mobility goals if consulted  - Perform Range of Motion 3 times a day  - Reposition patient every 2 hours    - Dangle patient 3 times a day  - Stand patient 3 times a day  - Ambulate patient 3 times a day  - Out of bed to chair 3 times a day   - Out of bed for meals 3 times a day  - Out of bed for toileting  - Record patient progress and toleration of activity level   Outcome: Progressing

## 2022-10-29 NOTE — DISCHARGE SUMMARY
[facility]  [facility] 1940[facility] 80 [facility]  [facility] MRN: [id number]  Unit/Bed#: -01 Encounter: [id number]  Primary Care Provider: OSKAR Higuera   Date and time admitted to hospital: 10/27/2022  4:36 AM    * Precordial pain  Assessment & Plan  Patient having chest pain for the past 2 weeks worsening  Described as pinching sensation radiating to neck associated with headache and mild tenderness to the area on the neck and area of pacemaker    Lab Results   Component Value Date    HSTNI0 12 10/27/2022    HSTNI2 10 10/27/2022    HSTNID2 -2 10/27/2022     Lab Results   Component Value Date    NTBNP 151 10/27/2022     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  Cardiology brought on board for further workup  · Appreciate recommendations  · Monitor on telemetry  · Pharmacologic stress test today-pending results    Chronic diastolic congestive heart failure ([address] )  Assessment & Plan  Wt Readings from Last 3 Encounters:   10/29/22 69 kg (152 lb 1 9 oz)   10/26/22 72 2 kg (159 lb 3 2 oz)   09/14/22 74 4 kg (164 lb)     Results for orders placed during the hospital encounter of 03/24/22    Echo complete w/ contrast if indicated    Interpretation Summary  •  Left Ventricle: Left ventricular cavity size is normal  Wall thickness is normal  Systolic function is normal (55%)  Wall motion is normal  Diastolic function is mildly abnormal, consistent with grade I (abnormal) relaxation  •  Right Ventricle: Right ventricular cavity size is dilated  Systolic function is normal  An ICD lead is present  •  Right Atrium: The atrium is mildly dilated  An ICD lead is present  •  Aortic Valve: There is mild to moderate regurgitation  •  Mitral Valve: There is mild annular calcification  There is mild regurgitation  •  Tricuspid Valve: There is moderate regurgitation  The right ventricular systolic pressure is normal   •  Pulmonic Valve:  There is mild regurgitation  •  Aorta: 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      Anemia in other chronic diseases classified elsewhere  Assessment & Plan  Recent Labs     10/27/22  0446 10/28/22  0611 10/29/22  0603   HGB 12 5 11 1* 11 2*   MCV 90 87 89       Imrproved; recommend outpt follow up with PCP  No reports hemoptysis or bloody stools    AICD (automatic cardioverter/defibrillator) present  Assessment & Plan  Has an ICD in place  EKG showing PVCs with incomplete RBBB  Getting regular device checks  Cardiology on board should the patient require additional interrogation of device    Mixed hyperlipidemia  Assessment & Plan  · Continue statin    Paroxysmal A-fib ([address] )  Assessment & Plan  · On Admission EKG - NSR; paced  · continue amiodarone, digoxin, eliquis     Atherosclerosis of native coronary artery of native heart without angina pectoris  Assessment & Plan  Continue home meds      Medical Problems             Resolved Problems  Date Reviewed: 10/29/2022   None               Discharging Physician / Practitioner: Carlos Stevens  PCP: Mallory Childs, [address]  Admission Date:   Admission Orders (From admission, onward)     Ordered        10/28/22 1533  Inpatient Admission  Once            10/27/22 1120  Place in Observation  Once                      Discharge Date: 10/29/22    Consultations During Hospital Stay:  · Cardiology    Procedures Performed:   · Nuclear medicine stress test    Significant Findings / Test Results:   XR chest 1 view portable    Result Date: 10/27/2022  Impression: No acute pulmonary disease Workstation performed: [id number]     CT chest wo contrast    Result Date: 10/27/2022  Impression: Left-sided single-lead AICD in place   Evaluation of the soft tissues immediately surrounding the generator is limited by streak artifact  However, no definite fluid collections are seen and it is unchanged in appearance compared to May 2022  No acute pulmonary pathology  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  Follow-up recommended in 12 months  Workstation performed: [id number]       No Chest XR results available for this patient  Results for orders placed during the hospital encounter of 10/27/22    Echo complete w/ contrast if indicated    Interpretation Summary  •  Left Ventricle: Left ventricular cavity size is normal  Wall thickness is mildly increased  There is mild concentric hypertrophy  The left ventricular ejection fraction is 60%  Systolic function is normal  Wall motion is normal  Diastolic function is mildly abnormal, consistent with grade I (abnormal) relaxation  •  Left Atrium: The atrium is mildly dilated  •  Right Atrium: The atrium is mildly dilated  •  Aortic Valve: There is moderate regurgitation  •  Mitral Valve: There is moderate thickening  There is mild regurgitation  •  Tricuspid Valve: There is mild regurgitation  •  Aorta: The aortic root is mildly dilated  •  Compared to the prior echocardiogram March 24, 2022 there was previously mild to moderate aortic regurgitation  Recent Labs     10/27/22  1740   BLOODCX No Growth at 24 hrs  No Growth at 24 hrs  Incidental Findings:   · None     Test Results Pending at Discharge (will require follow up): · None     Outpatient Tests Requested:  · None    Complications:  None    Reason for Admission:   Chief Complaint   Patient presents with   • Chest Pain     Pt arrived via wheelchair with c/o cp around defibrillator  Numbness and tingling           Hospital Course:   Dave Smith is a 80 y o  male patient who originally presented to the hospital on 10/27/2022 due to chest burning and stinging sensation as worsening of the past few days  PMHx significant for AFib on Eliquis, hypertension, CAD status post MI in 2013, ICD ppm in place  Had burning and stinging sensation in location the ICD site with radiation of the pain to the neck  Admitted for cardiac evaluation and assessment  Cardiology was brought on board and perform nuclear medicine for plastic stress test which is overall negative  Patient was instructed to follow-up with outpatient EP    Please see above list of diagnoses and related plan for additional information  Condition at Discharge: stable    Discharge Day Visit / Exam:   Subjective: Today patient eager to go home  Daughter is present at bedside  Understanding of plan  Patient also understanding of plan  Agreeable  All questions answered  Vitals: Blood Pressure: 124/71 (10/29/22 0559)  Pulse: 70 (10/29/22 0559)  Temperature: 98 °F (36 7 °C) (10/29/22 0559)  Temp Source: Oral (10/27/22 0437)  Respirations: 18 (10/29/22 0559)  Height: 6' 2" (188 cm) (10/28/22 1104)  Weight - Scale: 69 kg (152 lb 1 9 oz) (10/29/22 0552)  SpO2: 100 % (10/29/22 0559)  Exam:   Physical Exam  Vitals and nursing note reviewed  Constitutional:       General: He is not in acute distress  Appearance: He is well-developed  He is not diaphoretic  HENT:      Head: Normocephalic and atraumatic  Nose: Nose normal    Eyes:      General: No scleral icterus  Conjunctiva/sclera: Conjunctivae normal       Pupils: Pupils are equal, round, and reactive to light  Neck:      Thyroid: No thyromegaly  Cardiovascular:      Rate and Rhythm: Normal rate and regular rhythm  Pulses:           Radial pulses are 2+ on the right side and 2+ on the left side  Heart sounds: Normal heart sounds  No murmur heard  No friction rub  No gallop  Pulmonary:      Effort: Pulmonary effort is normal  No respiratory distress  Breath sounds: Normal breath sounds  No stridor  No wheezing or rales     Chest:      Chest wall: No deformity (Pacemaker) or tenderness  Comments: No rashes, or redness noted around pacemaker site  Abdominal:      General: Bowel sounds are normal  There is no distension  Palpations: Abdomen is soft  There is no mass  Tenderness: There is no abdominal tenderness  Musculoskeletal:         General: No deformity  Normal range of motion  Cervical back: Normal range of motion and neck supple  Right lower leg: No edema  Left lower leg: No edema  Skin:     General: Skin is warm and dry  Neurological:      Mental Status: He is alert and oriented to person, place, and time  Psychiatric:         Behavior: Behavior normal          Thought Content: Thought content normal          Judgment: Judgment normal           Discussion with Family: Updated  (daughter) at bedside  Discharge instructions/Information to patient and family:   See after visit summary for information provided to patient and family  Provisions for Follow-Up Care:  See after visit summary for information related to follow-up care and any pertinent home health orders  Disposition:   Home    Planned Readmission:  None     Discharge Statement:  I spent 46 minutes discharging the patient  This time was spent on the day of discharge  I had direct contact with the patient on the day of discharge  Greater than 50% of the total time was spent examining patient, answering all patient questions, arranging and discussing plan of care with patient as well as directly providing post-discharge instructions  Additional time then spent on discharge activities  Discharge Medications:  See after visit summary for reconciled discharge medications provided to patient and/or family        **Please Note: This note may have been constructed using a voice recognition system**

## 2022-10-29 NOTE — UTILIZATION REVIEW
Continued Stay Review    Date: 10/29                          Current Patient Class: IP   Current Level of Care: MS    HPI:82 y o  male initially admitted on  10/28 for cardiac workup     Assessment/Plan:     10/28 Cardiology Consult   Pain around ICD pocket   CP Trop - x2, BNP , CXR, EKG-PVC's Incomplete RBBB, non-specific T wave changes, Blood cultures ordered, Device check , Digoxin, Entresto, Amio, Pharm stress test, EP outpatient follow up   Hypertension  BP /57-66,  Currently controlled on Entresto      Vital Signs:   10/29/22 09:29:24 -- 80 -- 82/50 Abnormal  61 Abnormal  99 % -- --   10/29/22 05:59:10 98 °F (36 7 °C) 70 18 124/71 89 100 % --          Pertinent Labs/Diagnostic Results:     10/28 Stress test   Stress ECG: No ST deviation is noted  The ECG was not diagnostic due to pharmacological (vasodilator) stress  •  Stress Function: Left ventricular function post-stress is abnormal  Global function is mildly reduced  There were no regional wall motion abnormalities during stress  Post-stress ejection fraction is 44 %  •  Perfusion: There is a left ventricular perfusion defect that is medium in size with moderate reduction in uptake present in the inferior location(s) that is fixed  Partial improvement with prone imaging  •  Findings are consistent with old mi or soft tissue attenuation  No ischemia seen      10/28 Echo  Left Ventricle: Left ventricular cavity size is normal  Wall thickness is mildly increased  There is mild concentric hypertrophy  The left ventricular ejection fraction is 60%  Systolic function is normal  Wall motion is normal  Diastolic function is mildly abnormal, consistent with grade I (abnormal) relaxation  •  Left Atrium: The atrium is mildly dilated  •  Right Atrium: The atrium is mildly dilated  •  Aortic Valve: There is moderate regurgitation  •  Mitral Valve: There is moderate thickening  There is mild regurgitation  •  Tricuspid Valve:  There is mild regurgitation  •  Aorta: The aortic root is mildly dilated  •  Compared to the prior echocardiogram March 24, 2022 there was previously mild to moderate aortic regurgitation  Results from last 7 days   Lab Units 10/29/22  0603 10/28/22  0611 10/27/22  0446 10/26/22  1107   WBC Thousand/uL 3 78* 3 33* 4 54 3 64*   HEMOGLOBIN g/dL 11 2* 11 1* 12 5 13 7   HEMATOCRIT % 35 1* 33 8* 39 0 43 0   PLATELETS Thousands/uL 128* 120* 127* 126*   NEUTROS ABS Thousands/µL 1 42* 0 92* 1 61*  --      Results from last 7 days   Lab Units 10/28/22  0611 10/27/22  0446 10/26/22  1107   SODIUM mmol/L 137 139 139   POTASSIUM mmol/L 4 1 3 7 4 7   CHLORIDE mmol/L 104 102 102   CO2 mmol/L 27 27 26   ANION GAP mmol/L 6 10 11   BUN mg/dL 12 13 12   CREATININE mg/dL 1 08 1 25 1 26   EGFR ml/min/1 73sq m 63 53 52   CALCIUM mg/dL 8 3 8 8 9 0   MAGNESIUM mg/dL 2 4  --  2 2     Results from last 7 days   Lab Units 10/28/22  0611 10/27/22  0446 10/26/22  1107   AST U/L 30 24 30   ALT U/L 26 30 31   ALK PHOS U/L 49 56 54   TOTAL PROTEIN g/dL 6 7 8 2 8 4   ALBUMIN g/dL 3 0* 3 8 3 7   TOTAL BILIRUBIN mg/dL 0 93 1 13* 1 32*     Results from last 7 days   Lab Units 10/28/22  0611 10/27/22  0446   GLUCOSE RANDOM mg/dL 92 107     Results from last 7 days   Lab Units 10/26/22  1107   HEMOGLOBIN A1C % 5 0   EAG mg/dl 97       Results from last 7 days   Lab Units 10/27/22  0640 10/27/22  0446   HS TNI 0HR ng/L  --  12   HS TNI 2HR ng/L 10  --    HSTNI D2 ng/L -2  --      Results from last 7 days   Lab Units 10/27/22  0446   PROTIME seconds 16 7*   INR  1 38*   PTT seconds 31     Results from last 7 days   Lab Units 10/28/22  0611   TSH 3RD GENERATON uIU/mL 2 338     Results from last 7 days   Lab Units 10/27/22  1740   DIGOXIN LVL ng/mL 1 0     Results from last 7 days   Lab Units 10/27/22  0446   NT-PRO BNP pg/mL 151     Results from last 7 days   Lab Units 10/27/22  1740   BLOOD CULTURE  No Growth at 24 hrs  No Growth at 24 hrs           Medications: Scheduled Medications:  amiodarone, 200 mg, Oral, Daily  apixaban, 5 mg, Oral, BID  aspirin, 81 mg, Oral, Daily  atorvastatin, 20 mg, Oral, HS  digoxin, 125 mcg, Oral, Daily  Empagliflozin, 10 mg, Oral, Daily  finasteride, 5 mg, Oral, Daily  multivitamin stress formula, 1 tablet, Oral, Daily  pantoprazole, 40 mg, Oral, Early Morning  sacubitril-valsartan, 1 tablet, Oral, BID  tamsulosin, 0 4 mg, Oral, Daily With Dinner      Continuous IV Infusions:     PRN Meds:  acetaminophen, 650 mg, Oral, Q6H PRN  ondansetron, 4 mg, Oral, Q6H PRN        Discharge Plan: TBD     Network Utilization Review Department  ATTENTION: Please call with any questions or concerns to [phone] and carefully listen to the prompts so that you are directed to the right person  All voicemails are confidential   Noé Valenzuela all requests for admission clinical reviews, approved or denied determinations and any other requests to dedicated fax number below belonging to the campus where the patient is receiving treatment   List of dedicated fax numbers for the Facilities:  [address] DENIALS (Administrative/Medical Necessity) [phone]   [address] (Maternity/NICU/Pediatrics) [phone]   0 Amy Spencer [phone]   [address] [phone]   [address] [place] [address] Fiva Kel [address] [phone]   [address] [facility] Shu Talamantes [facility] 134 [address] [facility] [phone]

## 2022-10-31 LAB
ATRIAL RATE: 73 BPM
P AXIS: 59 DEGREES
PR INTERVAL: 180 MS
QRS AXIS: -53 DEGREES
QRSD INTERVAL: 108 MS
QT INTERVAL: 442 MS
QTC INTERVAL: 516 MS
T WAVE AXIS: 85 DEGREES
VENTRICULAR RATE: 82 BPM

## 2022-10-31 NOTE — UTILIZATION REVIEW
NOTIFICATION OF INPATIENT ADMISSION   AUTHORIZATION REQUEST   SERVICING FACILITY:   [address] [place] [address]  Tax ID: [id number]  NPI: [id number] ATTENDING PROVIDER:  Attending Name and NPI#: Shawn Power [[id number]]  Address: [address] [place] [address]  Phone: [phone]     ADMISSION INFORMATION:  Place of Service: [place] [address]  Place of Service Code: 21  Inpatient Admission Date/Time: 10/28/22  3:33 PM  Discharge Date/Time: 10/29/2022  5:26 PM  Admitting Diagnosis Code/Description:  Chest pain, unspecified [R07 9]  Chest pain [R07 9]  Abnormal ECG [R94 31]  AICD (automatic cardioverter/defibrillator) present [Z95 810]     UTILIZATION REVIEW CONTACT:  Tiffany Jorge Utilization   Network Utilization Review Department  Phone: [phone]  Fax [phone]  Email: [email or web address]  Contact for approvals/pending authorizations, clinical reviews, and discharge  PHYSICIAN ADVISORY SERVICES:  Medical Necessity Denial & [facility] Review  Phone: [phone]  Fax: [phone]  Email: [email or web address]

## 2022-11-01 ENCOUNTER — TELEPHONE (OUTPATIENT)
Dept: HEMATOLOGY ONCOLOGY | Facility: CLINIC | Age: 82
End: 2022-11-01

## 2022-11-01 ENCOUNTER — TRANSITIONAL CARE MANAGEMENT (OUTPATIENT)
Dept: FAMILY MEDICINE CLINIC | Facility: CLINIC | Age: 82
End: 2022-11-01

## 2022-11-01 LAB
BACTERIA BLD CULT: NORMAL
BACTERIA BLD CULT: NORMAL

## 2022-11-01 NOTE — TELEPHONE ENCOUNTER
Made attempt to schedule a new patient appointment with Hematology oncology, patient advise that he no longer needs to be seen  Patient decline to schedule a appointment   Closing referral

## 2022-11-01 NOTE — UTILIZATION REVIEW
Sarai Fitch DO   Physician   Hospitalist   Discharge Summary       Signed   Date of Service:  10/29/2022  8:58 AM                 Signed              Show:Clear all  [x]Manual[x]Template[x]Copied    Added by:  [x]Jas Allen DO      []Lobo for details    [address] [facility]  Discharge- Alex Cervantes 1940, 80 y o  male MRN: [id number]  Unit/Bed#: -01 Encounter: [id number]  Primary Care Provider: OSKAR Betancur   Date and time admitted to hospital: 10/27/2022  4:36 AM     * Precordial pain  Assessment & Plan  Patient having chest pain for the past 2 weeks worsening  Described as pinching sensation radiating to neck associated with headache and mild tenderness to the area on the neck and area of pacemaker           Lab Results   Component Value Date     HSTNI0 12 10/27/2022     HSTNI2 10 10/27/2022     HSTNID2 -2 10/27/2022            Lab Results   Component Value Date     NTBNP 151 10/27/2022      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  Cardiology brought on board for further workup  · Appreciate recommendations  · Monitor on telemetry  · Pharmacologic stress test today-pending results     Chronic diastolic congestive heart failure ([address] )  Assessment & Plan      Wt Readings from Last 3 Encounters:   10/29/22 69 kg (152 lb 1 9 oz)   10/26/22 72 2 kg (159 lb 3 2 oz)   09/14/22 74 4 kg (164 lb)      Results for orders placed during the hospital encounter of 03/24/22     Echo complete w/ contrast if indicated     Interpretation Summary  •  Left Ventricle: Left ventricular cavity size is normal  Wall thickness is normal  Systolic function is normal (55%)  Wall motion is normal  Diastolic function is mildly abnormal, consistent with grade I (abnormal) relaxation  •  Right Ventricle: Right ventricular cavity size is dilated  Systolic function is normal  An ICD lead is present  •  Right Atrium: The atrium is mildly dilated   An ICD lead is present  •  Aortic Valve: There is mild to moderate regurgitation  •  Mitral Valve: There is mild annular calcification  There is mild regurgitation  •  Tricuspid Valve: There is moderate regurgitation  The right ventricular systolic pressure is normal   •  Pulmonic Valve: There is mild regurgitation  •  Aorta: 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)   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)      Not in acute exac   · Continue enestro  · SGLT 2-empagliflozin as dapagliflozin is not on formulary  · Monitor I/O, daily weight  · Does not appear to be in exacerbation at this time  · Cardiology on board-appreciate recommendations        Anemia in other chronic diseases classified elsewhere  Assessment & Plan        Recent Labs     10/27/22  0446 10/28/22  0611 10/29/22  0603   HGB 12 5 11 1* 11 2*   MCV 90 87 89         Imrproved; recommend outpt follow up with PCP  No reports hemoptysis or bloody stools     AICD (automatic cardioverter/defibrillator) present  Assessment & Plan  Has an ICD in place  EKG showing PVCs with incomplete RBBB  Getting regular device checks  Cardiology on board should the patient require additional interrogation of device     Mixed hyperlipidemia  Assessment & Plan  · Continue statin     Paroxysmal A-fib ([address] )  Assessment & Plan  · On Admission EKG - NSR; paced  · continue amiodarone, digoxin, eliquis      Atherosclerosis of native coronary artery of native heart without angina pectoris  Assessment & Plan  Continue home meds            Medical Problems                  Resolved Problems  Date Reviewed: 10/29/2022   None                   Discharging Physician / Practitioner: Pb Vera  PCP: Sherin Atwood  Admission Date:       Admission Orders (From admission, onward)              Ordered          10/28/22 1533   Inpatient Admission  Once              10/27/22 1120   Place in Observation  Once                          Discharge Date: 10/29/22     Consultations During [facility]:  · Cardiology     Procedures Performed:   · Nuclear medicine stress test     Significant Findings / Test Results:   XR chest 1 view portable     Result Date: 10/27/2022  Impression: No acute pulmonary disease Workstation performed: [id number]      CT chest wo contrast     Result Date: 10/27/2022  Impression: Left-sided single-lead AICD in place  Evaluation of the soft tissues immediately surrounding the generator is limited by streak artifact  However, no definite fluid collections are seen and it is unchanged in appearance compared to May 2022  No acute pulmonary pathology  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  Follow-up recommended in 12 months  Workstation performed: [id number]         No Chest XR results available for this patient  Results for orders placed during the hospital encounter of 10/27/22     Echo complete w/ contrast if indicated     Interpretation Summary  •  Left Ventricle: Left ventricular cavity size is normal  Wall thickness is mildly increased  There is mild concentric hypertrophy  The left ventricular ejection fraction is 60%  Systolic function is normal  Wall motion is normal  Diastolic function is mildly abnormal, consistent with grade I (abnormal) relaxation  •  Left Atrium: The atrium is mildly dilated  •  Right Atrium: The atrium is mildly dilated  •  Aortic Valve: There is moderate regurgitation  •  Mitral Valve: There is moderate thickening  There is mild regurgitation  •  Tricuspid Valve: There is mild regurgitation  •  Aorta: The aortic root is mildly dilated  •  Compared to the prior echocardiogram March 24, 2022 there was previously mild to moderate aortic regurgitation             Recent Labs     10/27/22  1740   BLOODCX No Growth at 24 hrs  No Growth at 24 hrs          Incidental Findings:   · None      Test Results Pending at Discharge (will require follow up):    · None     Outpatient Tests Requested:  · None     Complications:  None     Reason for Admission:        Chief Complaint   Patient presents with   • Chest Pain       Pt arrived via wheelchair with c/o cp around defibrillator  Numbness and tingling             Hospital Course:   Elizabeth Rosales is a 80 y o  male patient who originally presented to the hospital on 10/27/2022 due to chest burning and stinging sensation as worsening of the past few days      PMHx significant for AFib on Eliquis, hypertension, CAD status post MI in 2013, ICD ppm in place  Had burning and stinging sensation in location the ICD site with radiation of the pain to the neck  Admitted for cardiac evaluation and assessment  Cardiology was brought on board and perform nuclear medicine for plastic stress test which is overall negative  Patient was instructed to follow-up with outpatient EP     Please see above list of diagnoses and related plan for additional information       Condition at Discharge: stable     Discharge Day Visit / Exam:   Subjective: Today patient eager to go home  Daughter is present at bedside  Understanding of plan  Patient also understanding of plan  Agreeable  All questions answered  Vitals: Blood Pressure: 124/71 (10/29/22 0559)  Pulse: 70 (10/29/22 0559)  Temperature: 98 °F (36 7 °C) (10/29/22 0559)  Temp Source: Oral (10/27/22 0437)  Respirations: 18 (10/29/22 0559)  Height: 6' 2" (188 cm) (10/28/22 1104)  Weight - Scale: 69 kg (152 lb 1 9 oz) (10/29/22 0552)  SpO2: 100 % (10/29/22 0559)  Exam:   Physical Exam  Vitals and nursing note reviewed  Constitutional:       General: He is not in acute distress  Appearance: He is well-developed  He is not diaphoretic  HENT:      Head: Normocephalic and atraumatic  Nose: Nose normal    Eyes:      General: No scleral icterus  Conjunctiva/sclera: Conjunctivae normal       Pupils: Pupils are equal, round, and reactive to light  Neck:      Thyroid: No thyromegaly     Cardiovascular:      Rate and Rhythm: Normal rate and regular rhythm  Pulses:           Radial pulses are 2+ on the right side and 2+ on the left side  Heart sounds: Normal heart sounds  No murmur heard  No friction rub  No gallop  Pulmonary:      Effort: Pulmonary effort is normal  No respiratory distress  Breath sounds: Normal breath sounds  No stridor  No wheezing or rales  Chest:      Chest wall: No deformity (Pacemaker) or tenderness  Comments: No rashes, or redness noted around pacemaker site  Abdominal:      General: Bowel sounds are normal  There is no distension  Palpations: Abdomen is soft  There is no mass  Tenderness: There is no abdominal tenderness  Musculoskeletal:         General: No deformity  Normal range of motion  Cervical back: Normal range of motion and neck supple  Right lower leg: No edema  Left lower leg: No edema  Skin:     General: Skin is warm and dry  Neurological:      Mental Status: He is alert and oriented to person, place, and time  Psychiatric:         Behavior: Behavior normal          Thought Content: Thought content normal          Judgment: Judgment normal             Discussion with Family: Updated  (daughter) at bedside      Discharge instructions/Information to patient and family:   See after visit summary for information provided to patient and family        Provisions for Follow-Up Care:  See after visit summary for information related to follow-up care and any pertinent home health orders  Disposition:   Home     Planned Readmission:  None     Discharge Statement:  I spent 46 minutes discharging the patient  This time was spent on the day of discharge  I had direct contact with the patient on the day of discharge  Greater than 50% of the total time was spent examining patient, answering all patient questions, arranging and discussing plan of care with patient as well as directly providing post-discharge instructions    Additional time then spent on discharge activities      Discharge Medications:  See after visit summary for reconciled discharge medications provided to patient and/or family        **Please Note: This note may have been constructed using a voice recognition system**

## 2022-11-04 ENCOUNTER — OFFICE VISIT (OUTPATIENT)
Dept: FAMILY MEDICINE CLINIC | Facility: CLINIC | Age: 82
End: 2022-11-04

## 2022-11-04 VITALS
HEIGHT: 74 IN | SYSTOLIC BLOOD PRESSURE: 120 MMHG | BODY MASS INDEX: 19.89 KG/M2 | TEMPERATURE: 97.5 F | WEIGHT: 155 LBS | DIASTOLIC BLOOD PRESSURE: 68 MMHG

## 2022-11-04 DIAGNOSIS — I48.0 PAROXYSMAL A-FIB (HCC): ICD-10-CM

## 2022-11-04 DIAGNOSIS — Z95.810 AICD (AUTOMATIC CARDIOVERTER/DEFIBRILLATOR) PRESENT: ICD-10-CM

## 2022-11-04 DIAGNOSIS — E46 PROTEIN-CALORIE MALNUTRITION, UNSPECIFIED SEVERITY (HCC): ICD-10-CM

## 2022-11-04 DIAGNOSIS — R07.2 PRECORDIAL PAIN: Primary | ICD-10-CM

## 2022-11-04 NOTE — ASSESSMENT & PLAN NOTE
Malnutrition Findings:        patient states he eats 3 meals per day  Denies adverse to food  Discussed with patient eating meals high in protein and healthy carbohydrates  To increase caloric intake  Advised to continue boost plus shakes twice daily  BMI Findings: Body mass index is 19 9 kg/m²

## 2022-11-04 NOTE — PROGRESS NOTES
FAMILY MEDICINE TRANSITION OF CARE OFFICE VISIT  [facility]    NAME: Alex Cervantes  AGE: 80 y o  SEX: male  : 1940   MRN: [id number]    DATE: 2022  TIME: 12:38 PM    Assessment and Plan     Problem List Items Addressed This Visit        Cardiovascular and Mediastinum    Paroxysmal A-fib ([address] )    Relevant Medications    apixaban (ELIQUIS) 5 mg       Other    Protein-calorie malnutrition, unspecified severity ([address] )     Malnutrition Findings:        patient states he eats 3 meals per day  Denies adverse to food  Discussed with patient eating meals high in protein and healthy carbohydrates  To increase caloric intake  Advised to continue boost plus shakes twice daily  BMI Findings: Body mass index is 19 9 kg/m²  AICD (automatic cardioverter/defibrillator) present     Device interrogation scheduled for   To keep appointments as scheduled  Precordial pain - Primary     Pain has improved  Still notes mild tenderness to upper anterior chest wall above AICD  Denies severe chest pain, shortness a breath, dizziness, syncope  To follow up with Cardiology an AICD interrogation appointments as scheduled  Return to office in: 1 month    Transitional Care Management Review   Marlys Reed is a 80 y o  male here for TCM follow-up    During the TCM phone call patient stated:    TCM Call     Date and time call was made  2022 11:39 AM    Patient was hospitialized at  [facility]    Date of Admission  10/27/22    Date of discharge  10/28/22    Diagnosis  Precordial pain      TCM Call     Scheduled for follow up? Yes    I have advised the patient to call PCP with any new or worsening symptoms  BEBO Fitzpatrick          History of Present Illness     Patient presents to the office for transition of care following admission to hospital from 10/27-10/29    Patient presents to the hospital for left-sided chest pain  Patient was seen in office day prior with similar symptoms  Was noted to have tenderness and pain to left anterior chest wall above AICD  Patient notes improvement of chest pain since leaving hospital   Still notes tenderness to AICD site, denies shortness of breath, palpitations, dizziness, syncope  Patient has follow-up appointments with cardiology and is to get device interrogated 11/18  The following portions of the patient's history were reviewed and updated as appropriate: allergies, current medications, past family history, past medical history, past social history, past surgical history and problem list     Review of Systems   Review of Systems    Active Problem List     Patient Active Problem List   Diagnosis   • Protein-calorie malnutrition, unspecified severity ([facility] [address] )   • Atherosclerosis of native coronary artery of native heart without angina pectoris   • Paroxysmal A-fib ([facility] [address] )   • Mixed hyperlipidemia   • AICD (automatic cardioverter/defibrillator) present   • Chronic diastolic congestive heart failure ([facility] [address] )   • Gastroesophageal reflux disease   • Precordial pain   • Anemia in other chronic diseases classified elsewhere       Objective   /68 (BP Location: Left arm, Patient Position: Sitting, Cuff Size: Standard)   Temp 97 5 °F (36 4 °C) (Tympanic)   Ht 6' 2" (1 88 m)   Wt 70 3 kg (155 lb)   BMI 19 90 kg/m²     Physical Exam  Vitals reviewed  Constitutional:       General: He is not in acute distress  Appearance: Normal appearance  He is not ill-appearing  HENT:      Head: Normocephalic and atraumatic  Right Ear: Tympanic membrane, ear canal and external ear normal       Left Ear: Tympanic membrane, ear canal and external ear normal       Nose: Nose normal       Mouth/Throat:      Mouth: Mucous membranes are moist       Pharynx: Oropharynx is clear     Eyes:      Conjunctiva/sclera: Conjunctivae normal       Pupils: Pupils are equal, round, and reactive to light    Neck:      Vascular: No carotid bruit  Cardiovascular:      Rate and Rhythm: Normal rate and regular rhythm  Pulses: Normal pulses  Heart sounds: Normal heart sounds  No murmur heard  Pulmonary:      Effort: Pulmonary effort is normal  No respiratory distress  Breath sounds: Normal breath sounds  Chest:      Chest wall: Tenderness (mild tenderness noted to left upper ACW above AICD) present  No mass, crepitus or edema  Abdominal:      General: Bowel sounds are normal       Palpations: Abdomen is soft  Tenderness: There is no abdominal tenderness  Musculoskeletal:         General: Normal range of motion  Cervical back: Normal range of motion and neck supple  Right lower leg: No edema  Left lower leg: No edema  Skin:     General: Skin is warm and dry  Capillary Refill: Capillary refill takes less than 2 seconds  Neurological:      General: No focal deficit present  Mental Status: He is alert and oriented to person, place, and time     Psychiatric:         Mood and Affect: Mood normal          Behavior: Behavior normal          Pertinent Laboratory/Diagnostic Studies:  CBC:   Lab Results   Component Value Date/Time    WBC 3 78 (L) 10/29/2022 06:03 AM    RBC 3 93 10/29/2022 06:03 AM    HGB 11 2 (L) 10/29/2022 06:03 AM    HCT 35 1 (L) 10/29/2022 06:03 AM    MCV 89 10/29/2022 06:03 AM    MCH 28 5 10/29/2022 06:03 AM    MCHC 31 9 10/29/2022 06:03 AM    RDW 15 1 10/29/2022 06:03 AM    MPV 12 4 10/29/2022 06:03 AM     (L) 10/29/2022 06:03 AM    NRBC 0 10/29/2022 06:03 AM    NEUTOPHILPCT 37 (L) 10/29/2022 06:03 AM    LYMPHOPCT 53 (H) 10/29/2022 06:03 AM    MONOPCT 9 10/29/2022 06:03 AM    EOSPCT 1 10/29/2022 06:03 AM    BASOPCT 0 10/29/2022 06:03 AM    NEUTROABS 1 42 (L) 10/29/2022 06:03 AM    LYMPHSABS 1 95 10/29/2022 06:03 AM    MONOSABS 0 35 10/29/2022 06:03 AM    EOSABS 0 04 10/29/2022 06:03 AM     Chemistry Profile:   Lab Results   Component Value Date/Time    K 4 1 10/28/2022 06:11 AM     10/28/2022 06:11 AM    CO2 27 10/28/2022 06:11 AM    BUN 12 10/28/2022 06:11 AM    CREATININE 1 08 10/28/2022 06:11 AM    GLUC 92 10/28/2022 06:11 AM    GLUF 92 10/28/2022 06:11 AM    CALCIUM 8 3 10/28/2022 06:11 AM    CORRECTEDCA 9 1 10/28/2022 06:11 AM    MG 2 4 10/28/2022 06:11 AM    AST 30 10/28/2022 06:11 AM    ALT 26 10/28/2022 06:11 AM    ALKPHOS 49 10/28/2022 06:11 AM    EGFR 63 10/28/2022 06:11 AM     Coagulation Studies:   Lab Results   Component Value Date/Time    PROTIME 16 7 (H) 10/27/2022 04:46 AM    INR 1 38 (H) 10/27/2022 04:46 AM    PTT 31 10/27/2022 04:46 AM     Cardiac Studies:   Lab Results   Component Value Date/Time    NTBNP 151 10/27/2022 04:46 AM     Endocrine Studies:   Lab Results   Component Value Date/Time    HGBA1C 5 0 10/26/2022 11:07 AM    GDS8ZPIWFBWO 2 338 10/28/2022 06:11 AM    TRIG 75 10/26/2022 11:07 AM    CHOLESTEROL 132 10/26/2022 11:07 AM    HDL 67 10/26/2022 11:07 AM    LDLCALC 50 10/26/2022 11:07 AM    NONHDLC 65 10/26/2022 11:07 AM     Health Maintenance:   Lab Results   Component Value Date/Time    PSA 4 6 (H) 09/07/2021 06:37 AM     Urine Protein/Creatinine Ratio:   Lab Results   Component Value Date/Time    CREATININEUR 131 0 09/07/2021 08:06 AM    LABMICR 16 0 09/07/2021 08:06 AM    MICROALBCRE 12 09/07/2021 08:06 AM     Urinalysis:   Lab Results   Component Value Date/Time    COLORU Yellow 12/21/2021 04:48 AM    CLARITYU Clear 12/21/2021 04:48 AM    SPECGRAV 1 010 12/21/2021 04:48 AM    PHUR 6 0 12/21/2021 04:48 AM    LEUKOCYTESUR (A) 12/21/2021 04:48 AM     Elevated glucose may cause decreased leukocyte values   See urine microscopic for [facility] result/    NITRITE Negative 12/21/2021 04:48 AM    GLUCOSEU >=1000 (1%) (A) 12/21/2021 04:48 AM    KETONESU Negative 12/21/2021 04:48 AM    BILIRUBINUR Negative 12/21/2021 04:48 AM    BLOODU Negative 12/21/2021 04:48 AM      Urine Micro:   Lab Results   Component Value Date/Time RBCUA None Seen 12/21/2021 04:48 AM    WBCUA None Seen 12/21/2021 04:48 AM    EPIS None Seen 12/21/2021 04:48 AM    BACTERIA None Seen 12/21/2021 04:48 AM     Cultures:   Lab Results   Component Value Date/Time    BLOODCX No Growth After 5 Days  10/27/2022 05:40 PM    BLOODCX No Growth After 5 Days   10/27/2022 05:40 PM        Current Medications     Current Outpatient Medications:   •  amiodarone 200 mg tablet, TAKE 1 TABLET BY MOUTH EVERY DAY, Disp: 90 tablet, Rfl: 3  •  apixaban (ELIQUIS) 5 mg, Take 1 tablet (5 mg total) by mouth 2 (two) times a day, Disp: 60 tablet, Rfl: 0  •  aspirin (ECOTRIN LOW STRENGTH) 81 mg EC tablet, Take 81 mg by mouth daily, Disp: , Rfl:   •  atorvastatin (LIPITOR) 20 mg tablet, Take 1 tablet (20 mg total) by mouth daily (Patient taking differently: Take 20 mg by mouth daily at bedtime), Disp: 90 tablet, Rfl: 3  •  Dapagliflozin Propanediol (Farxiga) 10 MG TABS, Take 1 tablet (10 mg total) by mouth daily, Disp: 90 tablet, Rfl: 3  •  digoxin (LANOXIN) 0 125 mg tablet, Take 1 tablet (125 mcg total) by mouth daily, Disp: 90 tablet, Rfl: 3  •  finasteride (PROSCAR) 5 mg tablet, Take 1 tablet (5 mg total) by mouth daily, Disp: 90 tablet, Rfl: 3  •  hydrocortisone 2 5 % cream, APPLY TO AFFECTED AREA TWICE A DAY, Disp: 28 35 g, Rfl: 0  •  Multiple Vitamin (Daily-Misael Multivitamin) TABS, TAKE 1 TABLET BY MOUTH EVERY DAY, Disp: 90 tablet, Rfl: 1  •  omeprazole (PriLOSEC) 40 MG capsule, TAKE 1 CAPSULE BY MOUTH AS NEEDED (ACID REFLUX SYMPTOMS), Disp: 90 capsule, Rfl: 0  •  ondansetron (ZOFRAN) 4 mg tablet, Take 1 tablet (4 mg total) by mouth every 8 (eight) hours as needed for nausea or vomiting, Disp: 20 tablet, Rfl: 0  •  sacubitril-valsartan (Entresto) 49-51 MG TABS, Take 1 tablet by mouth 2 (two) times a day, Disp: 180 tablet, Rfl: 3  •  tamsulosin (FLOMAX) 0 4 mg, Take 1 capsule (0 4 mg total) by mouth daily with dinner, Disp: 90 capsule, Rfl: [address] [facility]

## 2022-11-04 NOTE — PATIENT INSTRUCTIONS
High Protein / High Calorie Diet   WHAT YOU NEED TO KNOW:   A high-protein and high-calorie diet is a meal plan with extra calories and protein  You may need this diet if you have certain health conditions that increase your body's need for protein and calories  Some of these health conditions include cancer, HIV, AIDS, wounds (such as pressure injuries and burns), and malnutrition  You may also need to follow this diet after a surgery or illness  The extra calories will help you gain weight and have more energy  The extra protein will help your body heal and get stronger  DISCHARGE INSTRUCTIONS:   Guidelines for increasing protein and calories:  Your dietitian will tell you how much protein and how many calories you need each day  It may be easier to increase calories and protein if you eat 6 to 8 small meals throughout the day  Eat at regular times, and do not skip meals  Always have snack foods available so you can eat when you feel hungry  Include a variety of healthy foods from all of the food groups  Foods that are high in protein or calories:   Hot cereals (oatmeal or cream of wheat) with milk, added fat (such as butter or margarine), and sugar    Granola and other cereals with dried fruit    Croissants, buttermilk biscuits, muffins, or quick breads (banana bread or zucchini bread)    Vegetables with added margarine, butter, cream cheese, and cheese    Potatoes with butter, margarine, sour cream, or cheese    Avocado    Fruit canned in heavy syrup, dried fruit, or fruit nectar    Meats, eggs, dried beans, and lentils    Peanut butter and tofu    Eggnog and milkshakes    Whole milk and whole milk products (yogurt, ice cream, and cheese)    Cream cheese and sour cream    Casseroles with meat    Soups made with cream or meat    How to add protein:   Add powdered milk to milk, cereals, scrambled eggs, soups, and casseroles  Add cheese to sauces, soups, or vegetables      Add eggs to tuna, salads, sauces, or casseroles  Add nutrition supplements and breakfast drink mixes to milk or shakes  Add nuts to foods or eat them as snacks  Add meat (beef, chicken, and pork) to soups, casseroles, pasta dishes, or vegetables  Add beans, peas, and other legumes to salads  Eat cottage cheese or yogurt with fruit  How to add calories:  You can add any of the following to foods to increase calories  1 tablespoon of butter or margarine (adds 100 calories)    1 tablespoon of mayonnaise (adds 100 calories)    1 tablespoon of heavy cream or whipping cream (adds 55 calories)    1 tablespoon of cream cheese (adds 50 calories)    1 tablespoon of brown sugar, maple syrup, or honey (adds 45 calories)    1 tablespoon of sour cream (adds 30 calories)    1 tablespoon of half-and-half cream or evaporated milk (adds 20 calories)    © Copyright [employer] 2022 Information is for End User's use only and may not be sold, redistributed or otherwise used for commercial purposes  All illustrations and images included in CareNotes® are the copyrighted property of [employer]  or [employer]   The above information is an  only  It is not intended as medical advice for individual conditions or treatments  Talk to your doctor, nurse or pharmacist before following any medical regimen to see if it is safe and effective for you

## 2022-11-04 NOTE — ASSESSMENT & PLAN NOTE
Pain has improved  Still notes mild tenderness to upper anterior chest wall above AICD  Denies severe chest pain, shortness a breath, dizziness, syncope  To follow up with Cardiology an AICD interrogation appointments as scheduled

## 2022-11-18 ENCOUNTER — IN-CLINIC DEVICE VISIT (OUTPATIENT)
Dept: CARDIOLOGY CLINIC | Facility: CLINIC | Age: 82
End: 2022-11-18

## 2022-11-18 DIAGNOSIS — Z95.810 AICD (AUTOMATIC CARDIOVERTER/DEFIBRILLATOR) PRESENT: Primary | ICD-10-CM

## 2022-11-18 NOTE — PROGRESS NOTES
Results for orders placed or performed in visit on 11/18/22   Cardiac EP device report    Narrative    SJM SINGLE ICD/NOT MRI CONDITIONAL  DEVICE INTERROGATED IN THE [place] OFFICE: BATTERY VOLTAGE ADEQUATE (1 8 YRS)   <1% ALL LEAD PARAMETERS WITHIN NORMAL LIMITS  NO SIGNIFICANT HIGH RATE EPISODES  CORVUE IMPEDANCE MONITORING WITHIN NORMAL LIMITS  NO PROGRAMMING CHANGES MADE TO DEVICE PARAMETERS  NORMAL DEVICE FUNCTION   AM/CH

## 2022-11-30 ENCOUNTER — OFFICE VISIT (OUTPATIENT)
Dept: CARDIOLOGY CLINIC | Facility: CLINIC | Age: 82
End: 2022-11-30

## 2022-11-30 VITALS
DIASTOLIC BLOOD PRESSURE: 56 MMHG | HEART RATE: 73 BPM | BODY MASS INDEX: 20.02 KG/M2 | HEIGHT: 74 IN | RESPIRATION RATE: 16 BRPM | SYSTOLIC BLOOD PRESSURE: 114 MMHG | WEIGHT: 156 LBS | OXYGEN SATURATION: 99 %

## 2022-11-30 DIAGNOSIS — I48.0 PAROXYSMAL A-FIB (HCC): ICD-10-CM

## 2022-11-30 DIAGNOSIS — Z95.810 AICD (AUTOMATIC CARDIOVERTER/DEFIBRILLATOR) PRESENT: ICD-10-CM

## 2022-11-30 DIAGNOSIS — I42.8 OTHER CARDIOMYOPATHY (HCC): ICD-10-CM

## 2022-11-30 DIAGNOSIS — R07.9 CHEST PAIN, UNSPECIFIED TYPE: Primary | ICD-10-CM

## 2022-11-30 DIAGNOSIS — I25.10 CORONARY ARTERY DISEASE INVOLVING NATIVE HEART WITHOUT ANGINA PECTORIS, UNSPECIFIED VESSEL OR LESION TYPE: ICD-10-CM

## 2022-11-30 PROBLEM — I42.9 MYOCARDIOPATHY (HCC): Status: ACTIVE | Noted: 2022-11-30

## 2022-11-30 RX ORDER — SACUBITRIL AND VALSARTAN 49; 51 MG/1; MG/1
1 TABLET, FILM COATED ORAL 2 TIMES DAILY
Qty: 180 TABLET | Refills: 3 | Status: SHIPPED | OUTPATIENT
Start: 2022-11-30

## 2022-11-30 NOTE — PROGRESS NOTES
Cardiology Follow Up    Alex Cervantes  1940  [address] [facility] [address] [place] [address] [place] [address] [phone]    1  Chest pain, unspecified type    2  Paroxysmal A-fib (HCC)  -     apixaban (ELIQUIS) 5 mg; Take 1 tablet (5 mg total) by mouth 2 (two) times a day    3  Other cardiomyopathy ([facility] 75 )  -     sacubitril-valsartan (Entresto) 49-51 MG TABS; Take 1 tablet by mouth 2 (two) times a day    4  Coronary artery disease involving native heart without angina pectoris, unspecified vessel or lesion type    5  AICD (automatic cardioverter/defibrillator) present          Interval History:  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 in [place], had a stent placed, and AICD inserted  He has done well since then but complains about left-sided chest pain which clearly is noncardiac in nature  He has had a negative stress test     He has discomfort around the AICD device  It is aggravated by pressing on it  He is awaiting an eat P evaluation to see if there is anything that can be done about it  Patient Active Problem List   Diagnosis   • Protein-calorie malnutrition, unspecified severity ([facility] )   • Coronary artery disease involving native heart without angina pectoris   • Paroxysmal A-fib (HCC)   • Mixed hyperlipidemia   • AICD (automatic cardioverter/defibrillator) present   • Chronic diastolic congestive heart failure ([facility] )   • Gastroesophageal reflux disease   • Chest pain   • Anemia in other chronic diseases classified elsewhere   • Myocardiopathy [facility])     Past Medical History:   Diagnosis Date   • CHF (congestive heart failure) ([facility])    • Hyperlipidemia    • Hypertension    • MI (myocardial infarction) ([facility] )    • Presence of cardiac defibrillator    • Prostate disorder      Social History     Socioeconomic History   • Marital status:   Spouse name: Not on file   • Number of children: Not on file   • Years of education: Not on file   • Highest education level: Not on file   Occupational History   • Not on file   Tobacco Use   • Smoking status: Never   • Smokeless tobacco: Never   Vaping Use   • Vaping Use: Never used   Substance and Sexual Activity   • Alcohol use: Not Currently   • Drug use: Never   • Sexual activity: Not on file   Other Topics Concern   • Not on file   Social History Narrative   • Not on file     Social Determinants of Health     Financial Resource Strain: Not on file   Food Insecurity: Not on file   Transportation Needs: Not on file   Physical Activity: Not on file   Stress: Not on file   Social Connections: Not on file   Intimate Partner Violence: Not on file   Housing Stability: Not on file      History reviewed  No pertinent family history    Past Surgical History:   Procedure Laterality Date   • CORONARY ANGIOPLASTY WITH STENT PLACEMENT         Current Outpatient Medications:   •  amiodarone 200 mg tablet, TAKE 1 TABLET BY MOUTH EVERY DAY, Disp: 90 tablet, Rfl: 3  •  apixaban (ELIQUIS) 5 mg, Take 1 tablet (5 mg total) by mouth 2 (two) times a day, Disp: 90 tablet, Rfl: 3  •  aspirin (ECOTRIN LOW STRENGTH) 81 mg EC tablet, Take 81 mg by mouth daily, Disp: , Rfl:   •  atorvastatin (LIPITOR) 20 mg tablet, Take 1 tablet (20 mg total) by mouth daily (Patient taking differently: Take 20 mg by mouth daily at bedtime), Disp: 90 tablet, Rfl: 3  •  Dapagliflozin Propanediol (Farxiga) 10 MG TABS, Take 1 tablet (10 mg total) by mouth daily, Disp: 90 tablet, Rfl: 3  •  digoxin (LANOXIN) 0 125 mg tablet, Take 1 tablet (125 mcg total) by mouth daily, Disp: 90 tablet, Rfl: 3  •  finasteride (PROSCAR) 5 mg tablet, Take 1 tablet (5 mg total) by mouth daily, Disp: 90 tablet, Rfl: 3  •  Multiple Vitamin (Daily-Misael Multivitamin) TABS, TAKE 1 TABLET BY MOUTH EVERY DAY, Disp: 90 tablet, Rfl: 1  •  sacubitril-valsartan (Entresto) 49-51 MG TABS, Take 1 tablet by mouth 2 (two) times a day, Disp: 180 tablet, Rfl: 3  •  tamsulosin (FLOMAX) 0 4 mg, Take 1 capsule (0 4 mg total) by mouth daily with dinner, Disp: 90 capsule, Rfl: 3  •  hydrocortisone 2 5 % cream, APPLY TO AFFECTED AREA TWICE A DAY (Patient not taking: Reported on 11/30/2022), Disp: 28 35 g, Rfl: 0  •  omeprazole (PriLOSEC) 40 MG capsule, TAKE 1 CAPSULE BY MOUTH AS NEEDED (ACID REFLUX SYMPTOMS) (Patient not taking: Reported on 11/30/2022), Disp: 90 capsule, Rfl: 0  •  ondansetron (ZOFRAN) 4 mg tablet, Take 1 tablet (4 mg total) by mouth every 8 (eight) hours as needed for nausea or vomiting (Patient not taking: Reported on 11/30/2022), Disp: 20 tablet, Rfl: 0  No Known Allergies    Labs:  No results displayed because visit has over 200 results        Appointment on 10/26/2022   Component Date Value   • Cholesterol 10/26/2022 132    • Triglycerides 10/26/2022 75    • HDL, Direct 10/26/2022 67    • LDL Calculated 10/26/2022 50    • Non-HDL-Chol (CHOL-HDL) 10/26/2022 65    • Hemoglobin A1C 10/26/2022 5 0    • EAG 10/26/2022 97    • WBC 10/26/2022 3 64 (L)    • RBC 10/26/2022 4 80    • Hemoglobin 10/26/2022 13 7    • Hematocrit 10/26/2022 43 0    • MCV 10/26/2022 90    • MCH 10/26/2022 28 5    • MCHC 10/26/2022 31 9    • RDW 10/26/2022 15 3 (H)    • Platelets 91/91/9993 126 (L)    • MPV 10/26/2022 12 1    • Sodium 10/26/2022 139    • Potassium 10/26/2022 4 7    • Chloride 10/26/2022 102    • CO2 10/26/2022 26    • ANION GAP 10/26/2022 11    • BUN 10/26/2022 12    • Creatinine 10/26/2022 1 26    • Glucose, Fasting 10/26/2022 108 (H)    • Calcium 10/26/2022 9 0    • AST 10/26/2022 30    • ALT 10/26/2022 31    • Alkaline Phosphatase 10/26/2022 54    • Total Protein 10/26/2022 8 4    • Albumin 10/26/2022 3 7    • Total Bilirubin 10/26/2022 1 32 (H)    • eGFR 10/26/2022 52    • Magnesium 10/26/2022 2 2      Imaging: Cardiac EP device report    Result Date: 11/18/2022  Narrative: SJM SINGLE ICD/NOT MRI CONDITIONAL DEVICE INTERROGATED IN THE [place] OFFICE: BATTERY VOLTAGE ADEQUATE (1 8 YRS)   <1% ALL LEAD PARAMETERS WITHIN NORMAL LIMITS  NO SIGNIFICANT HIGH RATE EPISODES  CORVUE IMPEDANCE MONITORING WITHIN NORMAL LIMITS  NO PROGRAMMING CHANGES MADE TO DEVICE PARAMETERS  NORMAL DEVICE FUNCTION  AM/CH       Review of Systems:  Review of Systems    Physical Exam:  He is thin but healthy appearing  Lungs are clear  He has grade 2 short systolic ejection murmur at the apex  Rhythm is regular  Pressing around the defibrillator does cause some discomfort  There is no sign of infection  Discussion/Summary:    1  Chest pain probably related to AICD device  2  Stable coronary artery disease  3  Paroxysmal atrial fibrillation but generally in sinus rhythm    Recommendations:    1  Patient has an appointment on December 23rd with EP  2  Continue current medications  3   Return 6 months        Diana Ortiz MD

## 2022-12-20 DIAGNOSIS — Z00.00 ENCOUNTER FOR GENERAL ADULT MEDICAL EXAMINATION WITHOUT ABNORMAL FINDINGS: ICD-10-CM

## 2022-12-20 RX ORDER — MULTIVITAMIN WITH FOLIC ACID 400 MCG
TABLET ORAL
Qty: 90 TABLET | Refills: 1 | Status: SHIPPED | OUTPATIENT
Start: 2022-12-20

## 2022-12-27 DIAGNOSIS — I50.20 SYSTOLIC HEART FAILURE, UNSPECIFIED HF CHRONICITY (HCC): ICD-10-CM

## 2022-12-27 RX ORDER — DAPAGLIFLOZIN 10 MG/1
TABLET, FILM COATED ORAL
Qty: 90 TABLET | Refills: 3 | Status: SHIPPED | OUTPATIENT
Start: 2022-12-27

## 2023-02-01 ENCOUNTER — OFFICE VISIT (OUTPATIENT)
Dept: FAMILY MEDICINE CLINIC | Facility: CLINIC | Age: 83
End: 2023-02-01

## 2023-02-01 ENCOUNTER — HOSPITAL ENCOUNTER (OUTPATIENT)
Dept: RADIOLOGY | Facility: HOSPITAL | Age: 83
Discharge: HOME | End: 2023-02-01

## 2023-02-01 VITALS
TEMPERATURE: 97.7 F | SYSTOLIC BLOOD PRESSURE: 102 MMHG | WEIGHT: 156 LBS | HEART RATE: 81 BPM | DIASTOLIC BLOOD PRESSURE: 68 MMHG | BODY MASS INDEX: 20.02 KG/M2 | HEIGHT: 74 IN | OXYGEN SATURATION: 95 %

## 2023-02-01 DIAGNOSIS — R05.9 COUGH IN ADULT: Primary | ICD-10-CM

## 2023-02-01 DIAGNOSIS — Z00.00 HEALTHCARE MAINTENANCE: ICD-10-CM

## 2023-02-01 DIAGNOSIS — Z00.00 MEDICARE ANNUAL WELLNESS VISIT, SUBSEQUENT: ICD-10-CM

## 2023-02-01 DIAGNOSIS — E46 PROTEIN-CALORIE MALNUTRITION, UNSPECIFIED SEVERITY (HCC): ICD-10-CM

## 2023-02-01 DIAGNOSIS — E78.2 MIXED HYPERLIPIDEMIA: ICD-10-CM

## 2023-02-01 DIAGNOSIS — M79.662 PAIN OF LEFT LOWER LEG: ICD-10-CM

## 2023-02-01 DIAGNOSIS — I48.0 PAROXYSMAL A-FIB (HCC): ICD-10-CM

## 2023-02-01 DIAGNOSIS — R05.9 COUGH IN ADULT: ICD-10-CM

## 2023-02-01 DIAGNOSIS — Z59.9 FINANCIAL DIFFICULTIES: ICD-10-CM

## 2023-02-01 SDOH — ECONOMIC STABILITY - INCOME SECURITY: PROBLEM RELATED TO HOUSING AND ECONOMIC CIRCUMSTANCES, UNSPECIFIED: Z59.9

## 2023-02-01 NOTE — ASSESSMENT & PLAN NOTE
Denies symptoms  To continue on current medications of digoxin, amiodarone, and eliquis  Follow-up with cardiology as scheduled

## 2023-02-01 NOTE — PROGRESS NOTES
Assessment and Plan:     Problem List Items Addressed This Visit        Cardiovascular and Mediastinum    Paroxysmal A-fib ([facility] )     Denies symptoms  To continue on current medications of digoxin, amiodarone, and eliquis  Follow-up with cardiology as scheduled  Other    Protein-calorie malnutrition, unspecified severity ([facility] )     Malnutrition Findings: Has not decreased in weight  Began taking Ensure shakes 3 times daily  BMI Findings: Body mass index is 20 03 kg/m²  Mixed hyperlipidemia     Will obtain repeat labs  To continue on statin  Relevant Orders    Lipid panel   Other Visit Diagnoses     Cough in adult    -  Primary    Will obtain x-ray to r/o infectious causes  Advised humidified air, tea with honey, Vicks at night  Patient refusing prescription medication at this time    Relevant Orders    XR chest pa & lateral    Pain of left lower leg        Advised heat therapy, stretches, increased hydration  Refusing prescription medication at this time, advised can try OTC salon-pas or lidocaine pathces/Fito-Quintero    Medicare annual wellness visit, subsequent        Financial difficulties        Relevant Orders    Ambulatory referral to social work care management program    Healthcare maintenance        Relevant Orders    CBC and Platelet    Comprehensive metabolic panel    Lipid panel    HEMOGLOBIN A1C W/ EAG ESTIMATION           Preventive health issues were discussed with patient, and age appropriate screening tests were ordered as noted in patient's After Visit Summary  Personalized health advice and appropriate referrals for health education or preventive services given if needed, as noted in patient's After Visit Summary  History of Present Illness:     Patient presents for a Medicare Wellness Visit    Patient presents to the office for Medicare Wellness Visit  Continues to follow-up with cardiology    Was to have pacemaker interrogated back in December, but patient canceled appointment due to weather conditions  Does have an appointment later this month  Patient denies chest pain, palpitations, syncope, dizziness  Patient with c/o cough that began 3 weeks ago with production of clear mucous  Patient states he was getting over a cold, still has cough  Also states he has had episodes of diaphoresis in the past 3 weeks sleeping  Denies fever, chills, weakness, shortness of breath  She denies congestion, sore throat, ear pain, recent sick contacts  Also notes left lower leg pain that began a week ago  Denies any recent trauma or injury  Patient notes pain is mainly located in the left calf, but also occurs to front part of left lower leg  Patient notes pain is better upon ambulating, notes pain is worse at night  She denies redness or swelling to leg  Denies numbness or tingling, or inability to bear weight on left lower leg  Patient Care Team:  Kina Clinton as PCP - General (Family Medicine)     Review of Systems:     Review of Systems   Constitutional: Positive for diaphoresis  Negative for activity change, appetite change, chills, fatigue and unexpected weight change  HENT: Negative for congestion, ear pain, postnasal drip, rhinorrhea, sinus pressure, sinus pain, sore throat and trouble swallowing  Eyes: Negative for photophobia and visual disturbance  Respiratory: Positive for cough  Negative for chest tightness, shortness of breath and wheezing  Cardiovascular: Negative for chest pain, palpitations and leg swelling  Gastrointestinal: Negative for abdominal pain, blood in stool, constipation, diarrhea, nausea and vomiting  Endocrine: Negative for polydipsia, polyphagia and polyuria  Genitourinary: Negative for decreased urine volume, dysuria, frequency, hematuria and urgency  Musculoskeletal: Positive for arthralgias (left lower leg)   Negative for gait problem, joint swelling and myalgias  Skin: Negative for color change and rash  Neurological: Negative for weakness, light-headedness, numbness and headaches  Hematological: Negative for adenopathy  Does not bruise/bleed easily  Psychiatric/Behavioral: Negative for confusion and dysphoric mood  The patient is not nervous/anxious  Problem List:     Patient Active Problem List   Diagnosis   • Protein-calorie malnutrition, unspecified severity ([facility] )   • Coronary artery disease involving native heart without angina pectoris   • Paroxysmal A-fib (HCC)   • Mixed hyperlipidemia   • AICD (automatic cardioverter/defibrillator) present   • Chronic diastolic congestive heart failure (HCC)   • Gastroesophageal reflux disease   • Chest pain   • Anemia in other chronic diseases classified elsewhere   • Myocardiopathy [facility])      Past Medical and Surgical History:     Past Medical History:   Diagnosis Date   • CHF (congestive heart failure) ([facility] )    • Hyperlipidemia    • Hypertension    • MI (myocardial infarction) ([facility] )    • Presence of cardiac defibrillator    • Prostate disorder      Past Surgical History:   Procedure Laterality Date   • CORONARY ANGIOPLASTY WITH STENT PLACEMENT        Family History:     History reviewed  No pertinent family history  Social History:     Social History     Socioeconomic History   • Marital status:       Spouse name: None   • Number of children: None   • Years of education: None   • Highest education level: None   Occupational History   • None   Tobacco Use   • Smoking status: Never   • Smokeless tobacco: Never   Vaping Use   • Vaping Use: Never used   Substance and Sexual Activity   • Alcohol use: Not Currently   • Drug use: Never   • Sexual activity: None   Other Topics Concern   • None   Social History Narrative   • None     Social Determinants of Health     Financial Resource Strain: Medium Risk   • Difficulty of Paying Living Expenses: Somewhat hard   Food Insecurity: Not on file Transportation Needs: No Transportation Needs   • Lack of Transportation (Medical): No   • Lack of Transportation (Non-Medical): No   Physical Activity: Not on file   Stress: Not on file   Social Connections: Not on file   Intimate Partner Violence: Not on file   Housing Stability: Not on file      Medications and Allergies:     Current Outpatient Medications   Medication Sig Dispense Refill   • amiodarone 200 mg tablet TAKE 1 TABLET BY MOUTH EVERY DAY 90 tablet 3   • apixaban (ELIQUIS) 5 mg Take 1 tablet (5 mg total) by mouth 2 (two) times a day 90 tablet 3   • aspirin (ECOTRIN LOW STRENGTH) 81 mg EC tablet Take 81 mg by mouth daily     • atorvastatin (LIPITOR) 20 mg tablet Take 1 tablet (20 mg total) by mouth daily (Patient taking differently: Take 20 mg by mouth daily at bedtime) 90 tablet 3   • digoxin (LANOXIN) 0 125 mg tablet Take 1 tablet (125 mcg total) by mouth daily 90 tablet 3   • Farxiga 10 MG tablet TAKE 1 TABLET BY MOUTH EVERY DAY 90 tablet 3   • finasteride (PROSCAR) 5 mg tablet Take 1 tablet (5 mg total) by mouth daily 90 tablet 3   • Multiple Vitamin (Daily-Misael Multivitamin) TABS TAKE 1 TABLET BY MOUTH EVERY DAY 90 tablet 1   • sacubitril-valsartan (Entresto) 49-51 MG TABS Take 1 tablet by mouth 2 (two) times a day 180 tablet 3   • tamsulosin (FLOMAX) 0 4 mg Take 1 capsule (0 4 mg total) by mouth daily with dinner 90 capsule 3   • hydrocortisone 2 5 % cream APPLY TO AFFECTED AREA TWICE A DAY (Patient not taking: Reported on 11/30/2022) 28 35 g 0     No current facility-administered medications for this visit  No Known Allergies   Immunizations:     Immunization History   Administered Date(s) Administered   • COVID-19 PFIZER VACCINE 0 3 ML IM 01/14/2022      Health Maintenance: There are no preventive care reminders to display for this patient        Topic Date Due   • Pneumococcal Vaccine: 65+ Years (1 - PCV) Never done   • COVID-19 Vaccine (2 - Pfizer series) 02/04/2022   • Influenza Vaccine (1) Never done      Medicare Screening Tests and Risk Assessments:     Rose Butt is here for his Subsequent Wellness visit  Health Risk Assessment:   Patient rates overall health as fair  Patient feels that their physical health rating is slightly worse  Patient is satisfied with their life  Eyesight was rated as same  Hearing was rated as same  Patient feels that their emotional and mental health rating is same  Patients states they are never, rarely angry  Patient states they are sometimes unusually tired/fatigued  Pain experienced in the last 7 days has been some  Patient's pain rating has been 8/10  Patient states that he has experienced no weight loss or gain in last 6 months  Fall Risk Screening: In the past year, patient has experienced: no history of falling in past year      Home Safety:  Patient does not have trouble with stairs inside or outside of their home  Patient has working smoke alarms and has working carbon monoxide detector  Home safety hazards include: none  Nutrition:   Current diet is Regular  Medications:   Patient is currently taking over-the-counter supplements  OTC medications include: see medication list  Patient is able to manage medications  Activities of Daily Living (ADLs)/Instrumental Activities of Daily Living (IADLs):   Walk and transfer into and out of bed and chair?: Yes  Dress and groom yourself?: Yes    Bathe or shower yourself?: Yes    Feed yourself? Yes  Do your laundry/housekeeping?: Yes  Manage your money, pay your bills and track your expenses?: Yes  Make your own meals?: Yes    Do your own shopping?: Yes    ADL comments: Lives with daughter    Drives to appointments and to get food    Previous Hospitalizations:   Any hospitalizations or ED visits within the last 12 months?: Yes    How many hospitalizations have you had in the last year?: 1-2    Advance Care Planning:   Living will: No      PREVENTIVE SCREENINGS      Cardiovascular Screening: General: Screening Not Indicated and History Lipid Disorder      Diabetes Screening:     General: Screening Current      Prostate Cancer Screening:    General: Screening Not Indicated      Lung Cancer Screening:     General: Screening Not Indicated    Screening, Brief Intervention, and Referral to Treatment (SBIRT)    Screening  Typical number of drinks in a day: 0  Typical number of drinks in a week: 0  Interpretation: Low risk drinking behavior  Single Item Drug Screening:  How often have you used an illegal drug (including marijuana) or a prescription medication for non-medical reasons in the past year? never    Single Item Drug Screen Score: 0  Interpretation: Negative screen for possible drug use disorder    Other Counseling Topics:   Car/seat belt/driving safety, skin self-exam, sunscreen and calcium and vitamin D intake and regular weightbearing exercise  No results found  Physical Exam:     /68 (BP Location: Left arm, Patient Position: Sitting, Cuff Size: Standard)   Pulse 81   Temp 97 7 °F (36 5 °C) (Tympanic)   Ht 6' 2" (1 88 m)   Wt 70 8 kg (156 lb)   SpO2 95%   BMI 20 03 kg/m²     Physical Exam  Vitals reviewed  Constitutional:       General: He is not in acute distress  Appearance: Normal appearance  He is not ill-appearing  HENT:      Head: Normocephalic and atraumatic  Right Ear: Tympanic membrane, ear canal and external ear normal       Left Ear: Tympanic membrane, ear canal and external ear normal       Nose: Nose normal       Mouth/Throat:      Mouth: Mucous membranes are moist       Pharynx: Oropharynx is clear  Eyes:      Conjunctiva/sclera: Conjunctivae normal       Pupils: Pupils are equal, round, and reactive to light  Neck:      Vascular: No carotid bruit  Cardiovascular:      Rate and Rhythm: Normal rate and regular rhythm  Pulses: Normal pulses  Heart sounds: Murmur heard  Systolic murmur is present    Pulmonary:      Effort: Pulmonary effort is normal       Breath sounds: Normal breath sounds  No stridor  No decreased breath sounds, wheezing or rhonchi  Abdominal:      General: Bowel sounds are normal       Palpations: Abdomen is soft  Musculoskeletal:         General: Normal range of motion  Cervical back: Normal range of motion and neck supple  Right lower leg: No edema  Left lower leg: No swelling, tenderness or bony tenderness  No edema  Skin:     General: Skin is warm and dry  Capillary Refill: Capillary refill takes less than 2 seconds  Neurological:      General: No focal deficit present  Mental Status: He is alert and oriented to person, place, and time     Psychiatric:         Mood and Affect: Mood normal          Behavior: Behavior normal           OSKAR Jain

## 2023-02-01 NOTE — ASSESSMENT & PLAN NOTE
Malnutrition Findings: Has not decreased in weight  Began taking Ensure shakes 3 times daily  BMI Findings: Body mass index is 20 03 kg/m²

## 2023-02-01 NOTE — PATIENT INSTRUCTIONS
Acute Cough   AMBULATORY CARE:   An acute cough  can last up to 3 weeks  Common causes of an acute cough include a cold, allergies, or a lung infection  Seek care immediately if:   You have trouble breathing or feel short of breath  You cough up blood, or you see blood in your mucus  You faint or feel weak or dizzy  You have chest pain when you cough or take a deep breath  You have new wheezing  Contact your healthcare provider if:   You have a fever  Your cough lasts longer than 4 weeks  Your symptoms do not improve with treatment  You have questions or concerns about your condition or care  Treatment:  An acute cough usually goes away on its own  Ask your healthcare provider about medicines you can take to decrease your cough  You may need medicine to stop the cough, decrease swelling in your airways, or help open your airways  Medicine may also be given to help you cough up mucus  If you have an infection caused by bacteria, you may need antibiotics  Manage your symptoms:   Do not smoke and stay away from others who smoke  Nicotine and other chemicals in cigarettes and cigars can cause lung damage and make your cough worse  Ask your healthcare provider for information if you currently smoke and need help to quit  E-cigarettes or smokeless tobacco still contain nicotine  Talk to your healthcare provider before you use these products  Drink extra liquids as directed  Liquids will help thin and loosen mucus so you can cough it up  Liquids will also help prevent dehydration  Examples of good liquids to drink include water, fruit juice, and broth  Do not drink liquids that contain caffeine  Caffeine can increase your risk for dehydration  Ask your healthcare provider how much liquid to drink each day  Rest as directed  Do not do activities that make your cough worse, such as exercise  Use a humidifier or vaporizer    Use a cool mist humidifier or a vaporizer to increase air moisture in your home  This may make it easier for you to breathe and help decrease your cough  Eat 2 to 5 mL of honey 2 times each day  Honey can help thin mucus and decrease your cough  Use cough drops or lozenges  These can help decrease throat irritation and your cough  Follow up with your healthcare provider as directed:  Write down your questions so you remember to ask them during your visits  © Copyright [employer] 2022 Information is for End User's use only and may not be sold, redistributed or otherwise used for commercial purposes  All illustrations and images included in CareNotes® are the copyrighted property of [employer]  or [address] [place], [place] [address]jj francisco   The above information is an  only  It is not intended as medical advice for individual conditions or treatments  Talk to your doctor, nurse or pharmacist before following any medical regimen to see if it is safe and effective for you

## 2023-02-02 ENCOUNTER — APPOINTMENT (OUTPATIENT)
Dept: LAB | Facility: HOSPITAL | Age: 83
End: 2023-02-02

## 2023-02-02 DIAGNOSIS — E78.2 MIXED HYPERLIPIDEMIA: ICD-10-CM

## 2023-02-02 DIAGNOSIS — Z00.00 HEALTHCARE MAINTENANCE: ICD-10-CM

## 2023-02-02 LAB
ALBUMIN SERPL BCP-MCNC: 4 G/DL (ref 3.5–5)
ALP SERPL-CCNC: 49 U/L (ref 46–116)
ALT SERPL W P-5'-P-CCNC: 36 U/L (ref 12–78)
ANION GAP SERPL CALCULATED.3IONS-SCNC: 9 MMOL/L (ref 4–13)
AST SERPL W P-5'-P-CCNC: 29 U/L (ref 5–45)
BILIRUB SERPL-MCNC: 1.11 MG/DL (ref 0.2–1)
BUN SERPL-MCNC: 20 MG/DL (ref 5–25)
CALCIUM SERPL-MCNC: 9.3 MG/DL (ref 8.3–10.1)
CHLORIDE SERPL-SCNC: 104 MMOL/L (ref 96–108)
CHOLEST SERPL-MCNC: 139 MG/DL
CO2 SERPL-SCNC: 23 MMOL/L (ref 21–32)
CREAT SERPL-MCNC: 1.26 MG/DL (ref 0.6–1.3)
ERYTHROCYTE [DISTWIDTH] IN BLOOD BY AUTOMATED COUNT: 14.9 % (ref 11.6–15.1)
EST. AVERAGE GLUCOSE BLD GHB EST-MCNC: 97 MG/DL
GFR SERPL CREATININE-BSD FRML MDRD: 52 ML/MIN/1.73SQ M
GLUCOSE P FAST SERPL-MCNC: 98 MG/DL (ref 65–99)
HBA1C MFR BLD: 5 %
HCT VFR BLD AUTO: 41.8 % (ref 36.5–49.3)
HDLC SERPL-MCNC: 66 MG/DL
HGB BLD-MCNC: 13.2 G/DL (ref 12–17)
LDLC SERPL CALC-MCNC: 59 MG/DL (ref 0–100)
MCH RBC QN AUTO: 28.3 PG (ref 26.8–34.3)
MCHC RBC AUTO-ENTMCNC: 31.6 G/DL (ref 31.4–37.4)
MCV RBC AUTO: 90 FL (ref 82–98)
NONHDLC SERPL-MCNC: 73 MG/DL
PLATELET # BLD AUTO: 148 THOUSANDS/UL (ref 149–390)
PMV BLD AUTO: 12.3 FL (ref 8.9–12.7)
POTASSIUM SERPL-SCNC: 4.3 MMOL/L (ref 3.5–5.3)
PROT SERPL-MCNC: 8.7 G/DL (ref 6.4–8.4)
RBC # BLD AUTO: 4.67 MILLION/UL (ref 3.88–5.62)
SODIUM SERPL-SCNC: 136 MMOL/L (ref 135–147)
TRIGL SERPL-MCNC: 71 MG/DL
WBC # BLD AUTO: 4.11 THOUSAND/UL (ref 4.31–10.16)

## 2023-02-06 ENCOUNTER — PATIENT OUTREACH (OUTPATIENT)
Dept: FAMILY MEDICINE CLINIC | Facility: CLINIC | Age: 83
End: 2023-02-06

## 2023-02-13 ENCOUNTER — PATIENT OUTREACH (OUTPATIENT)
Dept: FAMILY MEDICINE CLINIC | Facility: CLINIC | Age: 83
End: 2023-02-13

## 2023-02-13 NOTE — PROGRESS NOTES
Telephone call placed to patient in response to referral regarding patient reporting having financial concerns  I introduced myself and explained my role  Alex informed me that he resides in his own apartment in his daughters home  He reports being independent with his personal care and his daughter assists him with meals  Arielle Kirkpatrickjairoivory denies being behind on any of his bills but he reports having outstanding hospital bills and large co payments for his medications  [facility] informed me that he receives about $2,200 monthly from   I provided supportive listening and information on community resources  I informed him about the [place] program and I provided him with the contact number  Alex yaw to call and complete an application over the phone  I also discussed the [id number] [facility] [place] program to help with medical co payments and outstanding medical bills  I advised him to contact [employer] and I provided him with their contact number  Arielle Do denies having any other needs that I can assist him with and he reports having adequate food, medications, transportation, housing and all basic necessities  I am closing this referral but I advised Alex to contact me if he needs any further assistance or support and he agrees

## 2023-02-23 ENCOUNTER — CONSULT (OUTPATIENT)
Dept: CARDIOLOGY CLINIC | Facility: CLINIC | Age: 83
End: 2023-02-23

## 2023-02-23 VITALS
WEIGHT: 155.6 LBS | DIASTOLIC BLOOD PRESSURE: 78 MMHG | OXYGEN SATURATION: 95 % | HEART RATE: 82 BPM | SYSTOLIC BLOOD PRESSURE: 118 MMHG | HEIGHT: 74 IN | BODY MASS INDEX: 19.97 KG/M2

## 2023-02-23 DIAGNOSIS — R07.9 CHEST PAIN, UNSPECIFIED: ICD-10-CM

## 2023-02-23 DIAGNOSIS — R94.31 ABNORMAL ECG: ICD-10-CM

## 2023-02-23 DIAGNOSIS — R07.2 PRECORDIAL PAIN: ICD-10-CM

## 2023-02-23 DIAGNOSIS — Z95.810 AICD (AUTOMATIC CARDIOVERTER/DEFIBRILLATOR) PRESENT: ICD-10-CM

## 2023-02-23 NOTE — PROGRESS NOTES
Electrophysiology-Cardiology (EP)   Alex Cervantes 80 y o  male MRN: [id number]  Unit/Bed#:  Encounter: [id number]        IMPRESSION:    1  Single chamber ICD SJM for NICM placed 2013, he has chronic pocket pain when sleeping, feels it moves to his arm pit, went ot ER Nov for this, then saw Dr Lydia Khalil, he has about 1 5 years of battery left  No signs of infection  2  Paroxyswmal afib on Eliqius  3  CM EF now 60^ on GDMT  4  H/o PCI  DISCUSSION:    PLAN:  1  Offered pocket revision early since only 1 5 years of battery left vs just doing that at time of gen change, can move to submusclar and tie down  Other option is since EF improved and no therapies can explant ICD and not reimplant  Referring Physian: No att  providers found    Chief Complain/Reason for Referal: ICD pocket pain  HPI:Alex Cervantes is a 80 y o  Patient Active Problem List    Diagnosis Date Noted   • 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[facility] [address] ) 11/30/2022   • Anemia in other chronic diseases classified elsewhere 10/28/2022   • Chest pain 10/27/2022   • Gastroesophageal reflux disease 09/14/2022   • Coronary artery disease involving native heart without angina pectoris 12/17/2021   • Paroxysmal A-fib ([address] ) 12/17/2021   • Mixed hyperlipidemia 12/17/2021   • AICD (automatic cardioverter/defibrillator) present 12/17/2021   • Chronic diastolic congestive heart failure ([address] ) 12/17/2021   • Protein-calorie malnutrition, unspecified severity ([address] ) 09/17/2021     79 yo male referred for ICD pocket pain in left arm when sleeps on that side, it aches, went to ER for this and then saw Dr Lydia Khalil, referred to EP  ON GMDT and EF is normal  He has prior stent  ICD placed in [place] 2013, pain in for past year  HE has remote checks done here            Past Medical History:   Diagnosis Date   • CHF (congestive heart failure) (HCC)    • Hyperlipidemia    • Hypertension    • MI (myocardial infarction) ([address] )    • Presence of cardiac defibrillator    • Prostate disorder (Not in a hospital admission)      Scheduled Meds:  Continuous Infusions:No current facility-administered medications for this visit  PRN Meds:  No Known Allergies  I reviewed the Home Medication list in the chart  History reviewed  No pertinent family history  Social History     Socioeconomic History   • Marital status:      Spouse name: Not on file   • Number of children: Not on file   • Years of education: Not on file   • Highest education level: Not on file   Occupational History   • Not on file   Tobacco Use   • Smoking status: Never   • Smokeless tobacco: Never   Vaping Use   • Vaping Use: Never used   Substance and Sexual Activity   • Alcohol use: Not Currently   • Drug use: Never   • Sexual activity: Not on file   Other Topics Concern   • Not on file   Social History Narrative   • Not on file     Social Determinants of Health     Financial Resource Strain: Medium Risk   • Difficulty of Paying Living Expenses: Somewhat hard   Food Insecurity: Not on file   Transportation Needs: No Transportation Needs   • Lack of Transportation (Medical): No   • Lack of Transportation (Non-Medical): No   Physical Activity: Not on file   Stress: Not on file   Social Connections: Not on file   Intimate Partner Violence: Not on file   Housing Stability: Not on file       Review of Systems -12 Point ROS reviewed and are negative or noted in chart except for Pertinent Positives Pertaining to Cardiovascular and Respiratory in HPI above  Vitals:    02/23/23 1339   BP: 118/78   Pulse: 82   SpO2: 95%     Vitals:    02/23/23 1339   Weight: 70 6 kg (155 lb 9 6 oz)   [unfilled]      GEN: Now acute distress, Alert and oriented, very thin  HEENT:Head, neck, ears, oral pharynx: Mucus membranes moist, oral pharynx clear, nares clear   External ears normal  EYES: Pupils equal, sclera anicteric, midline, normal conjuctiva  NECK: No JVD, supple, no obvious masses or thryomegaly or goiter  CARDIOVASCULAR: RRR  ICD site has now swelling or redness, he is very thin w little adipose over ICD  LUNGS:No audible wheezing  ABDOMEN: Soft, nondistended, nontender, without obvious organomegaly or ascites  EXTREMITIES/VASCULAR: No edema  Radial pulses intact, pedal pulses difficult to palpate, warm an well perfused  PSYCH: Normal Affect, no overt suicidal ideation, linear speech pattern without evidence of psychosis  NEURO: Grossly intact, moving all extremiteis equal, face symmetric, alert and responsive, no obvious focal defecits  HEME: No bleeding, bruising, petechia, purpura  SKIN: No significant rashes, warm, no diaphoresis or pallor  Lab Results:     CBC with diff:       Invalid input(s):  RBC, TOTALCELLSCOUNTED, SEGS%, GRANS%, LYMPHS%, EOS%, BASO%, ABNEUT, ABGRANS, ABLYMPHS, ABMOMOS, ABEOS, ABBASO      CMP:      Invalid input(s): ALBUMIN      BMP:      Invalid input(s): LABGLOM    BNP:   Results Reviewed     None        No results for input(s): BNP in the last 72 hours  Magnesium:       Coags:       TSH:       Lipid Profile:        EKG NSR w PAC's,  And PVC's  LAFB  And poor R wave progression

## 2023-03-19 ENCOUNTER — APPOINTMENT (EMERGENCY)
Dept: RADIOLOGY | Facility: HOSPITAL | Age: 83
End: 2023-03-19

## 2023-03-19 ENCOUNTER — HOSPITAL ENCOUNTER (INPATIENT)
Facility: HOSPITAL | Age: 83
LOS: 1 days | Discharge: HOME | End: 2023-03-21
Attending: EMERGENCY MEDICINE | Admitting: INTERNAL MEDICINE

## 2023-03-19 DIAGNOSIS — I77.819 AORTIC DILATATION (HCC): ICD-10-CM

## 2023-03-19 DIAGNOSIS — K29.70 GASTRITIS: ICD-10-CM

## 2023-03-19 DIAGNOSIS — R07.9 CHEST PAIN: Primary | ICD-10-CM

## 2023-03-19 DIAGNOSIS — E78.2 MIXED HYPERLIPIDEMIA: ICD-10-CM

## 2023-03-19 LAB
2HR DELTA HS TROPONIN: 3 NG/L
4HR DELTA HS TROPONIN: 4 NG/L
ALBUMIN SERPL BCP-MCNC: 3.6 G/DL (ref 3.5–5)
ALP SERPL-CCNC: 37 U/L (ref 34–104)
ALT SERPL W P-5'-P-CCNC: 32 U/L (ref 7–52)
ANION GAP SERPL CALCULATED.3IONS-SCNC: 6 MMOL/L (ref 4–13)
AST SERPL W P-5'-P-CCNC: 24 U/L (ref 13–39)
ATRIAL RATE: 71 BPM
BASOPHILS # BLD AUTO: 0.03 THOUSANDS/ÂΜL (ref 0–0.1)
BASOPHILS NFR BLD AUTO: 1 % (ref 0–1)
BILIRUB SERPL-MCNC: 1.1 MG/DL (ref 0.2–1)
BNP SERPL-MCNC: 38 PG/ML (ref 0–100)
BUN SERPL-MCNC: 11 MG/DL (ref 5–25)
CALCIUM SERPL-MCNC: 8.6 MG/DL (ref 8.4–10.2)
CARDIAC TROPONIN I PNL SERPL HS: 11 NG/L
CARDIAC TROPONIN I PNL SERPL HS: 12 NG/L
CARDIAC TROPONIN I PNL SERPL HS: 8 NG/L
CHLORIDE SERPL-SCNC: 107 MMOL/L (ref 96–108)
CO2 SERPL-SCNC: 24 MMOL/L (ref 21–32)
CREAT SERPL-MCNC: 1.04 MG/DL (ref 0.6–1.3)
EOSINOPHIL # BLD AUTO: 0.02 THOUSAND/ÂΜL (ref 0–0.61)
EOSINOPHIL NFR BLD AUTO: 1 % (ref 0–6)
ERYTHROCYTE [DISTWIDTH] IN BLOOD BY AUTOMATED COUNT: 15.7 % (ref 11.6–15.1)
GFR SERPL CREATININE-BSD FRML MDRD: 66 ML/MIN/1.73SQ M
GLUCOSE SERPL-MCNC: 111 MG/DL (ref 65–140)
GLUCOSE SERPL-MCNC: 112 MG/DL (ref 65–140)
GLUCOSE SERPL-MCNC: 90 MG/DL (ref 65–140)
HCT VFR BLD AUTO: 38.2 % (ref 36.5–49.3)
HGB BLD-MCNC: 12 G/DL (ref 12–17)
IMM GRANULOCYTES # BLD AUTO: 0.01 THOUSAND/UL (ref 0–0.2)
IMM GRANULOCYTES NFR BLD AUTO: 0 % (ref 0–2)
LYMPHOCYTES # BLD AUTO: 1.61 THOUSANDS/ÂΜL (ref 0.6–4.47)
LYMPHOCYTES NFR BLD AUTO: 41 % (ref 14–44)
MCH RBC QN AUTO: 28 PG (ref 26.8–34.3)
MCHC RBC AUTO-ENTMCNC: 31.4 G/DL (ref 31.4–37.4)
MCV RBC AUTO: 89 FL (ref 82–98)
MONOCYTES # BLD AUTO: 0.3 THOUSAND/ÂΜL (ref 0.17–1.22)
MONOCYTES NFR BLD AUTO: 8 % (ref 4–12)
NEUTROPHILS # BLD AUTO: 1.99 THOUSANDS/ÂΜL (ref 1.85–7.62)
NEUTS SEG NFR BLD AUTO: 49 % (ref 43–75)
NRBC BLD AUTO-RTO: 0 /100 WBCS
P AXIS: 57 DEGREES
PLATELET # BLD AUTO: 132 THOUSANDS/UL (ref 149–390)
PMV BLD AUTO: 10.9 FL (ref 8.9–12.7)
POTASSIUM SERPL-SCNC: 4.2 MMOL/L (ref 3.5–5.3)
PR INTERVAL: 134 MS
PROT SERPL-MCNC: 6.8 G/DL (ref 6.4–8.4)
QRS AXIS: -62 DEGREES
QRSD INTERVAL: 98 MS
QT INTERVAL: 404 MS
QTC INTERVAL: 439 MS
RBC # BLD AUTO: 4.28 MILLION/UL (ref 3.88–5.62)
SODIUM SERPL-SCNC: 137 MMOL/L (ref 135–147)
T WAVE AXIS: 82 DEGREES
VENTRICULAR RATE: 71 BPM
WBC # BLD AUTO: 3.96 THOUSAND/UL (ref 4.31–10.16)

## 2023-03-19 RX ORDER — ATORVASTATIN CALCIUM 20 MG/1
20 TABLET, FILM COATED ORAL
Status: DISCONTINUED | OUTPATIENT
Start: 2023-03-19 | End: 2023-03-21 | Stop reason: HOSPADM

## 2023-03-19 RX ORDER — ONDANSETRON 2 MG/ML
4 INJECTION INTRAMUSCULAR; INTRAVENOUS EVERY 4 HOURS PRN
Status: DISCONTINUED | OUTPATIENT
Start: 2023-03-19 | End: 2023-03-21 | Stop reason: HOSPADM

## 2023-03-19 RX ORDER — NITROGLYCERIN 0.4 MG/1
0.4 TABLET SUBLINGUAL ONCE
Status: COMPLETED | OUTPATIENT
Start: 2023-03-19 | End: 2023-03-19

## 2023-03-19 RX ORDER — TAMSULOSIN HYDROCHLORIDE 0.4 MG/1
0.4 CAPSULE ORAL
Status: DISCONTINUED | OUTPATIENT
Start: 2023-03-19 | End: 2023-03-21 | Stop reason: HOSPADM

## 2023-03-19 RX ORDER — INSULIN LISPRO 100 [IU]/ML
1-5 INJECTION, SOLUTION INTRAVENOUS; SUBCUTANEOUS
Status: DISCONTINUED | OUTPATIENT
Start: 2023-03-19 | End: 2023-03-20

## 2023-03-19 RX ORDER — FINASTERIDE 5 MG/1
5 TABLET, FILM COATED ORAL DAILY
Status: DISCONTINUED | OUTPATIENT
Start: 2023-03-20 | End: 2023-03-21 | Stop reason: HOSPADM

## 2023-03-19 RX ORDER — DIGOXIN 125 MCG
125 TABLET ORAL DAILY
Status: DISCONTINUED | OUTPATIENT
Start: 2023-03-20 | End: 2023-03-21 | Stop reason: HOSPADM

## 2023-03-19 RX ORDER — ASPIRIN 81 MG/1
81 TABLET ORAL DAILY
Status: DISCONTINUED | OUTPATIENT
Start: 2023-03-20 | End: 2023-03-21 | Stop reason: HOSPADM

## 2023-03-19 RX ORDER — PANTOPRAZOLE SODIUM 40 MG/10ML
40 INJECTION, POWDER, LYOPHILIZED, FOR SOLUTION INTRAVENOUS
Status: DISCONTINUED | OUTPATIENT
Start: 2023-03-19 | End: 2023-03-21 | Stop reason: HOSPADM

## 2023-03-19 RX ORDER — AMIODARONE HYDROCHLORIDE 200 MG/1
200 TABLET ORAL DAILY
Status: DISCONTINUED | OUTPATIENT
Start: 2023-03-20 | End: 2023-03-21 | Stop reason: HOSPADM

## 2023-03-19 RX ADMIN — SACUBITRIL AND VALSARTAN 1 TABLET: 49; 51 TABLET, FILM COATED ORAL at 18:56

## 2023-03-19 RX ADMIN — PANTOPRAZOLE SODIUM 40 MG: 40 INJECTION, POWDER, FOR SOLUTION INTRAVENOUS at 22:52

## 2023-03-19 RX ADMIN — NITROGLYCERIN 0.4 MG: 0.4 TABLET SUBLINGUAL at 14:21

## 2023-03-19 RX ADMIN — ATORVASTATIN CALCIUM 20 MG: 20 TABLET, FILM COATED ORAL at 21:13

## 2023-03-19 RX ADMIN — TAMSULOSIN HYDROCHLORIDE 0.4 MG: 0.4 CAPSULE ORAL at 18:55

## 2023-03-19 RX ADMIN — APIXABAN 5 MG: 5 TABLET, FILM COATED ORAL at 18:56

## 2023-03-19 NOTE — ED NOTES
Patient called RN into room, states his chest is tightening up, similar to his previous heart attacks  EKG preformed and given to provider        Rudi Houston RN  03/19/23 1010

## 2023-03-19 NOTE — PLAN OF CARE
Problem: PAIN - ADULT  Goal: Verbalizes/displays adequate comfort level or baseline comfort level  Description: Interventions:  - Encourage patient to monitor pain and request assistance  - Assess pain using appropriate pain scale  - Administer analgesics based on type and severity of pain and evaluate response  - Implement non-pharmacological measures as appropriate and evaluate response  - Consider cultural and social influences on pain and pain management  - Notify physician/advanced practitioner if interventions unsuccessful or patient reports new pain  Outcome: Progressing     Problem: INFECTION - ADULT  Goal: Absence or prevention of progression during hospitalization  Description: INTERVENTIONS:  - Assess and monitor for signs and symptoms of infection  - Monitor lab/diagnostic results  - Monitor all insertion sites, i e  indwelling lines, tubes, and drains  - Monitor endotracheal if appropriate and nasal secretions for changes in amount and color  - [place] appropriate cooling/warming therapies per order  - Administer medications as ordered  - Instruct and encourage patient and family to use good hand hygiene technique  - Identify and instruct in appropriate isolation precautions for identified infection/condition  Outcome: Progressing  Goal: Absence of fever/infection during neutropenic period  Description: INTERVENTIONS:  - Monitor WBC    Outcome: Progressing     Problem: SAFETY ADULT  Goal: Patient will remain free of falls  Description: INTERVENTIONS:  - Educate patient/family on patient safety including physical limitations  - Instruct patient to call for assistance with activity   - Consult OT/PT to assist with strengthening/mobility   - Keep Call bell within reach  - Keep bed low and locked with side rails adjusted as appropriate  - Keep care items and personal belongings within reach  - Initiate and maintain comfort rounds  - Make Fall Risk Sign visible to staff  - Offer Toileting every    Hours, in advance of need  - Initiate/Maintain   alarm  - Obtain necessary fall risk management equipment:     - Apply yellow socks and bracelet for high fall risk patients  - Consider moving patient to room near nurses station  Outcome: Progressing  Goal: Maintain or return to baseline ADL function  Description: INTERVENTIONS:  -  Assess patient's ability to carry out ADLs; assess patient's baseline for ADL function and identify physical deficits which impact ability to perform ADLs (bathing, care of mouth/teeth, toileting, grooming, dressing, etc )  - Assess/evaluate cause of self-care deficits   - Assess range of motion  - Assess patient's mobility; develop plan if impaired  - Assess patient's need for assistive devices and provide as appropriate  - Encourage maximum independence but intervene and supervise when necessary  - Involve family in performance of ADLs  - Assess for home care needs following discharge   - Consider OT consult to assist with ADL evaluation and planning for discharge  - Provide patient education as appropriate  Outcome: Progressing  Goal: Maintains/Returns to pre admission functional level  Description: INTERVENTIONS:  - Perform BMAT or MOVE assessment daily    - Set and communicate daily mobility goal to care team and patient/family/caregiver  - Collaborate with rehabilitation services on mobility goals if consulted  - Perform Range of Motion    times a day  - Reposition patient every    hours    - Dangle patient    times a day  - Stand patient    times a day  - Ambulate patient    times a day  - Out of bed to chair    times a day   - Out of bed for meals  times a day  - Out of bed for toileting  - Record patient progress and toleration of activity level   Outcome: Progressing     Problem: DISCHARGE PLANNING  Goal: Discharge to home or other facility with appropriate resources  Description: INTERVENTIONS:  - Identify barriers to discharge w/patient and caregiver  - Arrange for needed discharge resources and transportation as appropriate  - Identify discharge learning needs (meds, wound care, etc )  - Arrange for interpretive services to assist at discharge as needed  - Refer to Case Management Department for coordinating discharge planning if the patient needs post-hospital services based on physician/advanced practitioner order or complex needs related to functional status, cognitive ability, or social support system  Outcome: Progressing     Problem: Knowledge Deficit  Goal: Patient/family/caregiver demonstrates understanding of disease process, treatment plan, medications, and discharge instructions  Description: Complete learning assessment and assess knowledge base    Interventions:  - Provide teaching at level of understanding  - Provide teaching via preferred learning methods  Outcome: Progressing

## 2023-03-19 NOTE — ED PROVIDER NOTES
History  Chief Complaint   Patient presents with   • Chest Pain     Chest pain that travels to the left back with SOB that started last night  Ems states O2 was in the low 80's on arrival  Given 1 nitro and 325 of ASA  80-year-old male, chest pain, prior MI, feels like prior MI  Associated shortness of breath, nausea, diaphoresis  He has a chest pain associated with shortness of breath that started last night  Pain radiates to the back  EMS noted pulse oxygenation of 80% on arrival   He improved to 100% with supplemental oxygen  Given 1 nitro and 325 of aspirin prehospital           Prior to Admission Medications   Prescriptions Last Dose Informant Patient Reported? Taking?    Farxiga 10 MG tablet 3/18/2023  No Yes   Sig: TAKE 1 TABLET BY MOUTH EVERY DAY   Patient taking differently: Take 10 mg by mouth every other day   Multiple Vitamin (Daily-Misael Multivitamin) TABS 3/19/2023  No Yes   Sig: TAKE 1 TABLET BY MOUTH EVERY DAY   amiodarone 200 mg tablet 3/19/2023  No Yes   Sig: TAKE 1 TABLET BY MOUTH EVERY DAY   apixaban (ELIQUIS) 5 mg 3/19/2023  No Yes   Sig: Take 1 tablet (5 mg total) by mouth 2 (two) times a day   aspirin (ECOTRIN LOW STRENGTH) 81 mg EC tablet 3/19/2023  Yes Yes   Sig: Take 81 mg by mouth daily   atorvastatin (LIPITOR) 20 mg tablet 3/18/2023  No Yes   Sig: Take 1 tablet (20 mg total) by mouth daily   Patient taking differently: Take 20 mg by mouth daily at bedtime   digoxin (LANOXIN) 0 125 mg tablet 3/19/2023  No Yes   Sig: Take 1 tablet (125 mcg total) by mouth daily   finasteride (PROSCAR) 5 mg tablet 3/19/2023  No Yes   Sig: Take 1 tablet (5 mg total) by mouth daily   Patient taking differently: Take 5 mg by mouth every other day   hydrocortisone 2 5 % cream Not Taking  No No   Sig: APPLY TO AFFECTED AREA TWICE A DAY   Patient not taking: Reported on 11/30/2022   sacubitril-valsartan (Entresto) 49-51 MG TABS 3/19/2023  No Yes   Sig: Take 1 tablet by mouth 2 (two) times a day tamsulosin (FLOMAX) 0 4 mg 3/18/2023  No Yes   Sig: Take 1 capsule (0 4 mg total) by mouth daily with dinner      Facility-Administered Medications: None       Past Medical History:   Diagnosis Date   • CHF (congestive heart failure) ([facility])    • Hyperlipidemia    • Hypertension    • MI (myocardial infarction) ([facility] [address] )    • Presence of cardiac defibrillator    • Prostate disorder        Past Surgical History:   Procedure Laterality Date   • CORONARY ANGIOPLASTY WITH STENT PLACEMENT         History reviewed  No pertinent family history  I have reviewed and agree with the history as documented  E-Cigarette/Vaping   • E-Cigarette Use Never User      E-Cigarette/Vaping Substances   • Nicotine No    • THC No    • CBD No      Social History     Tobacco Use   • Smoking status: Never   • Smokeless tobacco: Never   Vaping Use   • Vaping Use: Never used   Substance Use Topics   • Alcohol use: Not Currently   • Drug use: Never       Review of Systems   Constitutional: Positive for diaphoresis  Negative for chills, fatigue and fever  Respiratory: Positive for shortness of breath  Negative for apnea, cough and chest tightness  Cardiovascular: Positive for chest pain  Negative for palpitations and leg swelling  Gastrointestinal: Positive for nausea  Negative for abdominal pain, constipation, diarrhea and vomiting  Genitourinary: Negative for dysuria and flank pain  Musculoskeletal: Positive for back pain  Negative for neck pain  Skin: Negative for color change and rash  Allergic/Immunologic: Negative for immunocompromised state  Neurological: Negative for dizziness, syncope and headaches  Physical Exam  Physical Exam  Vitals reviewed  Constitutional:       General: He is not in acute distress  Appearance: He is well-developed  He is not ill-appearing, toxic-appearing or diaphoretic  HENT:      Head: Normocephalic and atraumatic  Eyes:      General: No scleral icterus          Right eye: No discharge  Left eye: No discharge  Conjunctiva/sclera: Conjunctivae normal       Pupils: Pupils are equal, round, and reactive to light  Neck:      Vascular: No JVD  Cardiovascular:      Rate and Rhythm: Normal rate and regular rhythm  Heart sounds: Normal heart sounds  No murmur heard  No friction rub  No gallop  Pulmonary:      Effort: Pulmonary effort is normal  No respiratory distress  Breath sounds: Normal breath sounds  No decreased breath sounds, wheezing, rhonchi or rales  Chest:      Chest wall: No tenderness  Abdominal:      General: Bowel sounds are normal  There is no distension  Palpations: Abdomen is soft  Tenderness: There is no abdominal tenderness  There is no guarding or rebound  Musculoskeletal:         General: No tenderness or deformity  Normal range of motion  Cervical back: Normal range of motion and neck supple  Right lower leg: No tenderness  No edema  Left lower leg: No tenderness  No edema  Skin:     General: Skin is warm and dry  Coloration: Skin is not pale  Findings: No erythema or rash  Neurological:      Mental Status: He is alert and oriented to person, place, and time  Cranial Nerves: No cranial nerve deficit     Psychiatric:         Behavior: Behavior normal          Vital Signs  ED Triage Vitals   Temperature Pulse Respirations Blood Pressure SpO2   03/19/23 1215 03/19/23 1214 03/19/23 1214 03/19/23 1216 03/19/23 1214   97 6 °F (36 4 °C) 69 20 125/67 95 %      Temp Source Heart Rate Source Patient Position - Orthostatic VS BP Location FiO2 (%)   03/19/23 1215 03/19/23 1902 03/19/23 1214 03/19/23 1214 --   Oral Monitor Sitting Right arm       Pain Score       03/19/23 1846       8           Vitals:    03/20/23 2301 03/21/23 0143 03/21/23 0546 03/21/23 0719   BP: 114/65 118/67 110/64 110/66   Pulse: 59 57 64 62   Patient Position - Orthostatic VS:             Visual Acuity      ED Medications  Medications   nitroglycerin (NITROSTAT) SL tablet 0 4 mg (0 4 mg Sublingual Given 3/19/23 1421)   iohexol (OMNIPAQUE) 350 MG/ML injection (SINGLE-DOSE) 100 mL (100 mL Intravenous Given 3/20/23 1715)   ketorolac (TORADOL) injection 15 mg (15 mg Intravenous Given 3/21/23 0159)       Diagnostic Studies  Results Reviewed     Procedure Component Value Units Date/Time    HS Troponin I 4hr [[id number]]  (Normal) Collected: 03/19/23 1644    Lab Status: Final result Specimen: Blood Updated: 03/19/23 1711     hs TnI 4hr 12 ng/L      Delta 4hr hsTnI 4 ng/L     HS Troponin I 2hr [[id number]]  (Normal) Collected: 03/19/23 1426    Lab Status: Final result Specimen: Blood from Arm, Right Updated: 03/19/23 1454     hs TnI 2hr 11 ng/L      Delta 2hr hsTnI 3 ng/L     B-Type Natriuretic Peptide(BNP) [[id number]]  (Normal) Collected: 03/19/23 1227    Lab Status: Final result Specimen: Blood from Arm, Left Updated: 03/19/23 1307     BNP 38 pg/mL     HS Troponin 0hr (reflex protocol) [[id number]]  (Normal) Collected: 03/19/23 1227    Lab Status: Final result Specimen: Blood from Arm, Left Updated: 03/19/23 1257     hs TnI 0hr 8 ng/L     Comprehensive metabolic panel [[id number]]  (Abnormal) Collected: 03/19/23 1227    Lab Status: Final result Specimen: Blood from Arm, Left Updated: 03/19/23 1248     Sodium 137 mmol/L      Potassium 4 2 mmol/L      Chloride 107 mmol/L      CO2 24 mmol/L      ANION GAP 6 mmol/L      BUN 11 mg/dL      Creatinine 1 04 mg/dL      Glucose 111 mg/dL      Calcium 8 6 mg/dL      AST 24 U/L      ALT 32 U/L      Alkaline Phosphatase 37 U/L      Total Protein 6 8 g/dL      Albumin 3 6 g/dL      Total Bilirubin 1 10 mg/dL      eGFR 66 ml/min/1 73sq m     Narrative:      Jarad guidelines for Chronic Kidney Disease (CKD):   •  Stage 1 with normal or high GFR (GFR > 90 mL/min/1 73 square meters)  •  Stage 2 Mild CKD (GFR = 60-89 mL/min/1 73 square meters)  •  Stage 3A Moderate CKD (GFR = 45-59 mL/min/1 73 square meters)  •  Stage 3B Moderate CKD (GFR = 30-44 mL/min/1 73 square meters)  •  Stage 4 Severe CKD (GFR = 15-29 mL/min/1 73 square meters)  •  Stage 5 End Stage CKD (GFR <15 mL/min/1 73 square meters)  Note: GFR calculation is accurate only with a steady state creatinine    CBC and differential [[id number]]  (Abnormal) Collected: 03/19/23 1227    Lab Status: Final result Specimen: Blood from Arm, Left Updated: 03/19/23 1236     WBC 3 96 Thousand/uL      RBC 4 28 Million/uL      Hemoglobin 12 0 g/dL      Hematocrit 38 2 %      MCV 89 fL      MCH 28 0 pg      MCHC 31 4 g/dL      RDW 15 7 %      MPV 10 9 fL      Platelets 466 Thousands/uL      nRBC 0 /100 WBCs      Neutrophils Relative 49 %      Immat GRANS % 0 %      Lymphocytes Relative 41 %      Monocytes Relative 8 %      Eosinophils Relative 1 %      Basophils Relative 1 %      Neutrophils Absolute 1 99 Thousands/µL      Immature Grans Absolute 0 01 Thousand/uL      Lymphocytes Absolute 1 61 Thousands/µL      Monocytes Absolute 0 30 Thousand/µL      Eosinophils Absolute 0 02 Thousand/µL      Basophils Absolute 0 03 Thousands/µL                  CT chest abdomen pelvis w contrast   Final Result by Isac Dubose MD (03/21 1107)   No acute inflammatory stranding      No evidence of pancreatitis, mediastinitis      Small segment of the rectal thickening, can be evaluated with colonoscopy performed on nonemergent basis      Mild gastric wall thickening, evaluate for gastritis      Incidentally noted is a 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   Surveillance suggested   Enlarged prostate   The study was marked in EPIC for significant notification  Workstation performed: [id number]         X-ray chest 1 view portable   ED Interpretation by Esha Siddiqui DO (03/19 1320)   No acute cardiopulmonary abnormalities      Final Result by Ellis Aquino MD (03/20 1029)      No acute cardiopulmonary disease  Workstation performed: [id number]                    Procedures  Procedures         ED Course  ED Course as of 03/25/23 5233   Bro Mancilla Mar 19, 2023   1223 Patient states that chest pain is improved since receiving nitro in the ambulance  1319 hs TnI 0hr: 8             HEART Risk Score    Flowsheet Row Most Recent Value   Heart Score Risk Calculator    History 2 Filed at: 03/19/2023 1417   ECG 1 Filed at: 03/19/2023 1417   Age 2 Filed at: 03/19/2023 1417   Risk Factors 2 Filed at: 03/19/2023 1417   Troponin 0 Filed at: 03/19/2023 1417   HEART Score 7 Filed at: 03/19/2023 1417                          BOWEN Risk Score    Flowsheet Row Most Recent Value   Age >= 72 1 Filed at: 03/19/2023 1604   Known CAD (stenosis >= 50%) 1 Filed at: 03/19/2023 1604   Recent (<=24 hrs) Service Angina 1 Filed at: 03/19/2023 1604   ST Deviation >= 0 5 mm 0 Filed at: 03/19/2023 1604   3+ CAD Risk Factors (FHx, HTN, HLP, DM, Smoker) 1 Filed at: 03/19/2023 1604   Aspirin Use Past 7 Days 1 Filed at: 03/19/2023 1604   Elevated Cardiac Markers 0 Filed at: 03/19/2023 1604   BOWEN Risk Score (Calculated) 5 Filed at: 03/19/2023 1604                  Medical Decision Making  Chest pain that feels like prior MI  Hypoxia  Heart score of 7  Patient is admitted for cardiac observation  Amount and/or Complexity of Data Reviewed  Labs: ordered  Decision-making details documented in ED Course  Radiology: ordered and independent interpretation performed  Risk  Prescription drug management  Decision regarding hospitalization            Disposition  Final diagnoses:   Chest pain     Time reflects when diagnosis was documented in both MDM as applicable and the Disposition within this note     Time User Action Codes Description Comment    3/19/2023  3:47 PM Tamy Ar Add [R07 9] Chest pain     3/21/2023  1:32 PM Dillon Anand Add [E78 2] Mixed hyperlipidemia     3/21/2023  1:33 PM Dillon Anand Add [K29 70] Gastritis     3/21/2023  1:47 PM Oswaldo Cohen Ronald Marks Add [N45 282] Aortic dilatation [facility])       ED Disposition     ED Disposition   Admit    Condition   Stable    Date/Time   Sun Mar 19, 2023  3:46 PM    Comment   --         Follow-up Information     Follow up With Specialties Details Why [facility] Schedule an appointment as soon as possible for a visit in 1 week(s)  Eugenie Cronin [address] [facility] [address]  [phone]            Discharge Medication List as of 3/21/2023  2:21 PM      START taking these medications    Details   aluminum-magnesium hydroxide-simethicone (MYLANTA) 4002-4610-091 mg/30 mL suspension Take 30 mL by mouth 3 (three) times a day for 10 days, Starting Tue 3/21/2023, Until Fri 3/31/2023, Normal      pantoprazole (PROTONIX) 40 mg tablet Take 1 tablet (40 mg total) by mouth 2 (two) times a day, Starting Tue 3/21/2023, Until Thu 4/20/2023, Normal         CONTINUE these medications which have CHANGED    Details   atorvastatin (LIPITOR) 20 mg tablet Take 1 tablet (20 mg total) by mouth daily at bedtime, Starting Tue 3/21/2023, No Print         CONTINUE these medications which have NOT CHANGED    Details   amiodarone 200 mg tablet TAKE 1 TABLET BY MOUTH EVERY DAY, Normal      apixaban (ELIQUIS) 5 mg Take 1 tablet (5 mg total) by mouth 2 (two) times a day, Starting Wed 11/30/2022, Normal      aspirin (ECOTRIN LOW STRENGTH) 81 mg EC tablet Take 81 mg by mouth daily, Historical Med      digoxin (LANOXIN) 0 125 mg tablet Take 1 tablet (125 mcg total) by mouth daily, Starting Wed 7/27/2022, Normal      Farxiga 10 MG tablet TAKE 1 TABLET BY MOUTH EVERY DAY, Normal      finasteride (PROSCAR) 5 mg tablet Take 1 tablet (5 mg total) by mouth daily, Starting Mon 5/9/2022, Normal      Multiple Vitamin (Daily-Misael Multivitamin) TABS TAKE 1 TABLET BY MOUTH EVERY DAY, Normal      sacubitril-valsartan (Entresto) 49-51 MG TABS Take 1 tablet by mouth 2 (two) times a day, Starting Wed 11/30/2022, Normal      tamsulosin (FLOMAX) 0 4 mg Take 1 capsule (0 4 mg total) by mouth daily with dinner, Starting Mon 5/9/2022, Normal      hydrocortisone 2 5 % cream APPLY TO AFFECTED AREA TWICE A DAY, Normal             Outpatient Discharge Orders   Ambulatory Referral to Vascular Surgery   Standing Status: Future Standing Exp   Date: 03/21/24      Discharge Diet       PDMP Review       Value Time User    PDMP Reviewed  Yes 3/19/2023  3:58 PM Emanuel Monday, DO          ED Provider  Electronically Signed by           Rhonda Morrow DO  03/25/23 9727

## 2023-03-20 ENCOUNTER — APPOINTMENT (INPATIENT)
Dept: CT IMAGING | Facility: HOSPITAL | Age: 83
End: 2023-03-20

## 2023-03-20 PROBLEM — R10.10 UPPER ABDOMINAL PAIN: Status: ACTIVE | Noted: 2023-03-20

## 2023-03-20 LAB
ALBUMIN SERPL BCP-MCNC: 3.4 G/DL (ref 3.5–5)
ALP SERPL-CCNC: 35 U/L (ref 34–104)
ALT SERPL W P-5'-P-CCNC: 27 U/L (ref 7–52)
ANION GAP SERPL CALCULATED.3IONS-SCNC: 4 MMOL/L (ref 4–13)
AST SERPL W P-5'-P-CCNC: 20 U/L (ref 13–39)
ATRIAL RATE: 60 BPM
BASOPHILS # BLD AUTO: 0.02 THOUSANDS/ÂΜL (ref 0–0.1)
BASOPHILS NFR BLD AUTO: 1 % (ref 0–1)
BILIRUB SERPL-MCNC: 1.01 MG/DL (ref 0.2–1)
BUN SERPL-MCNC: 12 MG/DL (ref 5–25)
CALCIUM ALBUM COR SERPL-MCNC: 9.2 MG/DL (ref 8.3–10.1)
CALCIUM SERPL-MCNC: 8.7 MG/DL (ref 8.4–10.2)
CHLORIDE SERPL-SCNC: 106 MMOL/L (ref 96–108)
CO2 SERPL-SCNC: 25 MMOL/L (ref 21–32)
CREAT SERPL-MCNC: 0.94 MG/DL (ref 0.6–1.3)
EOSINOPHIL # BLD AUTO: 0.04 THOUSAND/ÂΜL (ref 0–0.61)
EOSINOPHIL NFR BLD AUTO: 1 % (ref 0–6)
ERYTHROCYTE [DISTWIDTH] IN BLOOD BY AUTOMATED COUNT: 15.3 % (ref 11.6–15.1)
GFR SERPL CREATININE-BSD FRML MDRD: 75 ML/MIN/1.73SQ M
GLUCOSE P FAST SERPL-MCNC: 83 MG/DL (ref 65–99)
GLUCOSE SERPL-MCNC: 107 MG/DL (ref 65–140)
GLUCOSE SERPL-MCNC: 107 MG/DL (ref 65–140)
GLUCOSE SERPL-MCNC: 82 MG/DL (ref 65–140)
GLUCOSE SERPL-MCNC: 83 MG/DL (ref 65–140)
HCT VFR BLD AUTO: 33.9 % (ref 36.5–49.3)
HGB BLD-MCNC: 10.8 G/DL (ref 12–17)
IMM GRANULOCYTES # BLD AUTO: 0.01 THOUSAND/UL (ref 0–0.2)
IMM GRANULOCYTES NFR BLD AUTO: 0 % (ref 0–2)
LIPASE SERPL-CCNC: 12 U/L (ref 11–82)
LYMPHOCYTES # BLD AUTO: 2.08 THOUSANDS/ÂΜL (ref 0.6–4.47)
LYMPHOCYTES NFR BLD AUTO: 56 % (ref 14–44)
MCH RBC QN AUTO: 27.8 PG (ref 26.8–34.3)
MCHC RBC AUTO-ENTMCNC: 31.9 G/DL (ref 31.4–37.4)
MCV RBC AUTO: 87 FL (ref 82–98)
MONOCYTES # BLD AUTO: 0.34 THOUSAND/ÂΜL (ref 0.17–1.22)
MONOCYTES NFR BLD AUTO: 9 % (ref 4–12)
NEUTROPHILS # BLD AUTO: 1.23 THOUSANDS/ÂΜL (ref 1.85–7.62)
NEUTS SEG NFR BLD AUTO: 33 % (ref 43–75)
NRBC BLD AUTO-RTO: 0 /100 WBCS
P AXIS: 59 DEGREES
PLATELET # BLD AUTO: 137 THOUSANDS/UL (ref 149–390)
PMV BLD AUTO: 11.2 FL (ref 8.9–12.7)
POTASSIUM SERPL-SCNC: 3.9 MMOL/L (ref 3.5–5.3)
PR INTERVAL: 150 MS
PROT SERPL-MCNC: 6.3 G/DL (ref 6.4–8.4)
QRS AXIS: -55 DEGREES
QRSD INTERVAL: 104 MS
QT INTERVAL: 424 MS
QTC INTERVAL: 424 MS
RBC # BLD AUTO: 3.89 MILLION/UL (ref 3.88–5.62)
SODIUM SERPL-SCNC: 135 MMOL/L (ref 135–147)
T WAVE AXIS: 78 DEGREES
VENTRICULAR RATE: 60 BPM
WBC # BLD AUTO: 3.72 THOUSAND/UL (ref 4.31–10.16)

## 2023-03-20 RX ORDER — OXYCODONE HYDROCHLORIDE 5 MG/1
5 TABLET ORAL EVERY 6 HOURS PRN
Status: DISCONTINUED | OUTPATIENT
Start: 2023-03-20 | End: 2023-03-21 | Stop reason: HOSPADM

## 2023-03-20 RX ORDER — ACETAMINOPHEN 325 MG/1
650 TABLET ORAL EVERY 6 HOURS PRN
Status: DISCONTINUED | OUTPATIENT
Start: 2023-03-20 | End: 2023-03-21 | Stop reason: HOSPADM

## 2023-03-20 RX ORDER — MAGNESIUM HYDROXIDE/ALUMINUM HYDROXICE/SIMETHICONE 120; 1200; 1200 MG/30ML; MG/30ML; MG/30ML
30 SUSPENSION ORAL 3 TIMES DAILY
Status: DISCONTINUED | OUTPATIENT
Start: 2023-03-20 | End: 2023-03-21 | Stop reason: HOSPADM

## 2023-03-20 RX ADMIN — AMIODARONE HYDROCHLORIDE 200 MG: 200 TABLET ORAL at 10:07

## 2023-03-20 RX ADMIN — TAMSULOSIN HYDROCHLORIDE 0.4 MG: 0.4 CAPSULE ORAL at 16:38

## 2023-03-20 RX ADMIN — ATORVASTATIN CALCIUM 20 MG: 20 TABLET, FILM COATED ORAL at 23:00

## 2023-03-20 RX ADMIN — ALUMINA, MAGNESIA, AND SIMETHICONE ORAL SUSPENSION REGULAR STRENGTH 30 ML: 1200; 1200; 120 SUSPENSION ORAL at 10:10

## 2023-03-20 RX ADMIN — SACUBITRIL AND VALSARTAN 1 TABLET: 49; 51 TABLET, FILM COATED ORAL at 18:03

## 2023-03-20 RX ADMIN — FINASTERIDE 5 MG: 5 TABLET, FILM COATED ORAL at 10:07

## 2023-03-20 RX ADMIN — APIXABAN 5 MG: 5 TABLET, FILM COATED ORAL at 10:07

## 2023-03-20 RX ADMIN — ASPIRIN 81 MG: 81 TABLET, COATED ORAL at 10:07

## 2023-03-20 RX ADMIN — OXYCODONE HYDROCHLORIDE 5 MG: 5 TABLET ORAL at 16:39

## 2023-03-20 RX ADMIN — ALUMINA, MAGNESIA, AND SIMETHICONE ORAL SUSPENSION REGULAR STRENGTH 30 ML: 1200; 1200; 120 SUSPENSION ORAL at 23:00

## 2023-03-20 RX ADMIN — SACUBITRIL AND VALSARTAN 1 TABLET: 49; 51 TABLET, FILM COATED ORAL at 10:08

## 2023-03-20 RX ADMIN — IOHEXOL 100 ML: 350 INJECTION, SOLUTION INTRAVENOUS at 17:15

## 2023-03-20 RX ADMIN — PANTOPRAZOLE SODIUM 40 MG: 40 INJECTION, POWDER, FOR SOLUTION INTRAVENOUS at 10:07

## 2023-03-20 RX ADMIN — APIXABAN 5 MG: 5 TABLET, FILM COATED ORAL at 18:03

## 2023-03-20 RX ADMIN — DIGOXIN 125 MCG: 125 TABLET ORAL at 10:06

## 2023-03-20 RX ADMIN — ACETAMINOPHEN 650 MG: 325 TABLET ORAL at 22:59

## 2023-03-20 RX ADMIN — B-COMPLEX W/ C & FOLIC ACID TAB 1 TABLET: TAB at 10:06

## 2023-03-20 RX ADMIN — ALUMINA, MAGNESIA, AND SIMETHICONE ORAL SUSPENSION REGULAR STRENGTH 30 ML: 1200; 1200; 120 SUSPENSION ORAL at 16:38

## 2023-03-20 NOTE — PLAN OF CARE
Problem: PAIN - ADULT  Goal: Verbalizes/displays adequate comfort level or baseline comfort level  Description: Interventions:  - Encourage patient to monitor pain and request assistance  - Assess pain using appropriate pain scale  - Administer analgesics based on type and severity of pain and evaluate response  - Implement non-pharmacological measures as appropriate and evaluate response  - Consider cultural and social influences on pain and pain management  - Notify physician/advanced practitioner if interventions unsuccessful or patient reports new pain  Outcome: Progressing     Problem: INFECTION - ADULT  Goal: Absence or prevention of progression during hospitalization  Description: INTERVENTIONS:  - Assess and monitor for signs and symptoms of infection  - Monitor lab/diagnostic results  - Monitor all insertion sites, i e  indwelling lines, tubes, and drains  - Monitor endotracheal if appropriate and nasal secretions for changes in amount and color  - [facility] appropriate cooling/warming therapies per order  - Administer medications as ordered  - Instruct and encourage patient and family to use good hand hygiene technique  - Identify and instruct in appropriate isolation precautions for identified infection/condition  Outcome: Progressing  Goal: Absence of fever/infection during neutropenic period  Description: INTERVENTIONS:  - Monitor WBC    Outcome: Progressing     Problem: SAFETY ADULT  Goal: Patient will remain free of falls  Description: INTERVENTIONS:  - Educate patient/family on patient safety including physical limitations  - Instruct patient to call for assistance with activity   - Consult OT/PT to assist with strengthening/mobility   - Keep Call bell within reach  - Keep bed low and locked with side rails adjusted as appropriate  - Keep care items and personal belongings within reach  - Initiate and maintain comfort rounds  - Make Fall Risk Sign visible to staff  - Offer Toileting every    Hours, in advance of need  - Initiate/Maintain   alarm  - Obtain necessary fall risk management equipment:     - Apply yellow socks and bracelet for high fall risk patients  - Consider moving patient to room near nurses station  Outcome: Progressing  Goal: Maintain or return to baseline ADL function  Description: INTERVENTIONS:  -  Assess patient's ability to carry out ADLs; assess patient's baseline for ADL function and identify physical deficits which impact ability to perform ADLs (bathing, care of mouth/teeth, toileting, grooming, dressing, etc )  - Assess/evaluate cause of self-care deficits   - Assess range of motion  - Assess patient's mobility; develop plan if impaired  - Assess patient's need for assistive devices and provide as appropriate  - Encourage maximum independence but intervene and supervise when necessary  - Involve family in performance of ADLs  - Assess for home care needs following discharge   - Consider OT consult to assist with ADL evaluation and planning for discharge  - Provide patient education as appropriate  Outcome: Progressing  Goal: Maintains/Returns to pre admission functional level  Description: INTERVENTIONS:  - Perform BMAT or MOVE assessment daily    - Set and communicate daily mobility goal to care team and patient/family/caregiver  - Collaborate with rehabilitation services on mobility goals if consulted  - Perform Range of Motion      times a day  - Reposition patient every    hours    - Dangle patient    times a day  - Stand patient    times a day  - Ambulate patient    times a day  - Out of bed to chair    times a day   - Out of bed for meals  times a day  - Out of bed for toileting  - Record patient progress and toleration of activity level   Outcome: Progressing     Problem: DISCHARGE PLANNING  Goal: Discharge to home or other facility with appropriate resources  Description: INTERVENTIONS:  - Identify barriers to discharge w/patient and caregiver  - Arrange for needed discharge resources and transportation as appropriate  - Identify discharge learning needs (meds, wound care, etc )  - Arrange for interpretive services to assist at discharge as needed  - Refer to Case Management Department for coordinating discharge planning if the patient needs post-hospital services based on physician/advanced practitioner order or complex needs related to functional status, cognitive ability, or social support system  Outcome: Progressing     Problem: Knowledge Deficit  Goal: Patient/family/caregiver demonstrates understanding of disease process, treatment plan, medications, and discharge instructions  Description: Complete learning assessment and assess knowledge base    Interventions:  - Provide teaching at level of understanding  - Provide teaching via preferred learning methods  Outcome: Progressing

## 2023-03-20 NOTE — ASSESSMENT & PLAN NOTE
Wt Readings from Last 3 Encounters:   03/19/23 71 kg (156 lb 8 4 oz)   02/23/23 70 6 kg (155 lb 9 6 oz)   02/01/23 70 8 kg (156 lb)       Does not appear to be in exacerbation at this time  Continue Entresto  • Monitor I/O

## 2023-03-20 NOTE — PROGRESS NOTES
[address] [facility]  Progress Note - Renetta Cervantes 1940, 80 y o  male MRN: [id number]  Unit/Bed#: [facility] Encounter: [id number]  Primary Care Provider: OSKAR Dee   Date and time admitted to hospital: 3/19/2023 12:11 PM    * Upper abdominal pain  Assessment & Plan  · Patient complaining of pain in the transition of upper chest and lower abdomen  Continues to complain of fairly intense pain this afternoon as well  There is some tenderness to the palpation of epigastric area  · At this point in time I will shift investigation to upper abdominal pain causes, order lipase  · Order CT scan with contrast  · Continue Mylanta  · Monitor abdominal exam  · Patient requires continued stay for investigation of abdominal pain    Chest pain  Assessment & Plan  Lab Results   Component Value Date    HSTNI0 8 03/19/2023    HSTNI2 11 03/19/2023    HSTNID2 3 03/19/2023    HSTNI4 12 03/19/2023    HSTNID4 4 03/19/2023     Recent stress test/Echo: Nuclear stress test performed during prior admission (October 2022) showed no evidence of transient ischemic dilation    • CXR: No acute cardiopulmonary disease when compared to chest x-ray in February  • EKG: Sinus rhythm  • BOWEN Score: 5    • Troponins have been negative, cardiology has seen patient, no further work-up at this point cardiac wise  It is possible that the pain may be mostly related to upper GI causes  He continues to complain of pain  Abdominal pain work-up as mention      Gastroesophageal reflux disease  Assessment & Plan  · Protonix    Chronic diastolic congestive heart failure (HCC)  Assessment & Plan  Wt Readings from Last 3 Encounters:   03/19/23 71 kg (156 lb 8 4 oz)   02/23/23 70 6 kg (155 lb 9 6 oz)   02/01/23 70 8 kg (156 lb)       Does not appear to be in exacerbation at this time  Continue Entresto  • Monitor I/O    Mixed hyperlipidemia  Assessment & Plan  · Continue statin    Paroxysmal A-fib (HCC)  Assessment & Plan  · Paroxysmal A-fib on digoxin, amiodarone, and Eliquis  · Continue home meds        VTE Pharmacologic Prophylaxis:   Pharmacologic: Apixaban (Eliquis)  Mechanical VTE Prophylaxis in Place: Yes    Patient Centered Rounds: I have performed bedside rounds with nursing staff today  Discussions with Specialists or Other Care Team Provider: Discussed with care management team    Education and Discussions with Family / Patient: The patient did not ask me to talk to anyone    Time Spent for Care: 1 hour  More than 50% of total time spent on counseling and coordination of care as described above  Current Length of Stay: 0 day(s)    Current Patient Status: Observation   Certification Statement: The patient will continue to require additional inpatient hospital stay due to Need for abdominal pain work-up    Discharge Plan: Once stable    Code Status: Level 1 - Full Code      Subjective:     Patient evaluated this morning in the afternoon  Continues to complain of upper abdominal pain with tenderness to palpation  Also some nausea    Objective:     Vitals:   Temp (24hrs), Av 1 °F (36 7 °C), Min:97 6 °F (36 4 °C), Max:98 6 °F (37 °C)    Temp:  [97 6 °F (36 4 °C)-98 6 °F (37 °C)] 98 2 °F (36 8 °C)  HR:  [61-76] 61  Resp:  [15-21] 20  BP: ()/(53-84) 116/64  SpO2:  [98 %-100 %] 100 %  Body mass index is 20 1 kg/m²  Input and Output Summary (last 24 hours): Intake/Output Summary (Last 24 hours) at 3/20/2023 1608  Last data filed at 3/20/2023 1539  Gross per 24 hour   Intake 400 ml   Output 1300 ml   Net -900 ml       Physical Exam:     Physical Exam  Vitals and nursing note reviewed  Constitutional:       Appearance: Normal appearance  Comments: Male patient bed, awake   HENT:      Head: Normocephalic and atraumatic  Right Ear: External ear normal       Left Ear: External ear normal       Nose: Nose normal  No congestion or rhinorrhea        Mouth/Throat:      Mouth: Mucous membranes are moist       Pharynx: Oropharynx is clear  No oropharyngeal exudate or posterior oropharyngeal erythema  Eyes:      General: No scleral icterus  Right eye: No discharge  Left eye: No discharge  Pupils: Pupils are equal, round, and reactive to light  Neck:      Vascular: No carotid bruit  Cardiovascular:      Rate and Rhythm: Normal rate and regular rhythm  Pulses: Normal pulses  Heart sounds: No murmur heard  No friction rub  No gallop  Pulmonary:      Effort: Pulmonary effort is normal  No respiratory distress  Breath sounds: Normal breath sounds  No stridor  No wheezing, rhonchi or rales  Abdominal:      General: Abdomen is flat  Bowel sounds are normal  There is no distension  Palpations: Abdomen is soft  There is no mass  Tenderness: There is abdominal tenderness  There is no guarding or rebound  Hernia: No hernia is present  Comments: Tenderness to the palpation of epigastric area but no rebound or guarding   Musculoskeletal:         General: No swelling, tenderness, deformity or signs of injury  Normal range of motion  Cervical back: Normal range of motion  No rigidity  No muscular tenderness  Lymphadenopathy:      Cervical: No cervical adenopathy  Skin:     General: Skin is warm and dry  Capillary Refill: Capillary refill takes less than 2 seconds  Coloration: Skin is not jaundiced or pale  Findings: No bruising or erythema  Neurological:      General: No focal deficit present  Mental Status: He is alert and oriented to person, place, and time  Mental status is at baseline  Cranial Nerves: No cranial nerve deficit  Sensory: No sensory deficit  Motor: No weakness  Coordination: Coordination normal       Deep Tendon Reflexes: Reflexes normal    Psychiatric:         Mood and Affect: Mood normal          Behavior: Behavior normal          Thought Content:  Thought content normal          Judgment: Judgment normal            Additional Data:     Labs:    Results from last 7 days   Lab Units 03/20/23  0501   WBC Thousand/uL 3 72*   HEMOGLOBIN g/dL 10 8*   HEMATOCRIT % 33 9*   PLATELETS Thousands/uL 137*   NEUTROS PCT % 33*   LYMPHS PCT % 56*   MONOS PCT % 9   EOS PCT % 1     Results from last 7 days   Lab Units 03/20/23  0501   SODIUM mmol/L 135   POTASSIUM mmol/L 3 9   CHLORIDE mmol/L 106   CO2 mmol/L 25   BUN mg/dL 12   CREATININE mg/dL 0 94   ANION GAP mmol/L 4   CALCIUM mg/dL 8 7   ALBUMIN g/dL 3 4*   TOTAL BILIRUBIN mg/dL 1 01*   ALK PHOS U/L 35   ALT U/L 27   AST U/L 20   GLUCOSE RANDOM mg/dL 83         Results from last 7 days   Lab Units 03/20/23  1541 03/20/23  1106 03/20/23  0746 03/19/23  2119 03/19/23  1842   POC GLUCOSE mg/dl 107 107 82 112 90                   * I Have Reviewed All Lab Data Listed Above  * Additional Pertinent Lab Tests Reviewed:  [facility] [place] [facility] Reviewed    Recent Cultures (last 7 days):           Last 24 Hours Medication List:   Current Facility-Administered Medications   Medication Dose Route Frequency Provider Last Rate   • acetaminophen  650 mg Oral Q6H PRN Marci Ruiz MD     • aluminum-magnesium hydroxide-simethicone  30 mL Oral TID Marci Ruiz MD     • amiodarone  200 mg Oral Daily Jas Alejandro, DO     • apixaban  5 mg Oral BID Jas Alejandro, DO     • aspirin  81 mg Oral Daily [facility] Alejandro, DO     • atorvastatin  20 mg Oral HS Jas Alejandro, DO     • digoxin  125 mcg Oral Daily [facility] Alejandro, DO     • finasteride  5 mg Oral Daily [facility] Alejandro, DO     • multivitamin stress formula  1 tablet Oral Daily [facility] Alejandro, DO     • ondansetron  4 mg Intravenous Q4H PRN Jas Allen DO     • oxyCODONE  5 mg Oral Q6H PRN Marci Ruiz MD     • pantoprazole  40 mg Intravenous Q24H [address] [facility] Alejandro, DO     • sacubitril-valsartan  1 tablet Oral BID Jas Allen, DO     • tamsulosin  0 4 mg Oral Daily With [address], DO          Today, Patient Was Seen By: Ottis Bosworth, MD    ** Please Note: Dictation voice to text software may have been used in the creation of this document   **

## 2023-03-20 NOTE — ASSESSMENT & PLAN NOTE
Lab Results   Component Value Date    HSTNI0 8 03/19/2023    HSTNI2 11 03/19/2023    HSTNID2 3 03/19/2023    HSTNI4 12 03/19/2023    HSTNID4 4 03/19/2023     Recent stress test/Echo: Nuclear stress test performed during prior admission (October 2022) showed no evidence of transient ischemic dilation    • CXR: No acute cardiopulmonary disease when compared to chest x-ray in February  • EKG: Sinus rhythm  • BOWEN Score: 5     Plan:   • STAT EKG and troponin if chest pain, Telemetry for arrhythmias  • Nitroglycerin p r n , ASA  • Recommend outpatient Cardiology follow up

## 2023-03-20 NOTE — PLAN OF CARE
Problem: INFECTION - ADULT  Goal: Absence or prevention of progression during hospitalization  Description: INTERVENTIONS:  - Assess and monitor for signs and symptoms of infection  - Monitor lab/diagnostic results  - Monitor all insertion sites, i e  indwelling lines, tubes, and drains  - Monitor endotracheal if appropriate and nasal secretions for changes in amount and color  - Butler appropriate cooling/warming therapies per order  - Administer medications as ordered  - Instruct and encourage patient and family to use good hand hygiene technique  - Identify and instruct in appropriate isolation precautions for identified infection/condition  Outcome: Progressing

## 2023-03-20 NOTE — ASSESSMENT & PLAN NOTE
Wt Readings from Last 3 Encounters:   02/23/23 70 6 kg (155 lb 9 6 oz)   02/01/23 70 8 kg (156 lb)   11/30/22 70 8 kg (156 lb)       Does not appear to be in exacerbation at this time  Continue [facility],  • Monitor I/O

## 2023-03-20 NOTE — CONSULTS
Consultation - Cardiology   Alex Cervantes 80 y o  male MRN: [id number]  Unit/Bed#: -01 Encounter: [id number]  03/20/23  3:05 PM    Assessment/ Plan:  1  Chest pain, troponins negative x3  Pain is more in the epigastric area  Pain appears to be noncardiac  Stress test done October 2022, EF 44%, LV perfusion defect noted medium in size with moderate reduction in uptake present in the inferior location that is fixed, partial improvement with prone imaging  Findings consistent with old MI/tissue attenuation  Echocardiogram done at the same time EF 60%, mild concentric hypertrophy, grade 1 diastolic dysfunction, biatrial dilation, moderate AI, mild MR, mild TR, mildly dilated aortic root  No further cardiac testing planned at this time  Continue amiodarone, Eliquis, aspirin, Lipitor, digoxin, Entresto  2   Chronic diastolic heart failure appears to be euvolemic at this time  Continue on medications  Daily weights  Salt restriction  3   Paroxysmal atrial fibrillation, continue with amiodarone, Eliquis, digoxin  Monitor telemetry  4   Hypertension, actually on the lower side, 96/60     5  Hyperlipidemia on Lipitor  6   History of cardiomyopathy with AICD implantation  Most recent echo showed EF recovery at 60%  7   Anemia, H&H today 10 8/32 9, hemoglobin from yesterday was normal at 12 0/38  2  Continue to follow  Management per Slim  History of Present Illness   Physician Requesting Consult: Tasha Kim,*    Reason for Consult / Principal Problem: cp, trop -    HPI: Delfino Marie is a 80y o  year old male who presents with chest pain for the last couple of days  Patient noted to have chest pain associate with shortness of breath, sweating, nausea  Chest x-ray in the ED showed minimal changes from prior x-ray and was read as no acute pulmonary disease  Troponins were negative    Patient states the chest pain was described more along the lines of epigastric and abdominal pain than chest pain  Pain appears to be more epigastric and is tender to temptation  Patient does have history of CAD with MI in 2013/ history of PCI, history of ICD, atrial fibrillation on Eliquis  Patient was recently seen by EP to discuss pocket revision as he was complaining of pain around the site  PMH: CAD, PCI, ICD, Afib, hypertension, hyperlipidemia, CHF    Consults    EKG: Sinus rhythm with marked sinus arrhythmia, low voltage QRS, left anterior fascicular block, nonspecific T wave abnormalities      Review of Systems   Constitutional: Positive for diaphoresis  Respiratory: Positive for shortness of breath  Cardiovascular: Positive for chest pain  Gastrointestinal: Positive for abdominal pain and nausea  Neurological: Negative  Hematological: Negative  Psychiatric/Behavioral: Negative  All other systems reviewed and are negative  Historical Information   Past Medical History:   Diagnosis Date   • CHF (congestive heart failure) ([facility])    • Hyperlipidemia    • Hypertension    • MI (myocardial infarction) ([facility] [address] )    • Presence of cardiac defibrillator    • Prostate disorder      Past Surgical History:   Procedure Laterality Date   • CORONARY ANGIOPLASTY WITH STENT PLACEMENT       Social History     Substance and Sexual Activity   Alcohol Use Not Currently     Social History     Substance and Sexual Activity   Drug Use Never     Social History     Tobacco Use   Smoking Status Never   Smokeless Tobacco Never       Family History: History reviewed  No pertinent family history      Meds/Allergies   all current active meds have been reviewed and current meds:   Current Facility-Administered Medications   Medication Dose Route Frequency   • aluminum-magnesium hydroxide-simethicone (MYLANTA) oral suspension 30 mL  30 mL Oral TID   • amiodarone tablet 200 mg  200 mg Oral Daily   • apixaban (ELIQUIS) tablet 5 mg  5 mg Oral BID   • aspirin (ECOTRIN LOW STRENGTH) EC tablet 81 mg 81 mg Oral Daily   • atorvastatin (LIPITOR) tablet 20 mg  20 mg Oral HS   • digoxin (LANOXIN) tablet 125 mcg  125 mcg Oral Daily   • finasteride (PROSCAR) tablet 5 mg  5 mg Oral Daily   • multivitamin stress formula tablet 1 tablet  1 tablet Oral Daily   • ondansetron (ZOFRAN) injection 4 mg  4 mg Intravenous Q4H PRN   • pantoprazole (PROTONIX) injection 40 mg  40 mg Intravenous Q24H NISHA   • sacubitril-valsartan (ENTRESTO) 49-51 MG per tablet 1 tablet  1 tablet Oral BID   • tamsulosin (FLOMAX) capsule 0 4 mg  0 4 mg Oral Daily With Dinner     No Known Allergies    Objective   Vitals: Blood pressure 96/60, pulse 61, temperature 98 1 °F (36 7 °C), resp  rate 18, height 6' 2" (1 88 m), weight 71 kg (156 lb 8 4 oz), SpO2 100 %  , Body mass index is 20 1 kg/m² ,   Orthostatic Blood Pressures    Flowsheet Row Most Recent Value   Blood Pressure 96/60 filed at 03/20/2023 1156   Patient Position - Orthostatic VS Lying filed at 03/20/2023 4858          Systolic (68AUZ), WCC:275 , Min:96 , MJI:515     Diastolic (07SAE), JYB:47, Min:53, Max:84        Intake/Output Summary (Last 24 hours) at 3/20/2023 1505  Last data filed at 3/20/2023 0201  Gross per 24 hour   Intake 400 ml   Output 700 ml   Net -300 ml       Invasive Devices     Peripheral Intravenous Line  Duration           Peripheral IV 03/19/23 Dorsal (posterior); Left Forearm <1 day    Peripheral IV 03/19/23 Dorsal (posterior); Right Forearm <1 day                    Physical Exam:  GEN: Alert and oriented x 3, in no acute distress  Well appearing and well nourished  HEENT: Sclera anicteric, conjunctivae pink, mucous membranes moist  Oropharynx clear  NECK: Supple, no carotid bruits, no significant JVD  Trachea midline, no thyromegaly  HEART: Regular rhythm, normal S1 and S2, no murmurs, clicks, gallops or rubs  PMI nondisplaced, no thrills  LUNGS: Clear to auscultation bilaterally; no wheezes, rales, or rhonchi   No increased work of breathing or signs of respiratory distress  ABDOMEN: Soft, nontender, nondistended, normoactive bowel sounds  EXTREMITIES: Skin warm and well perfused, no clubbing, cyanosis, or edema  NEURO: No focal findings  Normal speech  Mood and affect normal    SKIN: Normal without suspicious lesions on exposed skin        Lab Results:     Troponins:   Results from last 7 days   Lab Units 03/19/23  1644 03/19/23  1426 03/19/23  1227   HS TNI 0HR ng/L  --   --  8   HS TNI 2HR ng/L  --  11  --    HS TNI 4HR ng/L 12  --   --    HSTNI D4 ng/L 4  --   --        CBC with diff:   Results from last 7 days   Lab Units 03/20/23  0501 03/19/23  1227   WBC Thousand/uL 3 72* 3 96*   HEMOGLOBIN g/dL 10 8* 12 0   HEMATOCRIT % 33 9* 38 2   MCV fL 87 89   PLATELETS Thousands/uL 137* 132*   MCH pg 27 8 28 0   MCHC g/dL 31 9 31 4   RDW % 15 3* 15 7*   MPV fL 11 2 10 9   NRBC AUTO /100 WBCs 0 0         CMP:   Results from last 7 days   Lab Units 03/20/23  0501 03/19/23  1227   POTASSIUM mmol/L 3 9 4 2   CHLORIDE mmol/L 106 107   CO2 mmol/L 25 24   BUN mg/dL 12 11   CREATININE mg/dL 0 94 1 04   CALCIUM mg/dL 8 7 8 6   AST U/L 20 24   ALT U/L 27 32   ALK PHOS U/L 35 37   EGFR ml/min/1 73sq m 75 66

## 2023-03-20 NOTE — ASSESSMENT & PLAN NOTE
Lab Results   Component Value Date    HSTNI0 8 03/19/2023    HSTNI2 11 03/19/2023    HSTNID2 3 03/19/2023    HSTNI4 12 03/19/2023    HSTNID4 4 03/19/2023     Recent stress test/Echo: Nuclear stress test performed during prior admission (October 2022) showed no evidence of transient ischemic dilation    • CXR: No acute cardiopulmonary disease when compared to chest x-ray in February  • EKG: Sinus rhythm  • BOWEN Score: 5    • Troponins have been negative, cardiology has seen patient, no further work-up at this point cardiac wise  It is possible that the pain may be mostly related to upper GI causes  He continues to complain of pain  Abdominal pain work-up as mention

## 2023-03-20 NOTE — QUICK NOTE
Noted during shift when patient lays on his right or left side he has increased Multifocal PVCs with bigemy  When patient turns  onto  back or abdomen which he sleeps at home noted on telemetry minimal to no ectopy   Pt denies chest pain or SOB , however states " when he was home  he has noticed lately feeling different like dizzy after getting up from side lying position in bed and has to sit on edge of bed for a minute and dizziness goes away , pt states defib pacemaker inserted in 2013 site Left CW  Slim aware continue to monitor

## 2023-03-20 NOTE — ASSESSMENT & PLAN NOTE
· Patient complaining of pain in the transition of upper chest and lower abdomen  Continues to complain of fairly intense pain this afternoon as well  There is some tenderness to the palpation of epigastric area  · At this point in time I will shift investigation to upper abdominal pain causes, order lipase    · Order CT scan with contrast  · Continue Mylanta  · Monitor abdominal exam  · Patient requires continued stay for investigation of abdominal pain

## 2023-03-20 NOTE — H&P
[address] [place] 1940, 80 y o  male MRN: [id number]  Unit/Bed#: MS [facility] Encounter: [id number]  Primary Care Provider: OSKAR Huerta   Date and time admitted to hospital: 3/19/2023 12:11 PM    * Chest pain  Assessment & Plan  Lab Results   Component Value Date    HSTNI0 8 03/19/2023    HSTNI2 11 03/19/2023    HSTNID2 3 03/19/2023    HSTNI4 12 03/19/2023    HSTNID4 4 03/19/2023     Recent stress test/Echo: Nuclear stress test performed during prior admission (October 2022) showed no evidence of transient ischemic dilation    • CXR: No acute cardiopulmonary disease when compared to chest x-ray in February  • EKG: Sinus rhythm  • BOWEN Score: 5     Plan:   • STAT EKG and troponin if chest pain, Telemetry for arrhythmias  • Nitroglycerin p r n , ASA  • Recommend outpatient Cardiology follow up    Chronic diastolic congestive heart failure (HCC)  Assessment & Plan  Wt Readings from Last 3 Encounters:   02/23/23 70 6 kg (155 lb 9 6 oz)   02/01/23 70 8 kg (156 lb)   11/30/22 70 8 kg (156 lb)       Does not appear to be in exacerbation at this time  Continue Josh Leyva,  • Monitor I/O    Gastroesophageal reflux disease  Assessment & Plan  Protonix    Mixed hyperlipidemia  Assessment & Plan  Continue statin    Paroxysmal A-fib (HCC)  Assessment & Plan  Paroxysmal A-fib on digoxin, amiodarone, and Eliquis    Pulse: 70    Continue home meds      VTE Pharmacologic Prophylaxis: VTE Score: 4 Moderate Risk (Score 3-4) - Pharmacological DVT Prophylaxis Ordered: apixaban (Eliquis)  Code Status: Level 1 - Full Code   Discussion with Patient/Family: Patient and daughter at bedside    Anticipated Length of Stay: Patient will be admitted on an observation basis with an anticipated length of stay of less than 2 midnights secondary to Plan noted above      Total Time for Visit, including Counseling / Coordination of Care: 65 minutes Greater than 50% of this total time spent on direct patient counseling and coordination of care  Chief Complaint:   Chief Complaint   Patient presents with   • Chest Pain     Chest pain that travels to the left back with SOB that started last night  Ems states O2 was in the low 80's on arrival  Given 1 nitro and 325 of ASA  History of Present Illness:  Justyn Harrington is a 80 y o  male who presents with Persistent chest pain for the past 3 days     PMHx CAD s/p MI 2013 s/p PCI, ICD PPM in place, Afib on Eliquis  Previously was admitted in October 2022 for pain around defibrillator during which time Cardiology conducted nuclear stress test that was negative  Had also been evaluated by EP outpatient and was offered pocket revision for chest pain vs complete explantation without reimplantation      During this admission, patient noted to have CP associated with SOB, diaphoresis and nausea, prompting visit to the ED  CXR in ED showed minimal changes from prior CXR in 2/0 which was read as no acute cardiopulm disease  Troponin was negative x 2, and overall labwork was negative     On my evaluation    Patient's symptoms have significantly improved but chest pain was described more along the lines of epigastric and abdominal pain  Given patient's significant cardiac history, admitted for overnight observation for ACS rule out    Review of Systems:  A 10-point review of systems was obtained  Pertinent positives and negatives are outlined in the HPI above  Remainder of review of systems are otherwise negative       Past Medical and Surgical History:   Past Medical History:   Diagnosis Date   • CHF (congestive heart failure) ([facility] [address] )    • Hyperlipidemia    • Hypertension    • MI (myocardial infarction) ([facility] [address] )    • Presence of cardiac defibrillator    • Prostate disorder        Past Surgical History:   Procedure Laterality Date   • CORONARY ANGIOPLASTY WITH STENT PLACEMENT         Meds/Allergies:  Prior to Admission medications    Medication Sig Start Date End Date Taking? Authorizing Provider   amiodarone 200 mg tablet TAKE 1 TABLET BY MOUTH EVERY DAY 10/11/22  Yes Kasia Sotelo MD   apixaban (ELIQUIS) 5 mg Take 1 tablet (5 mg total) by mouth 2 (two) times a day 11/30/22  Yes Kasia Sotelo MD   aspirin (ECOTRIN LOW STRENGTH) 81 mg EC tablet Take 81 mg by mouth daily   Yes Historical Provider, MD   atorvastatin (LIPITOR) 20 mg tablet Take 1 tablet (20 mg total) by mouth daily  Patient taking differently: Take 20 mg by mouth daily at bedtime 3/29/22  Yes OSKAR Rahman   digoxin (LANOXIN) 0 125 mg tablet Take 1 tablet (125 mcg total) by mouth daily 7/27/22  Yes Garrett Parsons MD   Farxiga 10 MG tablet TAKE 1 TABLET BY MOUTH EVERY DAY  Patient taking differently: Take 10 mg by mouth every other day 12/27/22  Yes OSKAR Rahman   finasteride (PROSCAR) 5 mg tablet Take 1 tablet (5 mg total) by mouth daily  Patient taking differently: Take 5 mg by mouth every other day 5/9/22  Yes Freda Arguello PA-C   Multiple Vitamin (Daily-Misael Multivitamin) TABS TAKE 1 TABLET BY MOUTH EVERY DAY 12/20/22  Yes OSKAR Rahman   sacubitril-valsartan (Entresto) 49-51 MG TABS Take 1 tablet by mouth 2 (two) times a day 11/30/22  Yes Kasia Sotelo MD   tamsulosin [facility]) 0 4 mg Take 1 capsule (0 4 mg total) by mouth daily with dinner 5/9/22  Yes Freda Arguello PA-C   hydrocortisone 2 5 % cream APPLY TO AFFECTED AREA TWICE A DAY  Patient not taking: Reported on 11/30/2022 10/11/22   OSKAR Rahman     I have reviewed home medications with patient family member  Allergies: No Known Allergies    Social History:  Marital Status:     Patient Pre-hospital Living Situation: Home  Patient Pre-hospital Level of Mobility: unable to be assessed at time of evaluation  Patient Pre-hospital Diet Restrictions: None  Substance Use History:   Social History     Substance and Sexual Activity   Alcohol Use Not Currently     Social History     Tobacco Use Smoking Status Never   Smokeless Tobacco Never     Social History     Substance and Sexual Activity   Drug Use Never       Family History:  History reviewed  No pertinent family history  Physical Exam:     Vitals:   Blood Pressure: 125/82 (03/19/23 1902)  Pulse: 70 (03/19/23 1902)  Temperature: 97 6 °F (36 4 °C) (03/19/23 1902)  Temp Source: Oral (03/19/23 1215)  Respirations: 16 (03/19/23 1902)  SpO2: 100 % (03/19/23 1902)    Physical Exam  Vitals and nursing note reviewed  Constitutional:       General: He is not in acute distress  Appearance: He is well-developed  He is not diaphoretic  HENT:      Head: Normocephalic and atraumatic  Nose: Nose normal    Eyes:      General: No scleral icterus  Conjunctiva/sclera: Conjunctivae normal       Pupils: Pupils are equal, round, and reactive to light  Neck:      Thyroid: No thyromegaly  Cardiovascular:      Rate and Rhythm: Normal rate and regular rhythm  Pulses:           Radial pulses are 2+ on the right side and 2+ on the left side  Heart sounds: Normal heart sounds  No murmur heard  No friction rub  No gallop  Pulmonary:      Effort: Pulmonary effort is normal  No respiratory distress  Breath sounds: Normal breath sounds  No stridor  No wheezing or rales  Chest:      Chest wall: No deformity (Pacemaker) or tenderness  Abdominal:      General: Bowel sounds are normal  There is no distension  Palpations: Abdomen is soft  There is no mass  Tenderness: There is no abdominal tenderness  Musculoskeletal:         General: No deformity  Normal range of motion  Cervical back: Normal range of motion and neck supple  Right lower leg: No edema  Left lower leg: No edema  Skin:     General: Skin is warm and dry  Neurological:      Mental Status: He is alert and oriented to person, place, and time  Psychiatric:         Behavior: Behavior normal          Thought Content:  Thought content normal  Judgment: Judgment normal                 Additional Data:     Lab Results:  Results from last 7 days   Lab Units 03/19/23  1227   WBC Thousand/uL 3 96*   HEMOGLOBIN g/dL 12 0   HEMATOCRIT % 38 2   PLATELETS Thousands/uL 132*   NEUTROS PCT % 49   LYMPHS PCT % 41   MONOS PCT % 8   EOS PCT % 1     Results from last 7 days   Lab Units 03/19/23  1227   SODIUM mmol/L 137   POTASSIUM mmol/L 4 2   CHLORIDE mmol/L 107   CO2 mmol/L 24   BUN mg/dL 11   CREATININE mg/dL 1 04   ANION GAP mmol/L 6   CALCIUM mg/dL 8 6   ALBUMIN g/dL 3 6   TOTAL BILIRUBIN mg/dL 1 10*   ALK PHOS U/L 37   ALT U/L 32   AST U/L 24   GLUCOSE RANDOM mg/dL 111         Results from last 7 days   Lab Units 03/19/23  2119 03/19/23  1842   POC GLUCOSE mg/dl 112 90               Imaging Results Reviewed as noted below  X-ray chest 1 view portable   ED Interpretation by Gill Reed DO (03/19 1320)   No acute cardiopulmonary abnormalities          No results found  No Chest XR results available for this patient  EKG and Other Studies Reviewed on Admission:   · EKG: Normal sinus    Recent Labs     03/19/23  1637   VENTRATE 60         ** Please Note: This note has been constructed using a voice recognition system   **

## 2023-03-20 NOTE — UTILIZATION REVIEW
Initial Clinical Review    Admission: Date/Time/Statement:   Admission Orders (From admission, onward)     Ordered        03/19/23 1548  Place in Observation  Once                      03/20/23 1615  Inpatient Admission  Once        Transfer Service: Hospitalist       Question Answer Comment   Level of Care Med Surg    Estimated length of stay More than 2 Midnights    Certification I certify that inpatient services are medically necessary for this patient for a duration of greater than two midnights  See H&P and MD Progress Notes for additional information about the patient's course of treatment  03/20/23 1614   OBSERVATION    3/19  @  1548 CHANGED TO IP ADMISSION  3/20 @   1615  The patient will continue to require additional inpatient hospital stay due to Need for abdominal pain work-up       ED Arrival Information     Expected   -    Arrival   3/19/2023 12:11    Acuity   Emergent            Means of arrival   Ambulance    Escorted by   Jeannie Eaton   Hospitalist    Admission type   Emergency            Arrival complaint   Chest Pain            Chief Complaint   Patient presents with   • Chest Pain     Chest pain that travels to the left back with SOB that started last night  Ems states O2 was in the low 80's on arrival  Given 1 nitro and 325 of ASA  Initial Presentation: 80 y o  male presents to ED via  EMS  With chest pain for past 3 days  PMH  Is   CAD, S/P  MI in 2013, S/P PCI,  ICD, PPM,  GERD, CHF,  Afib  On  Eliquis  Previously was admitted in October 2022 for pain around defibrillator during which time Cardiology conducted nuclear stress test that was negative  Had also been evaluated by EP outpatient and was offered pocket revision for chest pain vs complete explantation without reimplantation  Has shortness of  Breath, diaphoresis, nausea  Troponin  Negative  X2  BOWEN score   5     Admit  Observation with  Chest pain and plan is  Tele, monitor labs,   Trend troponin, and continue home meds  3/20    Cardiology consult  Pain appears epigastric,  Non cardiac    No additional cardiac testing       3/21     D/C  h ome        ED Triage Vitals   Temperature Pulse Respirations Blood Pressure SpO2   03/19/23 1215 03/19/23 1214 03/19/23 1214 03/19/23 1216 03/19/23 1214   97 6 °F (36 4 °C) 69 20 125/67 95 %      Temp Source Heart Rate Source Patient Position - Orthostatic VS BP Location FiO2 (%)   03/19/23 1215 03/19/23 1902 03/19/23 1214 03/19/23 1214 --   Oral Monitor Sitting Right arm       Pain Score       03/19/23 1846       8          Wt Readings from Last 1 Encounters:   03/19/23 71 kg (156 lb 8 4 oz)     Additional Vital Signs:   98 3 °F (36 8 °C) 66 18 98/63 75 99 % -- --    03/20/23 0400 -- 62 16 110/68 67 99 % None (Room air) Lying   03/20/23 02:16:10 -- 64 16 101/53 69 100 % None (Room air) Lying   03/19/23 2243 97 8 °F (36 6 °C) 76 16 122/84 99 98 % None (Room air) Lying   03/19/23 2001 98 6 °F (37 °C) 65 16 120/74 71 -- -- Lying   03/19/23 19:02:47 97 6 °F (36 4 °C) 70 16 125/82 96 100 % None (Room air) Lying   03/19/23 1630 -- 66 21 124/60 86 100 % -- --   03/19/23 1615 -- 61 15 116/60 79 100 % -- --   03/19/23 1600 -- 67 20 111/60 80 100 % -- --   03/19/23 1545 -- 63 15 102/62 77 100 % -- --   03/19/23 1530 -- 63 13 98/60 73 100 % -- --   03/19/23 1515 -- 63 12 99/59 74 100 % -- --   03/19/23 1500 -- 62 16 97/58 73 100 % -- --   03/19/23 1445 -- 63 13 102/58 75 100 % -- --   03/19/23 1430 -- 65 18 97/53 69 100 % -- --   03/19/23 1406 -- 59 17 116/62 82 100 % -- --   03/19/23 1400 -- 64 18 114/63 82 100 % -- --   03/19/23 1330 -- 58 14 101/56 74 99 % -- --   03/19/23 1300 -- 63 14 93/55 68 98 % -- --   03/19/23 1216 -- -- -- 125/67 -- -- -- --   03/19/23 1215 97 6 °F (36 4 °C) -- -- -- -- -- -- --   03/19/23 1214 -- 69 20 -- -- 95 % None (Room air) Sitting       Pertinent Labs/Diagnostic Test Results:   EKG  SR    X-ray chest 1 view portable   ED Interpretation by Maria L Remy [facility] (03/19 1320)   No acute cardiopulmonary abnormalities            Results from last 7 days   Lab Units 03/20/23  0501 03/19/23  1227   WBC Thousand/uL 3 72* 3 96*   HEMOGLOBIN g/dL 10 8* 12 0   HEMATOCRIT % 33 9* 38 2   PLATELETS Thousands/uL 137* 132*   NEUTROS ABS Thousands/µL 1 23* 1 99         Results from last 7 days   Lab Units 03/20/23  0501 03/19/23  1227   SODIUM mmol/L 135 137   POTASSIUM mmol/L 3 9 4 2   CHLORIDE mmol/L 106 107   CO2 mmol/L 25 24   ANION GAP mmol/L 4 6   BUN mg/dL 12 11   CREATININE mg/dL 0 94 1 04   EGFR ml/min/1 73sq m 75 66   CALCIUM mg/dL 8 7 8 6     Results from last 7 days   Lab Units 03/20/23  0501 03/19/23  1227   AST U/L 20 24   ALT U/L 27 32   ALK PHOS U/L 35 37   TOTAL PROTEIN g/dL 6 3* 6 8   ALBUMIN g/dL 3 4* 3 6   TOTAL BILIRUBIN mg/dL 1 01* 1 10*     Results from last 7 days   Lab Units 03/20/23  0746 03/19/23  2119 03/19/23  1842   POC GLUCOSE mg/dl 82 112 90     Results from last 7 days   Lab Units 03/20/23  0501 03/19/23  1227   GLUCOSE RANDOM mg/dL 83 111               Results from last 7 days   Lab Units 03/19/23  1644 03/19/23  1426 03/19/23  1227   HS TNI 0HR ng/L  --   --  8   HS TNI 2HR ng/L  --  11  --    HSTNI D2 ng/L  --  3  --    HS TNI 4HR ng/L 12  --   --    HSTNI D4 ng/L 4  --   --                Results from last 7 days   Lab Units 03/19/23  1227   BNP pg/mL 38             ED Treatment:   Medication Administration from 03/19/2023 1211 to 03/19/2023 1756       Date/Time Order Dose Route Action Comments     03/19/2023 1421 EDT nitroglycerin (NITROSTAT) SL tablet 0 4 mg 0 4 mg Sublingual Given --        Present on Admission:  • Chest pain  • Chronic diastolic congestive heart failure (HCC)  • Mixed hyperlipidemia  • Gastroesophageal reflux disease  • Paroxysmal A-fib (HCC)      Admitting Diagnosis: Chest pain [R07 9]  Age/Sex: 80 y o  male  Admission Orders:  Scheduled Medications:  amiodarone, 200 mg, Oral, Daily  apixaban, 5 mg, Oral, BID  aspirin, 81 mg, Oral, Daily  atorvastatin, 20 mg, Oral, HS  digoxin, 125 mcg, Oral, Daily  Empagliflozin, 10 mg, Oral, Daily  finasteride, 5 mg, Oral, Daily  insulin lispro, 1-5 Units, Subcutaneous, TID AC  insulin lispro, 1-5 Units, Subcutaneous, HS  multivitamin stress formula, 1 tablet, Oral, Daily  pantoprazole, 40 mg, Intravenous, Q24H NISHA  sacubitril-valsartan, 1 tablet, Oral, BID  tamsulosin, 0 4 mg, Oral, Daily With Dinner      Continuous IV Infusions:     PRN Meds:  ondansetron, 4 mg, Intravenous, Q4H PRN        IP CONSULT TO CARDIOLOGY    Network Utilization Review Department  ATTENTION: Please call with any questions or concerns to [phone] and carefully listen to the prompts so that you are directed to the right person  All voicemails are confidential   Hillary Ronquillo all requests for admission clinical reviews, approved or denied determinations and any other requests to dedicated fax number below belonging to the campus where the patient is receiving treatment   List of dedicated fax numbers for the Facilities:  [address] DENIALS (Administrative/Medical Necessity) [phone]   [address] [place] [address] (Maternity/NICU/Pediatrics) [phone]   [address] Amy Spencer [phone]   [facility] Jarek  [phone]   [address] [place] [address] [facility] [phone]   [address] Olav [facility] 134 815 [facility] [phone]

## 2023-03-21 VITALS
DIASTOLIC BLOOD PRESSURE: 66 MMHG | OXYGEN SATURATION: 99 % | HEART RATE: 62 BPM | SYSTOLIC BLOOD PRESSURE: 110 MMHG | WEIGHT: 156.53 LBS | BODY MASS INDEX: 20.09 KG/M2 | RESPIRATION RATE: 18 BRPM | HEIGHT: 74 IN | TEMPERATURE: 97.8 F

## 2023-03-21 LAB
ALBUMIN SERPL BCP-MCNC: 3.3 G/DL (ref 3.5–5)
ALP SERPL-CCNC: 36 U/L (ref 34–104)
ALT SERPL W P-5'-P-CCNC: 26 U/L (ref 7–52)
ANION GAP SERPL CALCULATED.3IONS-SCNC: 3 MMOL/L (ref 4–13)
AST SERPL W P-5'-P-CCNC: 19 U/L (ref 13–39)
ATRIAL RATE: 60 BPM
BASOPHILS # BLD AUTO: 0.02 THOUSANDS/ÂΜL (ref 0–0.1)
BASOPHILS NFR BLD AUTO: 1 % (ref 0–1)
BILIRUB SERPL-MCNC: 1.01 MG/DL (ref 0.2–1)
BUN SERPL-MCNC: 10 MG/DL (ref 5–25)
CALCIUM ALBUM COR SERPL-MCNC: 9.6 MG/DL (ref 8.3–10.1)
CALCIUM SERPL-MCNC: 9 MG/DL (ref 8.4–10.2)
CHLORIDE SERPL-SCNC: 103 MMOL/L (ref 96–108)
CO2 SERPL-SCNC: 28 MMOL/L (ref 21–32)
CREAT SERPL-MCNC: 1.04 MG/DL (ref 0.6–1.3)
EOSINOPHIL # BLD AUTO: 0.06 THOUSAND/ÂΜL (ref 0–0.61)
EOSINOPHIL NFR BLD AUTO: 2 % (ref 0–6)
ERYTHROCYTE [DISTWIDTH] IN BLOOD BY AUTOMATED COUNT: 15.2 % (ref 11.6–15.1)
GFR SERPL CREATININE-BSD FRML MDRD: 66 ML/MIN/1.73SQ M
GLUCOSE SERPL-MCNC: 89 MG/DL (ref 65–140)
HCT VFR BLD AUTO: 33.8 % (ref 36.5–49.3)
HGB BLD-MCNC: 11.1 G/DL (ref 12–17)
IMM GRANULOCYTES # BLD AUTO: 0.01 THOUSAND/UL (ref 0–0.2)
IMM GRANULOCYTES NFR BLD AUTO: 0 % (ref 0–2)
INR PPP: 1.34 (ref 0.84–1.19)
LYMPHOCYTES # BLD AUTO: 2.41 THOUSANDS/ÂΜL (ref 0.6–4.47)
LYMPHOCYTES NFR BLD AUTO: 59 % (ref 14–44)
MCH RBC QN AUTO: 28.7 PG (ref 26.8–34.3)
MCHC RBC AUTO-ENTMCNC: 32.8 G/DL (ref 31.4–37.4)
MCV RBC AUTO: 87 FL (ref 82–98)
MONOCYTES # BLD AUTO: 0.33 THOUSAND/ÂΜL (ref 0.17–1.22)
MONOCYTES NFR BLD AUTO: 8 % (ref 4–12)
NEUTROPHILS # BLD AUTO: 1.23 THOUSANDS/ÂΜL (ref 1.85–7.62)
NEUTS SEG NFR BLD AUTO: 30 % (ref 43–75)
NRBC BLD AUTO-RTO: 0 /100 WBCS
P AXIS: 11 DEGREES
PLATELET # BLD AUTO: 134 THOUSANDS/UL (ref 149–390)
PMV BLD AUTO: 10.6 FL (ref 8.9–12.7)
POTASSIUM SERPL-SCNC: 4 MMOL/L (ref 3.5–5.3)
PR INTERVAL: 168 MS
PROT SERPL-MCNC: 6.4 G/DL (ref 6.4–8.4)
PROTHROMBIN TIME: 16.3 SECONDS (ref 11.6–14.5)
QRS AXIS: -57 DEGREES
QRSD INTERVAL: 104 MS
QT INTERVAL: 430 MS
QTC INTERVAL: 430 MS
RBC # BLD AUTO: 3.87 MILLION/UL (ref 3.88–5.62)
SODIUM SERPL-SCNC: 134 MMOL/L (ref 135–147)
T WAVE AXIS: 92 DEGREES
VENTRICULAR RATE: 60 BPM
WBC # BLD AUTO: 4.06 THOUSAND/UL (ref 4.31–10.16)

## 2023-03-21 RX ORDER — KETOROLAC TROMETHAMINE 30 MG/ML
15 INJECTION, SOLUTION INTRAMUSCULAR; INTRAVENOUS ONCE
Status: COMPLETED | OUTPATIENT
Start: 2023-03-21 | End: 2023-03-21

## 2023-03-21 RX ORDER — MAGNESIUM HYDROXIDE/ALUMINUM HYDROXICE/SIMETHICONE 120; 1200; 1200 MG/30ML; MG/30ML; MG/30ML
30 SUSPENSION ORAL 3 TIMES DAILY
Qty: 355 ML | Refills: 0 | Status: SHIPPED | OUTPATIENT
Start: 2023-03-21 | End: 2023-03-31

## 2023-03-21 RX ORDER — ATORVASTATIN CALCIUM 20 MG/1
20 TABLET, FILM COATED ORAL
Start: 2023-03-21 | End: 2023-03-29

## 2023-03-21 RX ORDER — PANTOPRAZOLE SODIUM 40 MG/1
40 TABLET, DELAYED RELEASE ORAL 2 TIMES DAILY
Qty: 60 TABLET | Refills: 0 | Status: SHIPPED | OUTPATIENT
Start: 2023-03-21 | End: 2023-04-20

## 2023-03-21 RX ADMIN — PANTOPRAZOLE SODIUM 40 MG: 40 INJECTION, POWDER, FOR SOLUTION INTRAVENOUS at 08:45

## 2023-03-21 RX ADMIN — AMIODARONE HYDROCHLORIDE 200 MG: 200 TABLET ORAL at 08:45

## 2023-03-21 RX ADMIN — DIGOXIN 125 MCG: 125 TABLET ORAL at 08:44

## 2023-03-21 RX ADMIN — ASPIRIN 81 MG: 81 TABLET, COATED ORAL at 08:45

## 2023-03-21 RX ADMIN — B-COMPLEX W/ C & FOLIC ACID TAB 1 TABLET: TAB at 08:45

## 2023-03-21 RX ADMIN — ALUMINA, MAGNESIA, AND SIMETHICONE ORAL SUSPENSION REGULAR STRENGTH 30 ML: 1200; 1200; 120 SUSPENSION ORAL at 08:44

## 2023-03-21 RX ADMIN — APIXABAN 5 MG: 5 TABLET, FILM COATED ORAL at 08:45

## 2023-03-21 RX ADMIN — FINASTERIDE 5 MG: 5 TABLET, FILM COATED ORAL at 08:45

## 2023-03-21 RX ADMIN — SACUBITRIL AND VALSARTAN 1 TABLET: 49; 51 TABLET, FILM COATED ORAL at 08:45

## 2023-03-21 RX ADMIN — KETOROLAC TROMETHAMINE 15 MG: 30 INJECTION, SOLUTION INTRAMUSCULAR at 01:59

## 2023-03-21 NOTE — ASSESSMENT & PLAN NOTE
Lab Results   Component Value Date    HSTNI0 8 03/19/2023    HSTNI2 11 03/19/2023    HSTNID2 3 03/19/2023    HSTNI4 12 03/19/2023    HSTNID4 4 03/19/2023     resolved    Recent stress test/Echo: Nuclear stress test performed during prior admission (October 2022) showed no evidence of transient ischemic dilation    • CXR: No acute cardiopulmonary disease when compared to chest x-ray in February  • EKG: Sinus rhythm  • BOWEN Score: 5    • Troponins have been negative, cardiology has seen patient, no further work-up at this point cardiac wise  It is possible that the pain may be mostly related to upper GI causes  He continues to complain of pain  Abdominal pain work-up as mention

## 2023-03-21 NOTE — INCIDENTAL FINDINGS
The following findings require follow up:  Radiographic finding   Findin 5cm ascending aortic dilation   Follow up required: yes   Follow up should be done within 6 month(s)    Please notify the following clinician to assist with the follow up:   Dr Naga Henriquez

## 2023-03-21 NOTE — PLAN OF CARE
Problem: PAIN - ADULT  Goal: Verbalizes/displays adequate comfort level or baseline comfort level  Description: Interventions:  - Encourage patient to monitor pain and request assistance  - Assess pain using appropriate pain scale  - Administer analgesics based on type and severity of pain and evaluate response  - Implement non-pharmacological measures as appropriate and evaluate response  - Consider cultural and social influences on pain and pain management  - Notify physician/advanced practitioner if interventions unsuccessful or patient reports new pain  Outcome: Progressing     Problem: INFECTION - ADULT  Goal: Absence or prevention of progression during hospitalization  Description: INTERVENTIONS:  - Assess and monitor for signs and symptoms of infection  - Monitor lab/diagnostic results  - Monitor all insertion sites, i e  indwelling lines, tubes, and drains  - Monitor endotracheal if appropriate and nasal secretions for changes in amount and color  - [place] appropriate cooling/warming therapies per order  - Administer medications as ordered  - Instruct and encourage patient and family to use good hand hygiene technique  - Identify and instruct in appropriate isolation precautions for identified infection/condition  Outcome: Progressing  Goal: Absence of fever/infection during neutropenic period  Description: INTERVENTIONS:  - Monitor WBC    Outcome: Progressing     Problem: SAFETY ADULT  Goal: Patient will remain free of falls  Description: INTERVENTIONS:  - Educate patient/family on patient safety including physical limitations  - Instruct patient to call for assistance with activity   - Consult OT/PT to assist with strengthening/mobility   - Keep Call bell within reach  - Keep bed low and locked with side rails adjusted as appropriate  - Keep care items and personal belongings within reach  - Initiate and maintain comfort rounds  - Make Fall Risk Sign visible to staff  - Offer Toileting every  Hours, in advance of need  - Initiate/Maintain alarm  - Obtain necessary fall risk management equipment:   - Apply yellow socks and bracelet for high fall risk patients  - Consider moving patient to room near nurses station  Outcome: Progressing  Goal: Maintain or return to baseline ADL function  Description: INTERVENTIONS:  -  Assess patient's ability to carry out ADLs; assess patient's baseline for ADL function and identify physical deficits which impact ability to perform ADLs (bathing, care of mouth/teeth, toileting, grooming, dressing, etc )  - Assess/evaluate cause of self-care deficits   - Assess range of motion  - Assess patient's mobility; develop plan if impaired  - Assess patient's need for assistive devices and provide as appropriate  - Encourage maximum independence but intervene and supervise when necessary  - Involve family in performance of ADLs  - Assess for home care needs following discharge   - Consider OT consult to assist with ADL evaluation and planning for discharge  - Provide patient education as appropriate  Outcome: Progressing  Goal: Maintains/Returns to pre admission functional level  Description: INTERVENTIONS:  - Perform BMAT or MOVE assessment daily    - Set and communicate daily mobility goal to care team and patient/family/caregiver  - Collaborate with rehabilitation services on mobility goals if consulted  - Perform Range of Motion  times a day  - Reposition patient every  hours    - Dangle patient  times a day  - Stand patient  times a day  - Ambulate patient  times a day  - Out of bed to chair  times a day   - Out of bed for meals times a day  - Out of bed for toileting  - Record patient progress and toleration of activity level   Outcome: Progressing     Problem: DISCHARGE PLANNING  Goal: Discharge to home or other facility with appropriate resources  Description: INTERVENTIONS:  - Identify barriers to discharge w/patient and caregiver  - Arrange for needed discharge resources and transportation as appropriate  - Identify discharge learning needs (meds, wound care, etc )  - Arrange for interpretive services to assist at discharge as needed  - Refer to Case Management Department for coordinating discharge planning if the patient needs post-hospital services based on physician/advanced practitioner order or complex needs related to functional status, cognitive ability, or social support system  Outcome: Progressing     Problem: Knowledge Deficit  Goal: Patient/family/caregiver demonstrates understanding of disease process, treatment plan, medications, and discharge instructions  Description: Complete learning assessment and assess knowledge base    Interventions:  - Provide teaching at level of understanding  - Provide teaching via preferred learning methods  Outcome: Progressing

## 2023-03-21 NOTE — CASE MANAGEMENT
Case Management Assessment & Discharge Planning Note    Patient name Jutsyn Harrington  Location /-01 MRN [id number]  : 1940 Date 3/21/2023       Current Admission Date: 3/19/2023  Current Admission Diagnosis:Upper abdominal pain   Patient Active Problem List    Diagnosis Date Noted   • Upper abdominal pain 2023   • 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[address] ) 2022   • Anemia in other chronic diseases classified elsewhere 10/28/2022   • Chest pain 10/27/2022   • Gastroesophageal reflux disease 2022   • Coronary artery disease involving native heart without angina pectoris 2021   • Paroxysmal A-fib ([address] ) 2021   • Mixed hyperlipidemia 2021   • AICD (automatic cardioverter/defibrillator) present 2021   • Chronic diastolic congestive heart failure ([address] ) 2021   • Protein-calorie malnutrition, unspecified severity ([address] ) 2021      LOS (days): 1  Geometric Mean LOS (GMLOS) (days):   Days to GMLOS:     OBJECTIVE:    Risk of Unplanned Readmission Score: 15 94         Current admission status: Inpatient       Preferred Pharmacy:   [facility]/pharmacy [address] [place] [address]  Phone: [phone] Fax: [phone]    Primary Care Provider: OSKAR Amaro    Primary Insurance: [facility]  Secondary Insurance:     ASSESSMENT:  Efrain Representative - Daughter   Primary Phone: [phone] (Mobile)               Advance Directives  Does patient have a [address]?: No  Was patient offered paperwork?: Yes (Patient declined paperwork at this time )  Does patient currently have a Health Care decision maker?: Yes, please see Health Care Proxy section (Patient identified his daughter as his health care representative )  Does patient have Advance Directives?: No  Was patient offered paperwork?: Yes (Patient declined paperwork at this time )  Primary Contact: Patient's Daughter    Readmission Root Cause  30 Day Readmission: No    Patient Information  Admitted from[de-identified] Home  Mental Status: Alert  During Assessment patient was accompanied by: Not accompanied during assessment  Assessment information provided by[de-identified] Patient  Primary Caregiver: Self  Support Systems: Self, Daughter  [place] of Residence: [place] [address] do you live in?: [address] entry access options   Select all that apply : Stairs  Do the steps have railings?: Yes  Type of Current Residence: Bi-level  Upon entering residence, is there a bedroom on the main floor (no further steps)?: Yes  Upon entering residence, is there a bathroom on the main floor (no further steps)?: Yes  In the last 12 months, was there a time when you were not able to pay the mortgage or rent on time?: No  In the last 12 months, how many places have you lived?: 1  In the last 12 months, was there a time when you did not have a steady place to sleep or slept in a shelter (including now)?: No  Homeless/housing insecurity resource given?: N/A  Living Arrangements: Lives w/ Daughter  Is patient a ?: No    Activities of Daily Living Prior to Admission  Functional Status: Independent  Completes ADLs independently?: No  Level of ADL dependence: Assistance  Ambulates independently?: Yes  Does patient use assisted devices?: No  Does patient currently own DME?: No  Does patient have a history of Outpatient Therapy (PT/OT)?: No  Does the patient have a history of Short-Term Rehab?: No  Does patient have a history of HHC?: Yes  Does patient currently have [place] [address]?: No    Patient Information Continued  Income Source: Pension/[employer]  Does patient have prescription coverage?: Yes (Patient confirmed that he uses [facility] in [facility], and he denied any barriers to obtaining or affording prescriptions )  Within the past 12 months, you worried that your food would run out before you got the money to buy more : Never true  Within the past 12 months, the food you bought just didn't last and you didn't have money to get more : Never true  Food insecurity resource given?: N/A  Does patient receive dialysis treatments?: No  Does patient have a history of substance abuse?: No  Does patient have a history of Mental Health Diagnosis?: No    Means of Transportation  Means of Transport to Appts[de-identified] Drives Self  In the past 12 months, has lack of transportation kept you from medical appointments or from getting medications?: No  In the past 12 months, has lack of transportation kept you from meetings, work, or from getting things needed for daily living?: No  Was application for public transport provided?: N/A    DISCHARGE DETAILS:    Discharge planning discussed with[de-identified] Patient  Freedom of Choice: Yes  Comments - Freedom of Choice: CM discussed freedom of choice as it pertains to discharge planning  Patient denied any needs at this time and is not interested in [facility]    CM contacted family/caregiver?: No- see comments (Patient declined phone call at this time )  Were Treatment Team discharge recommendations reviewed with patient/caregiver?: Yes  Did patient/caregiver verbalize understanding of patient care needs?: Yes  Were patient/caregiver advised of the risks associated with not following Treatment Team discharge recommendations?: Yes    [address]         Is the patient interested in [place] [address] at discharge?: No    DME Referral Provided  Referral made for DME?: No    Other Referral/Resources/Interventions Provided:  Interventions: None Indicated    Would you like to participate in our [address] service program?  : No - Declined    Treatment Team Recommendation: Home  Discharge Destination Plan[de-identified] Home  Transport at Discharge : Family

## 2023-03-21 NOTE — PROGRESS NOTES
Patient c/o 10/10  Headache with lightheadedness  On-call SLIM notified  1x dose toradol 15mg ordered by SLIM  Administered to patient  At present patient liu García  03/21/23  3:17 AM

## 2023-03-21 NOTE — PROGRESS NOTES
Cardiology Progress Note - Alex Cervantes 80 y o  male MRN: [id number]    Unit/Bed#: -01 Encounter: [id number]      Assessment/Plan:  1  Chest pain, noncardiac  • Pain appears more epigastric in nature  • High-sensitivity troponins negative x3  • Pharmacologic MPI done in October 2022 which is consistent with old MI/tissue attenuation  • TTE this admission showed EF 60%, moderate AI, mild MR, mild TR, mild dilated aortic root, biatrial dilation, G1 DD  • Continue amiodarone, Eliquis, aspirin, Lipitor, digoxin, and Entresto    2  Chronic HFpEF  • Patient appears euvolemic  • Maintain strict I/O's, daily weights, and low sodium diet  • Continue Entresto    3  PAF  • Continue amiodarone and digoxin  • Continue Eliquis    4  Hypertension  • BP mildly soft at this time    5  History of cardiomyopathy with recovered EF, s/p AICD  · Continue Entresto and digoxin    6  Anemia  · Hemoglobin 11 1, no evidence of overt bleed  Will see as needed  Please reach out with any questions or concerns  Subjective:   Patient seen and examined  No significant events overnight  Patient denies chest pain or shortness of breath  Objective:     Vitals: Blood pressure 110/66, pulse 62, temperature 97 8 °F (36 6 °C), resp  rate 18, height 6' 2" (1 88 m), weight 71 kg (156 lb 8 4 oz), SpO2 99 %  , Body mass index is 20 1 kg/m² ,   Orthostatic Blood Pressures    Flowsheet Row Most Recent Value   Blood Pressure 110/66 filed at 03/21/2023 0719   Patient Position - Orthostatic VS Lying filed at 03/20/2023 0400            Intake/Output Summary (Last 24 hours) at 3/21/2023 1617  Last data filed at 3/21/2023 0900  Gross per 24 hour   Intake 920 ml   Output 1625 ml   Net -705 ml         Physical Exam:  Physical Exam  Vitals and nursing note reviewed  Constitutional:       General: He is not in acute distress  Appearance: He is well-developed  HENT:      Head: Normocephalic and atraumatic     Eyes: Conjunctiva/sclera: Conjunctivae normal    Neck:      Vascular: No carotid bruit  Cardiovascular:      Rate and Rhythm: Normal rate and regular rhythm  Heart sounds: Normal heart sounds  No murmur heard  Pulmonary:      Effort: Pulmonary effort is normal  No respiratory distress  Breath sounds: Normal breath sounds  Abdominal:      Palpations: Abdomen is soft  Tenderness: There is no abdominal tenderness  Musculoskeletal:         General: No swelling  Cervical back: Neck supple  Right lower leg: No edema  Left lower leg: No edema  Skin:     General: Skin is warm and dry  Capillary Refill: Capillary refill takes less than 2 seconds  Neurological:      Mental Status: He is alert and oriented to person, place, and time     Psychiatric:         Mood and Affect: Mood normal               Medications:      Current Facility-Administered Medications:   •  acetaminophen (TYLENOL) tablet 650 mg, 650 mg, Oral, Q6H PRN, Srinivas Fan MD, 650 mg at 03/20/23 2259  •  aluminum-magnesium hydroxide-simethicone (MYLANTA) oral suspension 30 mL, 30 mL, Oral, TID, Srinivas Fan MD, 30 mL at 03/21/23 5559  •  amiodarone tablet 200 mg, 200 mg, Oral, Daily, Jas Megaeswaran, DO, 200 mg at 03/21/23 0845  •  apixaban (ELIQUIS) tablet 5 mg, 5 mg, Oral, BID, Jas Megaesisabelan, DO, 5 mg at 03/21/23 0845  •  aspirin (ECOTRIN LOW STRENGTH) EC tablet 81 mg, 81 mg, Oral, Daily, Jas Megaeswaran, DO, 81 mg at 03/21/23 0845  •  atorvastatin (LIPITOR) tablet 20 mg, 20 mg, Oral, HS, Jas Megaeswaran, DO, 20 mg at 03/20/23 2300  •  digoxin (LANOXIN) tablet 125 mcg, 125 mcg, Oral, Daily, Jas Megaeswaran, DO, 125 mcg at 03/21/23 0844  •  finasteride (PROSCAR) tablet 5 mg, 5 mg, Oral, Daily, Jas Parameswaran, DO, 5 mg at 03/21/23 0845  •  multivitamin stress formula tablet 1 tablet, 1 tablet, Oral, Daily, Jas Alejandro, DO, 1 tablet at 03/21/23 0845  •  ondansetron (Rosendo Arroyo) injection 4 mg, 4 mg, Intravenous, Q4H PRN, Jas Allen DO  •  oxyCODONE (ROXICODONE) IR tablet 5 mg, 5 mg, Oral, Q6H PRN, Deborah Coates MD, 5 mg at 03/20/23 1639  •  pantoprazole (PROTONIX) injection 40 mg, 40 mg, Intravenous, Q24H [address], Jas Allen DO, 40 mg at 03/21/23 0845  •  sacubitril-valsartan (ENTRESTO) 49-51 MG per tablet 1 tablet, 1 tablet, Oral, BID, Jas Allen DO, 1 tablet at 03/21/23 0845  •  tamsulosin (FLOMAX) capsule 0 4 mg, 0 4 mg, Oral, Daily With Dinner, Jas Allen DO, 0 4 mg at 03/20/23 1638     Labs & Results:     Results from last 7 days   Lab Units 03/19/23  1644 03/19/23  1426 03/19/23  1227   HS TNI 0HR ng/L  --   --  8   HS TNI 2HR ng/L  --  11  --    HSTNI D2 ng/L  --  3  --    HS TNI 4HR ng/L 12  --   --    HSTNI D4 ng/L 4  --   --      Results from last 7 days   Lab Units 03/21/23  0549 03/20/23  0501 03/19/23  1227   WBC Thousand/uL 4 06* 3 72* 3 96*   HEMOGLOBIN g/dL 11 1* 10 8* 12 0   HEMATOCRIT % 33 8* 33 9* 38 2   PLATELETS Thousands/uL 134* 137* 132*         Results from last 7 days   Lab Units 03/21/23  0549 03/20/23  0501 03/19/23  1227   POTASSIUM mmol/L 4 0 3 9 4 2   CHLORIDE mmol/L 103 106 107   CO2 mmol/L 28 25 24   BUN mg/dL 10 12 11   CREATININE mg/dL 1 04 0 94 1 04   CALCIUM mg/dL 9 0 8 7 8 6   ALK PHOS U/L 36 35 37   ALT U/L 26 27 32   AST U/L 19 20 24     Results from last 7 days   Lab Units 03/21/23  0549   INR  1 34*           Vitals: Blood pressure 110/66, pulse 62, temperature 97 8 °F (36 6 °C), resp  rate 18, height 6' 2" (1 88 m), weight 71 kg (156 lb 8 4 oz), SpO2 99 %  , Body mass index is 20 1 kg/m² ,   Orthostatic Blood Pressures    Flowsheet Row Most Recent Value   Blood Pressure 110/66 filed at 03/21/2023 0719   Patient Position - Orthostatic VS Lying filed at 03/20/2023 0605          Systolic (61UYO), BBS:312 , Min:110 , VYJ:475     Diastolic (81SWS), GDA:92, Min:64, Max:67        Intake/Output Summary (Last 24 hours) at 3/21/2023 1617  Last data filed at 3/21/2023 0900  Gross per 24 hour   Intake 920 ml   Output 1625 ml   Net -705 ml       Invasive Devices     Peripheral Intravenous Line  Duration           Peripheral IV 03/19/23 Dorsal (posterior); Left Forearm 1 day    Peripheral IV 03/19/23 Dorsal (posterior); Right Forearm 1 day                    BP Readings from Last 3 Encounters:   03/21/23 110/66   02/23/23 118/78   02/01/23 102/68      Wt Readings from Last 3 Encounters:   03/19/23 71 kg (156 lb 8 4 oz)   02/23/23 70 6 kg (155 lb 9 6 oz)   02/01/23 70 8 kg (156 lb)

## 2023-03-21 NOTE — DISCHARGE SUMMARY
[facility]  Discharge- [facility] 1940, 80 y o  male MRN: [id number]  Unit/Bed#: -[place] Encounter: [id number]  Primary Care Provider: OSKAR Diehl   Date and time admitted to hospital: 3/19/2023 12:11 PM    * Upper abdominal pain  Assessment & Plan  · CT shows gastritis  · Pain well controlled  · Cont protonix at DC, 40mg PO BID    Chest pain  Assessment & Plan  Lab Results   Component Value Date    HSTNI0 8 03/19/2023    HSTNI2 11 03/19/2023    HSTNID2 3 03/19/2023    HSTNI4 12 03/19/2023    HSTNID4 4 03/19/2023     resolved    Recent stress test/Echo: Nuclear stress test performed during prior admission (October 2022) showed no evidence of transient ischemic dilation    • CXR: No acute cardiopulmonary disease when compared to chest x-ray in February  • EKG: Sinus rhythm  • BOWEN Score: 5    • Troponins have been negative, cardiology has seen patient, no further work-up at this point cardiac wise  It is possible that the pain may be mostly related to upper GI causes  He continues to complain of pain  Abdominal pain work-up as mention      Chronic diastolic congestive heart failure (HCC)  Assessment & Plan  Wt Readings from Last 3 Encounters:   03/19/23 71 kg (156 lb 8 4 oz)   02/23/23 70 6 kg (155 lb 9 6 oz)   02/01/23 70 8 kg (156 lb)       · Does not appear to be in exacerbation at this time  · Continue Entresto  • Monitor I/O    Mixed hyperlipidemia  Assessment & Plan  · Continue statin    Paroxysmal A-fib (HCC)  Assessment & Plan  · Stable  · Paroxysmal A-fib on digoxin, amiodarone, and Eliquis  · Continue home meds      Medical Problems     Resolved Problems  Date Reviewed: 3/21/2023   None       Discharging Physician / Practitioner: Chioma Motley MD  PCP: Jesse Diehl  Admission Date:   Admission Orders (From admission, onward)     Ordered        03/20/23 1614  Inpatient Admission  Once            03/19/23 1548  Place in Observation  Once Discharge Date: 03/21/23    Consultations During Hospital Stay:  · IP CONSULT TO CARDIOLOGY]      Procedures Performed:   · NONE    Significant Findings / Test Results:   · Gastritis on CT abd    Incidental Findings:   · Incidentally noted is a 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   · I reviewed the above mentioned incidental findings with the patient and/or family and they expressed understanding  Test Results Pending at Discharge (will require follow up):   · none     Outpatient Tests Requested:  · none    Complications:  none    Reason for Admission: CP and abd pain    Hospital Course:   Ja Cannon is a 80 y o  male patient who originally presented to the hospital on 3/19/2023 due to chest pain  Cardiology was consulted  ACS was ruled out  Troponins negative  Concerned that the pain was abdominal in nature  CT abdomen done which shows mild gastritis  Pain has significantly improved with proton pump inhibitors  Patient is cleared for discharge with p o  Protonix 40 mg twice daily and should follow-up outpatient with his gastroenterologist in [place]  Please see above list of diagnoses and related plan for additional information       Condition at Discharge: stable    Discharge Day Visit / Exam:   Subjective: Abdominal pain is much better, no nausea no vomiting, tolerating diet  Vitals: Blood Pressure: 110/66 (03/21/23 0719)  Pulse: 62 (03/21/23 0719)  Temperature: 97 8 °F (36 6 °C) (03/21/23 0719)  Temp Source: Oral (03/19/23 2243)  Respirations: 18 (03/21/23 0719)  Height: 6' 2" (188 cm) (03/19/23 2243)  Weight - Scale: 71 kg (156 lb 8 4 oz) (03/19/23 2243)  SpO2: 99 % (03/21/23 0719)  Exam:   Physical Exam General- Awake, alert and oriented x 3, looks comfortable  HEENT- Normocephalic, atraumatic, oral mucosa- moist  Neck- Supple, No carotid bruit, no JVD  CVS- Normal S1/ S2, Regular rate and rhythm, No murmur, No edema  Respiratory system- B/L clear breath sounds, no wheezing  Abdomen- Soft, Non distended, no tenderness, Bowel sound- present 4 quads  Genitourinary- No suprapubic tenderness, No CVA tenderness  Skin- No new bruise or rash  Musculoskeletal- No gross deformity  Psych- No acute psychosis  CNS- CN II- XII grossly intact, No acute focal neurologic deficit noted      Discussion with Family: Updated  (daughter) via phone  I have informed patient's daughter Michaela Moore over the phone on [phone] at around 1:40 PM on March 21, 2023 and the patient himself about the gastritis and the incidental finding of 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 which will need further surveillance  Patient and his daughter verbalized understanding and will follow-up with vascular surgery as outpatient    Discharge instructions/Information to patient and family:   See after visit summary for information provided to patient and family  Provisions for Follow-Up Care:  See after visit summary for information related to follow-up care and any pertinent home health orders  Disposition:   Home    Planned Readmission: no     Discharge Statement:  I spent 65 minutes discharging the patient  This time was spent on the day of discharge  I had direct contact with the patient on the day of discharge  Greater than 50% of the total time was spent examining patient, answering all patient questions, arranging and discussing plan of care with patient as well as directly providing post-discharge instructions  Additional time then spent on discharge activities  Discharge Medications:  See after visit summary for reconciled discharge medications provided to patient and/or family        **Please Note: This note may have been constructed using a voice recognition system**

## 2023-03-21 NOTE — UTILIZATION REVIEW
NOTIFICATION OF INPATIENT ADMISSION   AUTHORIZATION REQUEST   SERVICING FACILITY:   [address] [place] [address]  Tax ID: [id number]  NPI: [id number] ATTENDING PROVIDER:  Attending Name and NPI#: Sandor Wolfe Md [[id number]]  Address: [address] [place] [address]  Phone: [phone]     ADMISSION INFORMATION:  Place of Service: [place] [address]  Place of Service Code: 21  Inpatient Admission Date/Time: 3/20/23  4:14 PM  Discharge Date/Time: No discharge date for patient encounter  Admitting Diagnosis Code/Description:  Chest pain [R07 9]     UTILIZATION REVIEW CONTACT:  Artem Ramirez Utilization   Network Utilization Review Department  Phone: [phone]  Fax [phone]  Email: [email or web address]  Contact for approvals/pending authorizations, clinical reviews, and discharge  PHYSICIAN ADVISORY SERVICES:  Medical Necessity Denial & Fuvz-rn-Rrrs Review  Phone: [phone]  Fax: [phone]  Email: [email or web address]

## 2023-03-22 NOTE — UTILIZATION REVIEW
NOTIFICATION OF ADMISSION DISCHARGE   This is a Notification of Discharge from [facility]  Please be advised that this patient has been discharge from our facility  Below you will find the admission and discharge date and time including the patient’s disposition  UTILIZATION REVIEW CONTACT:  Myriam Kaye  Utilization   Network Utilization Review Department  Phone: [phone] x carefully listen to the prompts  All voicemails are confidential   Email: [email or web address]     ADMISSION INFORMATION  PRESENTATION DATE: 3/19/2023 12:11 PM  OBERVATION ADMISSION DATE: 3/19/23  INPATIENT ADMISSION DATE: 3/20/23  4:14 PM   DISCHARGE DATE: 3/21/2023  5:22 PM   DISPOSITION:Home/Self Care    IMPORTANT INFORMATION:  Send all requests for admission clinical reviews, approved or denied determinations and any other requests to dedicated fax number below belonging to the campus where the patient is receiving treatment   List of dedicated fax numbers:  [address] DENIALS (Administrative/Medical Necessity) [phone]   [address] (Maternity/NICU/Pediatrics) [phone]   [facility] [phone]   [place] [address] [phone]   [facility] [place]  [phone]   [address] [facility] [phone]   [address] [place] [address] [phone]   [address] [place] [address] [phone]   [facility]  [phone]   [facility] [phone]   [facility] [address] [phone]

## 2023-03-24 ENCOUNTER — TRANSITIONAL CARE MANAGEMENT (OUTPATIENT)
Dept: FAMILY MEDICINE CLINIC | Facility: CLINIC | Age: 83
End: 2023-03-24

## 2023-03-27 ENCOUNTER — RA CDI HCC (OUTPATIENT)
Dept: OTHER | Facility: HOSPITAL | Age: 83
End: 2023-03-27

## 2023-03-27 NOTE — PROGRESS NOTES
[facility]  coding opportunities          Chart Reviewed number of suggestions sent to Provider: 2  I11 0  I42 9     Patients Insurance     Medicare Insurance: [facility] Ad[employer]age

## 2023-03-28 ENCOUNTER — OFFICE VISIT (OUTPATIENT)
Dept: FAMILY MEDICINE CLINIC | Facility: CLINIC | Age: 83
End: 2023-03-28

## 2023-03-28 VITALS
BODY MASS INDEX: 20.12 KG/M2 | HEIGHT: 74 IN | WEIGHT: 156.8 LBS | SYSTOLIC BLOOD PRESSURE: 110 MMHG | DIASTOLIC BLOOD PRESSURE: 72 MMHG | TEMPERATURE: 97.5 F

## 2023-03-28 DIAGNOSIS — I50.32 CHRONIC DIASTOLIC CONGESTIVE HEART FAILURE (HCC): ICD-10-CM

## 2023-03-28 DIAGNOSIS — K21.9 GASTROESOPHAGEAL REFLUX DISEASE, UNSPECIFIED WHETHER ESOPHAGITIS PRESENT: ICD-10-CM

## 2023-03-28 DIAGNOSIS — I20.2 REFRACTORY ANGINA PECTORIS (HCC): ICD-10-CM

## 2023-03-28 DIAGNOSIS — I77.810 ASCENDING AORTA DILATION (HCC): Primary | ICD-10-CM

## 2023-03-28 NOTE — PATIENT INSTRUCTIONS
GERD (Gastroesophageal Reflux Disease)   AMBULATORY CARE:   Gastroesophageal reflux disease (GERD)  is reflux that happens more than 2 times a week for a few weeks  Reflux means acid and food in your stomach back up into your esophagus  GERD can cause other health problems over time if it is not treated  Common causes of GERD:  GERD often happens because the lower muscle (sphincter) of the esophagus does not close properly  The sphincter normally opens to let food into the stomach  It then closes to keep food and stomach acid in the stomach  If the sphincter does not close properly, stomach acid and food back up (reflux) into the esophagus  The following may increase your risk for GERD:  Certain foods such as spicy foods, chocolate, foods that contain caffeine, peppermint, and fried foods    Hiatal hernia    Certain medicines such as calcium channel blockers (used to treat high blood pressure), allergy medicines, sedatives, or antidepressants    Pregnancy, obesity, or scleroderma    Lying down after a meal    Drinking alcohol or smoking cigarettes    Signs and symptoms:   Heartburn (burning pain in your chest)    Pain after meals that spreads to your neck, jaw, or shoulder    Pain that gets better when you change positions    Bitter or acid taste in your mouth    A dry cough    Trouble swallowing or pain with swallowing    Hoarseness or a sore throat    Burping or hiccups    Feeling full soon after you start eating    Call your local emergency number (911 in the [facility]) if:   You have severe chest pain and sudden trouble breathing  Seek care immediately if:   You have trouble breathing after you vomit  You have trouble swallowing, or pain with swallowing  Your bowel movements are black, bloody, or tarry-looking  Your vomit looks like coffee grounds or has blood in it  Call your doctor or gastroenterologist if:   You feel full and cannot burp or vomit      You vomit large amounts, or you vomit often     You are losing weight without trying  Your symptoms get worse or do not improve with treatment  You have questions or concerns about your condition or care  Treatment for GERD:   Medicines  are used to decrease stomach acid  Medicine may also be used to help your lower esophageal sphincter and stomach contract (tighten) more  Surgery  is done to wrap the upper part of the stomach around the esophageal sphincter  This will strengthen the sphincter and prevent reflux  Manage GERD:       Do not have foods or drinks that may increase heartburn  These include chocolate, peppermint, fried or fatty foods, drinks that contain caffeine, or carbonated drinks (soda)  Other foods include spicy foods, onions, tomatoes, and tomato-based foods  Do not have foods or drinks that can irritate your esophagus, such as citrus fruits, juices, and alcohol  Do not eat large meals  When you eat a lot of food at one time, your stomach needs more acid to digest it  Eat 6 small meals each day instead of 3 large meals, and eat slowly  Do not eat meals 2 to 3 hours before bedtime  Elevate the head of your bed  Place 6-inch blocks under the head of your bed frame  You may also use more than one pillow under your head and shoulders while you sleep  Maintain a healthy weight  If you are overweight, weight loss may help relieve symptoms of GERD  Do not smoke  Smoking weakens the lower esophageal sphincter and increases the risk of GERD  Ask your healthcare provider for information if you currently smoke and need help to quit  E-cigarettes or smokeless tobacco still contain nicotine  Talk to your healthcare provider before you use these products  Do not put pressure on your abdomen  Pressure pushes acid up into your esophagus  Do not wear clothing that is tight around your waist  Do not bend over  Bend at the knees if you need to pick something up    Follow up with your doctor or gastroenterologist as directed:  Write down your questions so you remember to ask them during your visits  © Copyright [facility] 2022 Information is for End User's use only and may not be sold, redistributed or otherwise used for commercial purposes  The above information is an  only  It is not intended as medical advice for individual conditions or treatments  Talk to your doctor, nurse or pharmacist before following any medical regimen to see if it is safe and effective for you

## 2023-03-28 NOTE — PROGRESS NOTES
FAMILY MEDICINE TRANSITION OF CARE OFFICE VISIT  [facility]    NAME: Alex Cervantes  AGE: 80 y o  SEX: male  : 1940   MRN: [id number]    DATE: 3/28/2023  TIME: 12:12 PM    Assessment and Plan     Problem List Items Addressed This Visit        Digestive    Gastroesophageal reflux disease     Patient notes improvement with initiation of Mylanta and Protonix  Educated patient on avoiding spicy/acidic foods, eating smaller, more frequent meals, avoiding caffeine avoiding eating heavy meals then going to lay down  Cardiovascular and Mediastinum    Chronic diastolic congestive heart failure (HCC)     Wt Readings from Last 3 Encounters:   23 71 1 kg (156 lb 12 8 oz)   23 71 kg (156 lb 8 4 oz)   23 70 6 kg (155 lb 9 6 oz)     Stable  Denies recent exacerbations  Advised patient to question cardiology if he still needs to be on [place]  As per patient, was on medication by previous provider in [place]  To continue on Entresto  Continue f/u with cardiology  Ascending aorta dilation (HCC) - Primary     Incidental finding during recent hospitalization of 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  Will refer to vascular  Relevant Orders    Ambulatory Referral to Vascular Surgery       Other    Chest pain     Patient denies chest pain or epigastric pain  To continue follow-up with cardiology as scheduled  Return to office in: 2 months    Transitional Care Management Review   Khushbu Stubbs is a 80 y o  male here for TCM follow-up    During the TCM phone call patient stated:    TCM Call     Date and time call was made  3/23/2023 10:12 AM    Patient was hospitialized at  [address] [place] [address] [address]    Date of Admission  23    Date of discharge  23    Diagnosis  chest pain    Disposition  Home      TCM Call     Scheduled for follow up?   Yes    I have advised the patient to call PCP with any new or worsening symptoms BEBO Baez          History of Present Illness     Patient presents to the hospital for transition of care visit  Patient hospitalized 3/19 - 3/21 for chest pain and SOB  Hospital records reviewed  Cardiac work-up was negative in hospital   Was observed to have gastritis/GERD  Patient was initiated on Protonix and Mylanta  Patient notes improvement of epigastric symptoms being started on medication  Patient did have incidental finding of 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  Patient was referred to vascular center in [place], but will place referral to a closer facility  Has f/u appointments with device interrogation, urology, and cardiology  Patient currently denies chest pain, palpitations, shortness of breath, weakness, dizziness, syncope, back pain  Patient has been active and eating well since coming home from hospital   Patient currently denies any complaints at this time  The following portions of the patient's history were reviewed and updated as appropriate: allergies, current medications, past family history, past medical history, past social history, past surgical history and problem list     Review of Systems   Review of Systems   Constitutional: Negative for activity change, appetite change, diaphoresis, fatigue and unexpected weight change  HENT: Negative for congestion, ear pain, sore throat and trouble swallowing  Eyes: Negative for photophobia and visual disturbance  Respiratory: Negative for cough, chest tightness and shortness of breath  Cardiovascular: Negative for chest pain and palpitations  Gastrointestinal: Negative for abdominal pain, blood in stool, nausea and vomiting  Genitourinary: Negative for decreased urine volume  Musculoskeletal: Negative for arthralgias, back pain and myalgias  Skin: Negative for color change and rash  Neurological: Negative for dizziness, syncope, weakness, light-headedness and headaches  "  Psychiatric/Behavioral: Negative for confusion  Active Problem List     Patient Active Problem List   Diagnosis   • Protein-calorie malnutrition, unspecified severity ([facility] )   • Coronary artery disease involving native heart without angina pectoris   • Paroxysmal A-fib (HCC)   • Mixed hyperlipidemia   • AICD (automatic cardioverter/defibrillator) present   • Chronic diastolic congestive heart failure ([facility] )   • Gastroesophageal reflux disease   • Chest pain   • Anemia in other chronic diseases classified elsewhere   • 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[facility] )   • Upper abdominal pain   • Ascending aorta dilation (HCC)       Objective   /72 (BP Location: Left arm, Patient Position: Sitting, Cuff Size: Standard)   Temp 97 5 °F (36 4 °C) (Tympanic)   Ht 6' 2\" (1 88 m)   Wt 71 1 kg (156 lb 12 8 oz)   BMI 20 13 kg/m²     Physical Exam  Vitals reviewed  Constitutional:       General: He is not in acute distress  Appearance: Normal appearance  He is not ill-appearing  HENT:      Head: Normocephalic and atraumatic  Right Ear: Tympanic membrane, ear canal and external ear normal       Left Ear: Tympanic membrane, ear canal and external ear normal       Nose: Nose normal       Mouth/Throat:      Mouth: Mucous membranes are moist       Pharynx: Oropharynx is clear  Eyes:      Conjunctiva/sclera: Conjunctivae normal       Pupils: Pupils are equal, round, and reactive to light  Neck:      Vascular: No carotid bruit  Cardiovascular:      Rate and Rhythm: Normal rate and regular rhythm  Pulses: Normal pulses  Heart sounds: Normal heart sounds  Pulmonary:      Effort: Pulmonary effort is normal       Breath sounds: Normal breath sounds  Abdominal:      General: Bowel sounds are normal       Palpations: Abdomen is soft  There is no mass  Tenderness: There is no abdominal tenderness  Musculoskeletal:         General: Normal range of motion  Cervical back: Normal range of motion and neck supple   " Right lower leg: No edema  Left lower leg: No edema  Skin:     General: Skin is warm and dry  Capillary Refill: Capillary refill takes less than 2 seconds  Neurological:      General: No focal deficit present  Mental Status: He is alert and oriented to person, place, and time     Psychiatric:         Mood and Affect: Mood normal          Behavior: Behavior normal          Pertinent Laboratory/Diagnostic Studies:  CBC:   Lab Results   Component Value Date/Time    WBC 4 06 (L) 03/21/2023 05:49 AM    RBC 3 87 (L) 03/21/2023 05:49 AM    HGB 11 1 (L) 03/21/2023 05:49 AM    HCT 33 8 (L) 03/21/2023 05:49 AM    MCV 87 03/21/2023 05:49 AM    MCH 28 7 03/21/2023 05:49 AM    MCHC 32 8 03/21/2023 05:49 AM    RDW 15 2 (H) 03/21/2023 05:49 AM    MPV 10 6 03/21/2023 05:49 AM     (L) 03/21/2023 05:49 AM    NRBC 0 03/21/2023 05:49 AM    NEUTOPHILPCT 30 (L) 03/21/2023 05:49 AM    LYMPHOPCT 59 (H) 03/21/2023 05:49 AM    MONOPCT 8 03/21/2023 05:49 AM    EOSPCT 2 03/21/2023 05:49 AM    BASOPCT 1 03/21/2023 05:49 AM    NEUTROABS 1 23 (L) 03/21/2023 05:49 AM    LYMPHSABS 2 41 03/21/2023 05:49 AM    MONOSABS 0 33 03/21/2023 05:49 AM    EOSABS 0 06 03/21/2023 05:49 AM     Chemistry Profile:   Lab Results   Component Value Date/Time    K 4 0 03/21/2023 05:49 AM     03/21/2023 05:49 AM    CO2 28 03/21/2023 05:49 AM    BUN 10 03/21/2023 05:49 AM    CREATININE 1 04 03/21/2023 05:49 AM    GLUC 89 03/21/2023 05:49 AM    GLUF 83 03/20/2023 05:01 AM    CALCIUM 9 0 03/21/2023 05:49 AM    CORRECTEDCA 9 6 03/21/2023 05:49 AM    MG 2 4 10/28/2022 06:11 AM    AST 19 03/21/2023 05:49 AM    ALT 26 03/21/2023 05:49 AM    ALKPHOS 36 03/21/2023 05:49 AM    EGFR 66 03/21/2023 05:49 AM     Coagulation Studies:   Lab Results   Component Value Date/Time    PROTIME 16 3 (H) 03/21/2023 05:49 AM    INR 1 34 (H) 03/21/2023 05:49 AM    PTT 31 10/27/2022 04:46 AM     Cardiac Studies:   Lab Results   Component Value Date/Time NTBNP 151 10/27/2022 04:46 AM    BNP 38 03/19/2023 12:27 PM     Hepatology:   Lab Results   Component Value Date/Time    LIPASE 12 03/20/2023 05:01 AM     Endocrine Studies:   Lab Results   Component Value Date/Time    HGBA1C 5 0 02/02/2023 09:36 AM    [id number] 2 338 10/28/2022 06:11 AM    TRIG 71 02/02/2023 09:36 AM    CHOLESTEROL 139 02/02/2023 09:36 AM    HDL 66 02/02/2023 09:36 AM    LDLCALC 59 02/02/2023 09:36 AM    NONHDLC 73 02/02/2023 09:36 AM     Health Maintenance:   Lab Results   Component Value Date/Time    PSA 4 6 (H) 09/07/2021 06:37 AM     Urine Protein/Creatinine Ratio:   Lab Results   Component Value Date/Time    CREATININEUR 131 0 09/07/2021 08:06 AM    LABMICR 16 0 09/07/2021 08:06 AM    MICROALBCRE 12 09/07/2021 08:06 AM     Urinalysis:   Lab Results   Component Value Date/Time    COLORU Yellow 12/21/2021 04:48 AM    CLARITYU Clear 12/21/2021 04:48 AM    SPECGRAV 1 010 12/21/2021 04:48 AM    PHUR 6 0 12/21/2021 04:48 AM    LEUKOCYTESUR (A) 12/21/2021 04:48 AM     Elevated glucose may cause decreased leukocyte values  See urine microscopic for [facility] result/    NITRITE Negative 12/21/2021 04:48 AM    GLUCOSEU >=1000 (1%) (A) 12/21/2021 04:48 AM    KETONESU Negative 12/21/2021 04:48 AM    BILIRUBINUR Negative 12/21/2021 04:48 AM    BLOODU Negative 12/21/2021 04:48 AM      Urine Micro:   Lab Results   Component Value Date/Time    RBCUA None Seen 12/21/2021 04:48 AM    WBCUA None Seen 12/21/2021 04:48 AM    EPIS None Seen 12/21/2021 04:48 AM    BACTERIA None Seen 12/21/2021 04:48 AM     Cultures:   Lab Results   Component Value Date/Time    BLOODCX No Growth After 5 Days  10/27/2022 05:40 PM    BLOODCX No Growth After 5 Days  10/27/2022 05:40 PM        Imaging: X-ray chest 1 view portable    Result Date: 3/20/2023  Narrative: CHEST INDICATION:   chest pain  COMPARISON:  2/1/2023 EXAM PERFORMED/VIEWS:  XR CHEST PORTABLE FINDINGS:  ICD transvenous pacemaker present   Cardiomediastinal silhouette appears unremarkable  The lungs are clear  No pneumothorax or pleural effusion  Osseous structures appear within normal limits for patient age  Impression: No acute cardiopulmonary disease  Workstation performed: [id number]     CT chest abdomen pelvis w contrast    Result Date: 3/21/2023  Narrative: CT CHEST, ABDOMEN AND PELVIS WITH IV CONTRAST INDICATION:   Pain in the transition of lower chest - upper abdomen with deep inspiration and tenderness to palpation of epigastric area, r/o mediastinitis, pancreatitis, abdominal pathology  COMPARISON:  October 27, 2022 TECHNIQUE: CT examination of the chest, abdomen and pelvis was performed  Axial, sagittal, and coronal 2D reformatted images were created from the source data and submitted for interpretation  Radiation dose length product (DLP) for this visit:  621 mGy-cm   This examination, like all CT scans performed in the [facility], was performed utilizing techniques to minimize radiation dose exposure, including the use of iterative reconstruction and automated exposure control  IV Contrast:  100 mL of iohexol (OMNIPAQUE) Enteric Contrast: Enteric contrast was administered  FINDINGS: CHEST LUNGS:  Right basilar density seen due to atelectasis Left basilar density seen due to atelectasis Trachea and central bronchi are patent No acute consolidation seen PLEURA:  Mild right basilar pleural thickening HEART/GREAT VESSELS: Ascending aorta measures 4 5 cm  The brachiocephalic, left common carotid artery, subclavian arteries are patent  Pacemaker/AICD seen MEDIASTINUM AND STACEY:  No significant mediastinal lymph node enlargement seen No significant mediastinal lymph node enlargement seen CHEST WALL AND LOWER NECK:  No significant axillary lymph node enlargement seen No significant lymphadenopathy in the lower neck ABDOMEN LIVER/BILIARY TREE:  No focal liver lesion seen GALLBLADDER:  No calcified gallstones  No pericholecystic inflammatory change  SPLEEN:  Unremarkable  PANCREAS:  No pancreatic ductal dilation and no pancreatic atrophy ADRENAL GLANDS:  Unremarkable  KIDNEYS/URETERS:  Unremarkable  No hydronephrosis  STOMACH AND BOWEL:  Small segment of rectal thickening seen, image 215 series 2  Mild gastric wall thickening No abnormal dilation of the small bowel loops seen APPENDIX:  No findings to suggest appendicitis  ABDOMINOPELVIC CAVITY:  No ascites  No pneumoperitoneum  No lymphadenopathy  VESSELS:  Atherosclerotic changes are seen within the aorta Mild atherosclerotic plaquing in the ascending and at the origin of the celiac trunk PELVIS REPRODUCTIVE ORGANS:  Enlarged prostate seen URINARY BLADDER:  Mild bladder wall thickening ABDOMINAL WALL/INGUINAL REGIONS:  There are postsurgical changes from prior right inguinal hernia repair OSSEOUS STRUCTURES:  No acute compression collapse vertebra     Impression: No acute inflammatory stranding No evidence of pancreatitis, mediastinitis Small segment of the rectal thickening, can be evaluated with colonoscopy performed on nonemergent basis Mild gastric wall thickening, evaluate for gastritis Incidentally noted is a 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 Surveillance suggested Enlarged prostate The study was marked in EPIC for significant notification   Workstation performed: [id number]     Current Medications     Current Outpatient Medications:   •  aluminum-magnesium hydroxide-simethicone (MYLANTA) 0940-7572-433 mg/30 mL suspension, Take 30 mL by mouth 3 (three) times a day for 10 days, Disp: 355 mL, Rfl: 0  •  amiodarone 200 mg tablet, TAKE 1 TABLET BY MOUTH EVERY DAY, Disp: 90 tablet, Rfl: 3  •  apixaban (ELIQUIS) 5 mg, Take 1 tablet (5 mg total) by mouth 2 (two) times a day, Disp: 90 tablet, Rfl: 3  •  aspirin (ECOTRIN LOW STRENGTH) 81 mg EC tablet, Take 81 mg by mouth daily, Disp: , Rfl:   •  atorvastatin (LIPITOR) 20 mg tablet, Take 1 tablet (20 mg total) by mouth daily at bedtime, Disp: , Rfl:   •  digoxin (LANOXIN) 0 125 mg tablet, Take 1 tablet (125 mcg total) by mouth daily, Disp: 90 tablet, Rfl: 3  •  Farxiga 10 MG tablet, TAKE 1 TABLET BY MOUTH EVERY DAY (Patient taking differently: Take 10 mg by mouth every other day), Disp: 90 tablet, Rfl: 3  •  Multiple Vitamin (Daily-Misael Multivitamin) TABS, TAKE 1 TABLET BY MOUTH EVERY DAY, Disp: 90 tablet, Rfl: 1  •  pantoprazole (PROTONIX) 40 mg tablet, Take 1 tablet (40 mg total) by mouth 2 (two) times a day, Disp: 60 tablet, Rfl: 0  •  sacubitril-valsartan (Entresto) 49-51 MG TABS, Take 1 tablet by mouth 2 (two) times a day, Disp: 180 tablet, Rfl: 3  •  tamsulosin (FLOMAX) 0 4 mg, Take 1 capsule (0 4 mg total) by mouth daily with dinner, Disp: 90 capsule, Rfl: 3    Abbie Barrera, [address]

## 2023-03-28 NOTE — ASSESSMENT & PLAN NOTE
Incidental finding during recent hospitalization of 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  Will refer to vascular

## 2023-03-28 NOTE — ASSESSMENT & PLAN NOTE
Patient notes improvement with initiation of Mylanta and Protonix  Educated patient on avoiding spicy/acidic foods, eating smaller, more frequent meals, avoiding caffeine avoiding eating heavy meals then going to lay down

## 2023-03-28 NOTE — ASSESSMENT & PLAN NOTE
Wt Readings from Last 3 Encounters:   03/28/23 71 1 kg (156 lb 12 8 oz)   03/19/23 71 kg (156 lb 8 4 oz)   02/23/23 70 6 kg (155 lb 9 6 oz)     Stable  Denies recent exacerbations  Advised patient to question cardiology if he still needs to be on [place]  As per patient, was on medication by previous provider in [place]  To continue on Entresto  Continue f/u with cardiology

## 2023-03-29 DIAGNOSIS — E78.2 MIXED HYPERLIPIDEMIA: ICD-10-CM

## 2023-03-29 RX ORDER — ATORVASTATIN CALCIUM 20 MG/1
TABLET, FILM COATED ORAL
Qty: 90 TABLET | Refills: 3 | Status: SHIPPED | OUTPATIENT
Start: 2023-03-29

## 2023-05-02 ENCOUNTER — OFFICE VISIT (OUTPATIENT)
Dept: UROLOGY | Facility: CLINIC | Age: 83
End: 2023-05-02

## 2023-05-02 VITALS
WEIGHT: 158 LBS | HEART RATE: 96 BPM | DIASTOLIC BLOOD PRESSURE: 60 MMHG | RESPIRATION RATE: 18 BRPM | BODY MASS INDEX: 20.28 KG/M2 | HEIGHT: 74 IN | SYSTOLIC BLOOD PRESSURE: 100 MMHG | OXYGEN SATURATION: 99 %

## 2023-05-02 DIAGNOSIS — N40.0 ENLARGED PROSTATE: Primary | ICD-10-CM

## 2023-05-02 NOTE — PROGRESS NOTES
"5/2/2023      Chief Complaint   Patient presents with    Follow-up         Assessment and Plan    80 y o  male     1  BPH with LUTS  - Doing well with Flomax  - Denies any new or worsening symptoms  - Follow up in 1 year for symptom reassessment  - Call with any questions or concerns in the meantime  - All questions answered; patient understands and agrees with plan     2  Hypotension  - BP 86/60  - Repeat /60  - Follow up with PCP      History of Present Illness  Kerri Goodpasture Drummings is a 80 y o  male patient with history of BPH with LUTS here for follow up  Patient doing well with Flomax  Denies any new or worsening symptoms  Denies frequency, urgency, dysuria, gross hematuria, flank pain, suprapubic pain, fevers, chills   Patient does not wish to have surgery for prostate  Overall happy with urination at this time      Review of Systems   Constitutional: Negative for activity change, appetite change, chills and fever  HENT: Negative for congestion and trouble swallowing  Respiratory: Negative for cough and shortness of breath  Cardiovascular: Negative for chest pain, palpitations and leg swelling  Gastrointestinal: Negative for abdominal pain, constipation, diarrhea, nausea and vomiting  Genitourinary: Negative for difficulty urinating, dysuria, flank pain, frequency, hematuria and urgency  Musculoskeletal: Negative for back pain and gait problem  Skin: Negative for wound  Allergic/Immunologic: Negative for immunocompromised state  Neurological: Negative for dizziness and syncope  Hematological: Does not bruise/bleed easily  Psychiatric/Behavioral: Negative for confusion  All other systems reviewed and are negative        Vitals  Vitals:    05/02/23 1124 05/02/23 1147   BP: (!) 86/60 100/60   BP Location: Left arm Left arm   Patient Position: Sitting Sitting   Cuff Size: Adult Adult   Pulse: 96    Resp: 18    SpO2: 99%    Weight: 71 7 kg (158 lb)    Height: 6' 2\" (1 88 m)  " Physical Exam  Constitutional:       General: He is not in acute distress  Appearance: Normal appearance  He is not ill-appearing, toxic-appearing or diaphoretic  HENT:      Head: Normocephalic  Nose: No congestion  Eyes:      General: No scleral icterus  Right eye: No discharge  Left eye: No discharge  Conjunctiva/sclera: Conjunctivae normal       Pupils: Pupils are equal, round, and reactive to light  Pulmonary:      Effort: Pulmonary effort is normal    Musculoskeletal:      Cervical back: Normal range of motion  Skin:     General: Skin is warm and dry  Coloration: Skin is not jaundiced or pale  Findings: No bruising, erythema, lesion or rash  Neurological:      General: No focal deficit present  Mental Status: He is alert and oriented to person, place, and time  Mental status is at baseline  Gait: Gait normal    Psychiatric:         Mood and Affect: Mood normal          Behavior: Behavior normal          Thought Content: Thought content normal          Judgment: Judgment normal            Past History  Past Medical History:   Diagnosis Date    [facility]congestive heart failure) ([facility])     Hyperlipidemia     Hypertension     MI (myocardial infarction) ([facility] [address] [facility]     Presence of cardiac defibrillator     Prostate disorder      Social History     Socioeconomic History    Marital status:       Spouse name: None    Number of children: None    Years of education: None    Highest education level: None   Occupational History    None   Tobacco Use    Smoking status: Never    Smokeless tobacco: Never   Vaping Use    Vaping Use: Never used   Substance and Sexual Activity    Alcohol use: Not Currently    Drug use: Never    Sexual activity: None   Other Topics Concern    None   Social History Narrative    None     Social Determinants of Health     Financial Resource Strain: Medium Risk    Difficulty of Paying Living Expenses: Somewhat hard Food Insecurity: No Food Insecurity    Worried About Running Out of Food in the Last Year: Never true    Thomas of Food in the Last Year: Never true   Transportation Needs: No Transportation Needs    Lack of Transportation (Medical): No    Lack of Transportation (Non-Medical): No   Physical Activity: Not on file   Stress: Not on file   Social Connections: Not on file   Intimate Partner Violence: Not on file   Housing Stability: Low Risk     Unable to Pay for Housing in the Last Year: No    Number of Places Lived in the Last Year: 1    Unstable Housing in the Last Year: No     Social History     Tobacco Use   Smoking Status Never   Smokeless Tobacco Never     History reviewed  No pertinent family history      The following portions of the patient's history were reviewed and updated as appropriate: allergies, current medications, past medical history, past social history, past surgical history and problem list     Results  No results found for this or any previous visit (from the past 1 hour(s)) ]  Lab Results   Component Value Date    PSA 4 6 (H) 09/07/2021     Lab Results   Component Value Date    CALCIUM 9 0 03/21/2023    K 4 0 03/21/2023    CO2 28 03/21/2023     03/21/2023    BUN 10 03/21/2023    CREATININE 1 04 03/21/2023     Lab Results   Component Value Date    WBC 4 06 (L) 03/21/2023    HGB 11 1 (L) 03/21/2023    HCT 33 8 (L) 03/21/2023    MCV 87 03/21/2023     (L) 03/21/2023       Kevin Nix PA-C

## 2023-05-09 DIAGNOSIS — K29.70 GASTRITIS: ICD-10-CM

## 2023-05-09 RX ORDER — PANTOPRAZOLE SODIUM 40 MG/1
TABLET, DELAYED RELEASE ORAL
Qty: 180 TABLET | Refills: 1 | Status: SHIPPED | OUTPATIENT
Start: 2023-05-09

## 2023-05-24 ENCOUNTER — TELEPHONE (OUTPATIENT)
Dept: CARDIOLOGY CLINIC | Facility: CLINIC | Age: 83
End: 2023-05-24

## 2023-05-24 DIAGNOSIS — I50.20 SYSTOLIC HEART FAILURE, UNSPECIFIED HF CHRONICITY (HCC): ICD-10-CM

## 2023-05-24 NOTE — TELEPHONE ENCOUNTER
Patient was scheduled with Dr Honey Redd for a routine follow up on May 31st   Had to be rescheduled to July  He would like to speak to someone so he can go over his medications      [phone]

## 2023-05-25 NOTE — TELEPHONE ENCOUNTER
Spoke with patient and he ask if he can stop taking any of his medication he been on his medication for a long time       Med list up dated

## 2023-05-28 DIAGNOSIS — I48.0 PAROXYSMAL A-FIB (HCC): ICD-10-CM

## 2023-05-30 RX ORDER — APIXABAN 5 MG/1
TABLET, FILM COATED ORAL
Qty: 90 TABLET | Refills: 3 | Status: SHIPPED | OUTPATIENT
Start: 2023-05-30

## 2023-06-28 ENCOUNTER — OFFICE VISIT (OUTPATIENT)
Dept: FAMILY MEDICINE CLINIC | Facility: CLINIC | Age: 83
End: 2023-06-28
Payer: COMMERCIAL

## 2023-06-28 VITALS
OXYGEN SATURATION: 100 % | SYSTOLIC BLOOD PRESSURE: 104 MMHG | TEMPERATURE: 98.3 F | HEART RATE: 75 BPM | BODY MASS INDEX: 22.56 KG/M2 | WEIGHT: 161.13 LBS | DIASTOLIC BLOOD PRESSURE: 60 MMHG | HEIGHT: 71 IN

## 2023-06-28 DIAGNOSIS — I50.20 SYSTOLIC HEART FAILURE, UNSPECIFIED HF CHRONICITY (HCC): ICD-10-CM

## 2023-06-28 DIAGNOSIS — I50.32 CHRONIC DIASTOLIC CONGESTIVE HEART FAILURE (HCC): ICD-10-CM

## 2023-06-28 DIAGNOSIS — R10.10 UPPER ABDOMINAL PAIN: ICD-10-CM

## 2023-06-28 DIAGNOSIS — R10.84 GENERALIZED ABDOMINAL PAIN: Primary | ICD-10-CM

## 2023-06-28 DIAGNOSIS — Z00.00 HEALTH CARE MAINTENANCE: ICD-10-CM

## 2023-06-28 DIAGNOSIS — I42.8 OTHER CARDIOMYOPATHY (HCC): ICD-10-CM

## 2023-06-28 PROCEDURE — 99214 OFFICE O/P EST MOD 30 MIN: CPT | Performed by: NURSE PRACTITIONER

## 2023-06-28 RX ORDER — SACUBITRIL AND VALSARTAN 49; 51 MG/1; MG/1
1 TABLET, FILM COATED ORAL 2 TIMES DAILY
Qty: 180 TABLET | Refills: 3 | Status: SHIPPED | OUTPATIENT
Start: 2023-06-28

## 2023-06-28 NOTE — PATIENT INSTRUCTIONS
Obtain fasting labs, nothing to eat after midnight, may drink water     Follow up with gastroenterology

## 2023-06-29 PROBLEM — R10.84 GENERALIZED ABDOMINAL PAIN: Status: ACTIVE | Noted: 2023-06-29

## 2023-06-29 NOTE — PROGRESS NOTES
Name: Iona Loera      : 1940      MRN: [id number]  Encounter Provider: Aretha Blizzard, CRNP  Encounter Date: 2023   Encounter department: [address]     1  Generalized abdominal pain  -     CBC and differential; Future  -     Helicobacter pylori, IgA; Future  -     Ambulatory Referral to Gastroenterology; Future    2  Systolic heart failure, unspecified HF chronicity (HCC)  -     dapagliflozin (Farxiga) 10 MG tablet; Take 1 tablet (10 mg total) by mouth daily    3  Other cardiomyopathy ([facility] [address] )  -     sacubitril-valsartan (Entresto) 49-51 MG TABS; Take 1 tablet by mouth 2 (two) times a day    4  Health care maintenance  -     Comprehensive metabolic panel; Future  -     Lipid panel; Future    5  Upper abdominal pain  Assessment & Plan:  And has been on Protonix, Pepto-Bismol x1 month  Reports no improvement  Blood work ordered  Referral made to gastroenterology  6  Chronic diastolic congestive heart failure [facility])  Assessment & Plan:  Wt Readings from Last 3 Encounters:   23 73 1 kg (161 lb 2 oz)   23 71 7 kg (158 lb)   23 71 1 kg (156 lb 12 8 oz)     Patient has been doing well medications  Denies any symptoms  Continue medication  Discussed management of heart failure  Subjective      Patient is here to establish care with this provider  He is retired, was a  more than 50 years  History of CHF and CAD  Recently moved from [facility] to live with his daughter  Continues to have some abdominal discomfort  Reports acute pain, has been on Protonix also 1 month of Pepto-Bismol, no relief  Review of Systems   Constitutional: Negative  HENT: Negative  Eyes: Negative  Respiratory: Negative  Cardiovascular: Negative  Gastrointestinal: Positive for abdominal distention and abdominal pain  Negative for nausea and vomiting  Endocrine: Negative  Genitourinary: Negative  "  Musculoskeletal: Negative  Skin: Negative  Allergic/Immunologic: Negative  Neurological: Negative  Hematological: Negative  Psychiatric/Behavioral: Negative  Current Outpatient Medications on File Prior to Visit   Medication Sig   • amiodarone 200 mg tablet TAKE 1 TABLET BY MOUTH EVERY DAY   • aspirin (ECOTRIN LOW STRENGTH) 81 mg EC tablet Take 81 mg by mouth daily   • atorvastatin (LIPITOR) 20 mg tablet TAKE 1 TABLET BY MOUTH EVERY DAY   • digoxin (LANOXIN) 0 125 mg tablet Take 1 tablet (125 mcg total) by mouth daily   • Eliquis 5 MG TAKE 1 TABLET BY MOUTH TWICE A DAY   • Multiple Vitamin (Daily-Misael Multivitamin) TABS TAKE 1 TABLET BY MOUTH EVERY DAY   • pantoprazole (PROTONIX) 40 mg tablet TAKE 1 TABLET BY MOUTH TWICE A DAY   • tamsulosin (FLOMAX) 0 4 mg Take 1 capsule (0 4 mg total) by mouth daily with dinner       Objective     /60 (BP Location: Left arm, Patient Position: Sitting, Cuff Size: Adult)   Pulse 75   Temp 98 3 °F (36 8 °C) (Temporal)   Ht 5' 11 25\" (1 81 m)   Wt 73 1 kg (161 lb 2 oz)   SpO2 100%   BMI 22 31 kg/m²     Physical Exam  Vitals and nursing note reviewed  Constitutional:       Appearance: Normal appearance  He is well-developed  HENT:      Head: Normocephalic and atraumatic  Cardiovascular:      Rate and Rhythm: Normal rate and regular rhythm  Pulses: Normal pulses  Heart sounds: Normal heart sounds  Comments: Patient has pacemaker  Pulmonary:      Effort: Pulmonary effort is normal       Breath sounds: Normal breath sounds  Abdominal:      Palpations: Abdomen is soft  There is no mass  Tenderness: There is no right CVA tenderness or left CVA tenderness  Hernia: No hernia is present  Musculoskeletal:         General: Normal range of motion  Cervical back: Normal range of motion  Skin:     General: Skin is warm and dry  Neurological:      General: No focal deficit present        Mental Status: He is alert and " oriented to person, place, and time  Psychiatric:         Mood and Affect: Mood normal          Behavior: Behavior normal          Thought Content:  Thought content normal          Judgment: Judgment normal        OSKAR Du negative detailed exam

## 2023-06-29 NOTE — ASSESSMENT & PLAN NOTE
And has been on Protonix, Pepto-Bismol x1 month  Reports no improvement  Blood work ordered  Referral made to gastroenterology

## 2023-06-29 NOTE — ASSESSMENT & PLAN NOTE
Wt Readings from Last 3 Encounters:   06/28/23 73 1 kg (161 lb 2 oz)   05/02/23 71 7 kg (158 lb)   03/28/23 71 1 kg (156 lb 12 8 oz)     Patient has been doing well medications  Denies any symptoms  Continue medication  Discussed management of heart failure

## 2023-07-05 ENCOUNTER — APPOINTMENT (OUTPATIENT)
Dept: LAB | Facility: CLINIC | Age: 83
End: 2023-07-05
Payer: COMMERCIAL

## 2023-07-05 DIAGNOSIS — Z00.00 HEALTH CARE MAINTENANCE: ICD-10-CM

## 2023-07-05 DIAGNOSIS — R10.84 GENERALIZED ABDOMINAL PAIN: ICD-10-CM

## 2023-07-05 LAB
ALBUMIN SERPL BCP-MCNC: 3.6 G/DL (ref 3.5–5)
ALP SERPL-CCNC: 55 U/L (ref 46–116)
ALT SERPL W P-5'-P-CCNC: 38 U/L (ref 12–78)
ANION GAP SERPL CALCULATED.3IONS-SCNC: 1 MMOL/L
AST SERPL W P-5'-P-CCNC: 26 U/L (ref 5–45)
BASOPHILS # BLD AUTO: 0.02 THOUSANDS/ÂΜL (ref 0–0.1)
BASOPHILS NFR BLD AUTO: 1 % (ref 0–1)
BILIRUB SERPL-MCNC: 1.09 MG/DL (ref 0.2–1)
BUN SERPL-MCNC: 12 MG/DL (ref 5–25)
CALCIUM SERPL-MCNC: 8.8 MG/DL (ref 8.3–10.1)
CHLORIDE SERPL-SCNC: 107 MMOL/L (ref 96–108)
CHOLEST SERPL-MCNC: 135 MG/DL
CO2 SERPL-SCNC: 28 MMOL/L (ref 21–32)
CREAT SERPL-MCNC: 1.15 MG/DL (ref 0.6–1.3)
EOSINOPHIL # BLD AUTO: 0.04 THOUSAND/ÂΜL (ref 0–0.61)
EOSINOPHIL NFR BLD AUTO: 1 % (ref 0–6)
ERYTHROCYTE [DISTWIDTH] IN BLOOD BY AUTOMATED COUNT: 15.4 % (ref 11.6–15.1)
GFR SERPL CREATININE-BSD FRML MDRD: 58 ML/MIN/1.73SQ M
GLUCOSE P FAST SERPL-MCNC: 102 MG/DL (ref 65–99)
HCT VFR BLD AUTO: 41.1 % (ref 36.5–49.3)
HDLC SERPL-MCNC: 66 MG/DL
HGB BLD-MCNC: 12.7 G/DL (ref 12–17)
IMM GRANULOCYTES # BLD AUTO: 0.01 THOUSAND/UL (ref 0–0.2)
IMM GRANULOCYTES NFR BLD AUTO: 0 % (ref 0–2)
LDLC SERPL CALC-MCNC: 53 MG/DL (ref 0–100)
LYMPHOCYTES # BLD AUTO: 1.48 THOUSANDS/ÂΜL (ref 0.6–4.47)
LYMPHOCYTES NFR BLD AUTO: 42 % (ref 14–44)
MCH RBC QN AUTO: 28.5 PG (ref 26.8–34.3)
MCHC RBC AUTO-ENTMCNC: 30.9 G/DL (ref 31.4–37.4)
MCV RBC AUTO: 92 FL (ref 82–98)
MONOCYTES # BLD AUTO: 0.29 THOUSAND/ÂΜL (ref 0.17–1.22)
MONOCYTES NFR BLD AUTO: 8 % (ref 4–12)
NEUTROPHILS # BLD AUTO: 1.7 THOUSANDS/ÂΜL (ref 1.85–7.62)
NEUTS SEG NFR BLD AUTO: 48 % (ref 43–75)
NONHDLC SERPL-MCNC: 69 MG/DL
NRBC BLD AUTO-RTO: 0 /100 WBCS
PLATELET # BLD AUTO: 148 THOUSANDS/UL (ref 149–390)
PMV BLD AUTO: 12.5 FL (ref 8.9–12.7)
POTASSIUM SERPL-SCNC: 4 MMOL/L (ref 3.5–5.3)
PROT SERPL-MCNC: 9.2 G/DL (ref 6.4–8.4)
RBC # BLD AUTO: 4.46 MILLION/UL (ref 3.88–5.62)
SODIUM SERPL-SCNC: 136 MMOL/L (ref 135–147)
TRIGL SERPL-MCNC: 78 MG/DL
WBC # BLD AUTO: 3.54 THOUSAND/UL (ref 4.31–10.16)

## 2023-07-05 PROCEDURE — 80053 COMPREHEN METABOLIC PANEL: CPT

## 2023-07-05 PROCEDURE — 86677 HELICOBACTER PYLORI ANTIBODY: CPT

## 2023-07-05 PROCEDURE — 36415 COLL VENOUS BLD VENIPUNCTURE: CPT

## 2023-07-05 PROCEDURE — 80061 LIPID PANEL: CPT

## 2023-07-05 PROCEDURE — 85025 COMPLETE CBC W/AUTO DIFF WBC: CPT

## 2023-07-08 DIAGNOSIS — E78.2 MIXED HYPERLIPIDEMIA: ICD-10-CM

## 2023-07-08 RX ORDER — ATORVASTATIN CALCIUM 20 MG/1
TABLET, FILM COATED ORAL
Qty: 90 TABLET | Refills: 3 | Status: SHIPPED | OUTPATIENT
Start: 2023-07-08

## 2023-07-10 LAB — MISCELLANEOUS LAB TEST RESULT: NORMAL

## 2023-07-12 DIAGNOSIS — N40.0 ENLARGED PROSTATE: ICD-10-CM

## 2023-07-12 DIAGNOSIS — I48.0 PAROXYSMAL A-FIB (HCC): ICD-10-CM

## 2023-07-12 RX ORDER — DIGOXIN 125 MCG
TABLET ORAL
Qty: 90 TABLET | Refills: 3 | Status: SHIPPED | OUTPATIENT
Start: 2023-07-12

## 2023-07-12 RX ORDER — TAMSULOSIN HYDROCHLORIDE 0.4 MG/1
CAPSULE ORAL
Qty: 90 CAPSULE | Refills: 3 | Status: SHIPPED | OUTPATIENT
Start: 2023-07-12

## 2023-07-12 NOTE — TELEPHONE ENCOUNTER
Patient is requesting med refill      Patient last Ov was 2/23/23 with . Patient also saw  on 11/23/22 for a Ov. Please advise.

## 2023-07-26 ENCOUNTER — OFFICE VISIT (OUTPATIENT)
Dept: FAMILY MEDICINE CLINIC | Facility: CLINIC | Age: 83
End: 2023-07-26
Payer: COMMERCIAL

## 2023-07-26 VITALS
HEART RATE: 80 BPM | TEMPERATURE: 97.5 F | HEIGHT: 71 IN | DIASTOLIC BLOOD PRESSURE: 70 MMHG | SYSTOLIC BLOOD PRESSURE: 120 MMHG | OXYGEN SATURATION: 99 % | BODY MASS INDEX: 22.76 KG/M2 | WEIGHT: 162.6 LBS | RESPIRATION RATE: 16 BRPM

## 2023-07-26 DIAGNOSIS — E78.2 MIXED HYPERLIPIDEMIA: ICD-10-CM

## 2023-07-26 DIAGNOSIS — N18.31 STAGE 3A CHRONIC KIDNEY DISEASE (HCC): Primary | ICD-10-CM

## 2023-07-26 DIAGNOSIS — I50.32 CHRONIC DIASTOLIC CONGESTIVE HEART FAILURE (HCC): ICD-10-CM

## 2023-07-26 DIAGNOSIS — I25.10 CORONARY ARTERY DISEASE INVOLVING NATIVE HEART WITHOUT ANGINA PECTORIS, UNSPECIFIED VESSEL OR LESION TYPE: ICD-10-CM

## 2023-07-26 PROCEDURE — 99213 OFFICE O/P EST LOW 20 MIN: CPT | Performed by: NURSE PRACTITIONER

## 2023-07-26 NOTE — PATIENT INSTRUCTIONS
Continue medications  Get blood work at least a week before your next office visit  Increase fluid hydration  Cut back a little bit on the cookies and donuts continue your walks every day

## 2023-07-27 NOTE — ASSESSMENT & PLAN NOTE
Lab Results   Component Value Date    EGFR 58 07/05/2023    EGFR 66 03/21/2023    EGFR 75 03/20/2023    CREATININE 1.15 07/05/2023    CREATININE 1.04 03/21/2023    CREATININE 0.94 03/20/2023   Reviewed blood work, discussed management. Increase fluid hydration. Pt is on Entresto. Will recheck in 1 month.  If gfr worsens will evaluate the need to change medications

## 2023-07-27 NOTE — ASSESSMENT & PLAN NOTE
Wt Readings from Last 3 Encounters:   07/26/23 73.8 kg (162 lb 9.6 oz)   06/28/23 73.1 kg (161 lb 2 oz)   05/02/23 71.7 kg (158 lb)     Pt is nit symptomatic  Continue low salt heart healthy diet, continue medication, follow up with cardiology  Weight is stable

## 2023-07-27 NOTE — PROGRESS NOTES
Name: Ester Ruiz      : 1940      MRN: [id number]  Encounter Provider: OSKAR Mckenzie  Encounter Date: 2023   Encounter department: [facility] [address]     1. Stage 3a chronic kidney disease [facility])  Assessment & Plan:  Lab Results   Component Value Date    EGFR 58 2023    EGFR 66 2023    EGFR 75 2023    CREATININE 1.15 2023    CREATININE 1.04 2023    CREATININE 0.94 2023   Reviewed blood work, discussed management. Increase fluid hydration. Pt is on Entresto. Will recheck in 1 month. If gfr worsens will evaluate the need to change medications      2. Mixed hyperlipidemia  -     Lipid panel; Future; Expected date: 2024    3. 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 Chronic diastolic congestive heart failure (HCC)  Assessment & Plan:  Wt Readings from Last 3 Encounters:   23 73.8 kg (162 lb 9.6 oz)   23 73.1 kg (161 lb 2 oz)   23 71.7 kg (158 lb)     Pt is nit symptomatic  Continue low salt heart healthy diet, continue medication, follow up with cardiology  Weight is stable                 Subjective      Pt is here to follow up on chronic health conditions. Generally feels well. Review of Systems   Constitutional: Negative. HENT: Negative. Eyes: Negative. Respiratory: Negative. Cardiovascular: Negative. Gastrointestinal: Negative. Endocrine: Negative. Genitourinary: Negative. Musculoskeletal: Negative. Skin: Negative. Allergic/Immunologic: Negative. Neurological: Negative. Hematological: Negative. Psychiatric/Behavioral: Negative.         Current Outpatient Medications on File Prior to Visit   Medication Sig   • amiodarone 200 mg tablet TAKE 1 TABLET BY MOUTH EVERY DAY   • aspirin (ECOTRIN LOW STRENGTH) 81 mg EC tablet Take 81 mg by mouth daily   • atorvastatin (LIPITOR) 20 mg tablet TAKE 1 TABLET BY MOUTH EVERY DAY   • dapagliflozin (Farxiga) 10 MG tablet Take 1 tablet (10 mg total) by mouth daily   • digoxin (LANOXIN) 0.125 mg tablet TAKE 1 TABLET BY MOUTH EVERY DAY   • Eliquis 5 MG TAKE 1 TABLET BY MOUTH TWICE A DAY   • Multiple Vitamin (Daily-Misael Multivitamin) TABS TAKE 1 TABLET BY MOUTH EVERY DAY   • pantoprazole (PROTONIX) 40 mg tablet TAKE 1 TABLET BY MOUTH TWICE A DAY   • sacubitril-valsartan (Entresto) 49-51 MG TABS Take 1 tablet by mouth 2 (two) times a day   • tamsulosin (FLOMAX) 0.4 mg TAKE 1 CAPSULE BY MOUTH EVERY DAY WITH DINNER       Objective     /70   Pulse 80   Temp 97.5 °F (36.4 °C)   Resp 16   Ht 5' 11.25" (1.81 m)   Wt 73.8 kg (162 lb 9.6 oz)   SpO2 99%   BMI 22.52 kg/m²     Physical Exam  Vitals and nursing note reviewed. Constitutional:       Appearance: Normal appearance. He is well-developed. HENT:      Head: Normocephalic and atraumatic. Cardiovascular:      Rate and Rhythm: Normal rate and regular rhythm. Pulses: Normal pulses. Heart sounds: Normal heart sounds. Pulmonary:      Breath sounds: Normal breath sounds. Abdominal:      General: Abdomen is flat. Palpations: Abdomen is soft. Musculoskeletal:         General: Normal range of motion. Skin:     General: Skin is warm and dry. Neurological:      General: No focal deficit present. Mental Status: He is alert and oriented to person, place, and time. Psychiatric:         Mood and Affect: Mood normal.         Behavior: Behavior normal.         Thought Content:  Thought content normal.         Judgment: Judgment normal.       OSKAR Borges

## 2023-08-02 ENCOUNTER — CONSULT (OUTPATIENT)
Dept: GASTROENTEROLOGY | Facility: CLINIC | Age: 83
End: 2023-08-02
Payer: COMMERCIAL

## 2023-08-02 VITALS
HEIGHT: 71 IN | BODY MASS INDEX: 22.82 KG/M2 | DIASTOLIC BLOOD PRESSURE: 68 MMHG | SYSTOLIC BLOOD PRESSURE: 118 MMHG | OXYGEN SATURATION: 98 % | HEART RATE: 70 BPM | WEIGHT: 163 LBS

## 2023-08-02 DIAGNOSIS — R10.13 EPIGASTRIC PAIN: ICD-10-CM

## 2023-08-02 PROCEDURE — 99203 OFFICE O/P NEW LOW 30 MIN: CPT | Performed by: PHYSICIAN ASSISTANT

## 2023-08-02 NOTE — PROGRESS NOTES
[facility] - Outpatient Consultation  Julio Barrientos 80 y.o. male MRN: [id number]  Encounter: [id number]          ASSESSMENT AND PLAN:      1. 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 He reports he was hospitalized in March for this with primarily concerns that he was having a heart attack but he was seen by cardiology and cleared as well as given mylanta with significant relief in symptoms. He denies any associated N/V or dysphagia. He denies NSAID use besides his baby ASA. He is on Eliquis. He has been on pantoprazole 40 mg BID with significant improvement in his epigastric burning but will still get this at times. He denies any melena or hematochezia. 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 He has never had a colonoscopy. He attempted one in 2020 but during the prep passed out. REVIEW OF SYSTEMS:    CONSTITUTIONAL: Denies any fever, chills, rigors, and weight loss. HEENT: No earache or tinnitus. Denies hearing loss or visual disturbances. CARDIOVASCULAR: No chest pain or palpitations. RESPIRATORY: Denies any cough, hemoptysis, shortness of breath or dyspnea on exertion.   GASTROINTESTINAL: As noted in the History of Present Illness. GENITOURINARY: No problems with urination. Denies any hematuria or dysuria. NEUROLOGIC: No dizziness or vertigo, denies headaches. MUSCULOSKELETAL: Denies any muscle or joint pain. SKIN: Denies skin rashes or itching. ENDOCRINE: Denies excessive thirst. Denies intolerance to heat or cold. PSYCHOSOCIAL: Denies depression or anxiety. Denies any recent memory loss. Historical Information   Past Medical History:   Diagnosis Date   • CHF (congestive heart failure) (HCC)    • Hyperlipidemia    • Hypertension    • MI (myocardial infarction) ([address])    • Presence of cardiac defibrillator    • Prostate disorder      Past Surgical History:   Procedure Laterality Date   • CORONARY ANGIOPLASTY WITH STENT PLACEMENT       Social History   Social History     Substance and Sexual Activity   Alcohol Use Not Currently     Social History     Substance and Sexual Activity   Drug Use Never     Social History     Tobacco Use   Smoking Status Never   • Passive exposure: Never   Smokeless Tobacco Never     History reviewed. No pertinent family history. Meds/Allergies       Current Outpatient Medications:   •  amiodarone 200 mg tablet  •  aspirin (ECOTRIN LOW STRENGTH) 81 mg EC tablet  •  atorvastatin (LIPITOR) 20 mg tablet  •  dapagliflozin (Farxiga) 10 MG tablet  •  digoxin (LANOXIN) 0.125 mg tablet  •  Eliquis 5 MG  •  Multiple Vitamin (Daily-Misael Multivitamin) TABS  •  pantoprazole (PROTONIX) 40 mg tablet  •  sacubitril-valsartan (Entresto) 49-51 MG TABS  •  tamsulosin (FLOMAX) 0.4 mg    No Known Allergies        Objective     Blood pressure 118/68, pulse 70, height 5' 11" (1.803 m), weight 73.9 kg (163 lb), SpO2 98 %. Body mass index is 22.73 kg/m².         PHYSICAL EXAM:      General Appearance:   Alert, cooperative, no distress   HEENT:   Normocephalic, atraumatic, anicteric.     Neck:  Supple, symmetrical, trachea midline   Lungs:   Clear to auscultation bilaterally; no rales, rhonchi or wheezing; respirations unlabored    Heart[de-identified]   Regular rate and rhythm; no murmur, rub, or gallop. Abdomen:   Soft, non-tender, non-distended; normal bowel sounds; no masses, no organomegaly    Genitalia:   Deferred    Rectal:   Deferred    Extremities:  No cyanosis, clubbing or edema    Pulses:  2+ and symmetric    Skin:  No jaundice, rashes, or lesions    Lymph nodes:  No palpable cervical lymphadenopathy        Lab Results:   No visits with results within 1 Day(s) from this visit.    Latest known visit with results is:   Appointment on 07/05/2023   Component Date Value   • WBC 07/05/2023 3.54 (L)    • RBC 07/05/2023 4.46    • Hemoglobin 07/05/2023 12.7    • Hematocrit 07/05/2023 41.1    • MCV 07/05/2023 92    • MCH 07/05/2023 28.5    • MCHC 07/05/2023 30.9 (L)    • RDW 07/05/2023 15.4 (H)    • MPV 07/05/2023 12.5    • Platelets 52/16/3887 148 (L)    • nRBC 07/05/2023 0    • Neutrophils Relative 07/05/2023 48    • Immat GRANS % 07/05/2023 0    • Lymphocytes Relative 07/05/2023 42    • Monocytes Relative 07/05/2023 8    • Eosinophils Relative 07/05/2023 1    • Basophils Relative 07/05/2023 1    • Neutrophils Absolute 07/05/2023 1.70 (L)    • Immature Grans Absolute 07/05/2023 0.01    • Lymphocytes Absolute 07/05/2023 1.48    • Monocytes Absolute 07/05/2023 0.29    • Eosinophils Absolute 07/05/2023 0.04    • Basophils Absolute 07/05/2023 0.02    • Sodium 07/05/2023 136    • Potassium 07/05/2023 4.0    • Chloride 07/05/2023 107    • CO2 07/05/2023 28    • ANION GAP 07/05/2023 1    • BUN 07/05/2023 12    • Creatinine 07/05/2023 1.15    • Glucose, Fasting 07/05/2023 102 (H)    • Calcium 07/05/2023 8.8    • AST 07/05/2023 26    • ALT 07/05/2023 38    • Alkaline Phosphatase 07/05/2023 55    • Total Protein 07/05/2023 9.2 (H)    • Albumin 07/05/2023 3.6    • Total Bilirubin 07/05/2023 1.09 (H)    • eGFR 07/05/2023 58    • Cholesterol 07/05/2023 135    • Triglycerides 07/05/2023 78    • HDL, Direct 07/05/2023 66    • LDL Calculated 07/05/2023 53    • Non-HDL-Chol (CHOL-HDL) 07/05/2023 69    • Miscellaneous Lab Test R* 07/05/2023 see sherin report          Radiology Results:   No results found.

## 2023-09-07 DIAGNOSIS — K29.70 GASTRITIS: ICD-10-CM

## 2023-09-07 RX ORDER — PANTOPRAZOLE SODIUM 40 MG/1
40 TABLET, DELAYED RELEASE ORAL DAILY
Qty: 180 TABLET | Refills: 1 | Status: SHIPPED | OUTPATIENT
Start: 2023-09-07

## 2023-09-21 DIAGNOSIS — I25.10 ATHEROSCLEROSIS OF NATIVE CORONARY ARTERY OF NATIVE HEART WITHOUT ANGINA PECTORIS: ICD-10-CM

## 2023-09-21 DIAGNOSIS — Z95.810 AICD (AUTOMATIC CARDIOVERTER/DEFIBRILLATOR) PRESENT: ICD-10-CM

## 2023-09-21 DIAGNOSIS — I48.0 PAROXYSMAL A-FIB (HCC): ICD-10-CM

## 2023-09-22 RX ORDER — AMIODARONE HYDROCHLORIDE 200 MG/1
TABLET ORAL
Qty: 90 TABLET | Refills: 3 | Status: SHIPPED | OUTPATIENT
Start: 2023-09-22

## 2023-10-16 ENCOUNTER — OFFICE VISIT (OUTPATIENT)
Dept: CARDIOLOGY CLINIC | Facility: CLINIC | Age: 83
End: 2023-10-16
Payer: COMMERCIAL

## 2023-10-16 VITALS
HEART RATE: 74 BPM | HEIGHT: 71 IN | SYSTOLIC BLOOD PRESSURE: 118 MMHG | WEIGHT: 163 LBS | BODY MASS INDEX: 22.82 KG/M2 | DIASTOLIC BLOOD PRESSURE: 74 MMHG | OXYGEN SATURATION: 95 % | RESPIRATION RATE: 16 BRPM

## 2023-10-16 DIAGNOSIS — Z95.810 AICD (AUTOMATIC CARDIOVERTER/DEFIBRILLATOR) PRESENT: ICD-10-CM

## 2023-10-16 DIAGNOSIS — I25.10 CORONARY ARTERY DISEASE INVOLVING NATIVE CORONARY ARTERY OF NATIVE HEART WITHOUT ANGINA PECTORIS: ICD-10-CM

## 2023-10-16 DIAGNOSIS — I25.5 ISCHEMIC CARDIOMYOPATHY: ICD-10-CM

## 2023-10-16 DIAGNOSIS — I48.0 PAROXYSMAL ATRIAL FIBRILLATION (HCC): Primary | ICD-10-CM

## 2023-10-16 DIAGNOSIS — I48.0 PAROXYSMAL A-FIB (HCC): ICD-10-CM

## 2023-10-16 PROCEDURE — 93000 ELECTROCARDIOGRAM COMPLETE: CPT | Performed by: INTERNAL MEDICINE

## 2023-10-16 PROCEDURE — 99213 OFFICE O/P EST LOW 20 MIN: CPT | Performed by: INTERNAL MEDICINE

## 2023-10-16 NOTE — PROGRESS NOTES
Cardiology Follow Up    Alex Cervantes  1940  [id number]  [facility] [address] [place] [id number] [phone]    1. Paroxysmal atrial fibrillation (HCC)  -     POCT ECG    2. Coronary artery disease involving native coronary artery of native heart without angina pectoris    3. Ischemic cardiomyopathy    4. Paroxysmal A-fib ([address])    5. AICD (automatic cardioverter/defibrillator) present          Interval History:    Very pleasant 51-year-old retired  with multiple diagnoses. He has a history of myocardial infarction in the past and a depressed ejection fraction requiring AICD. Ejection fraction has returned to normal at 60%. He has problems with his legs when he walks but does not get exertional chest discomfort or shortness of breath. He has had electrical shocks in the past but none in the past year. He is on amiodarone and his TSH has been normal.  He is on a low dose. Patient Active Problem List   Diagnosis    Protein-calorie malnutrition, unspecified severity ([address])    Coronary artery disease involving native heart without angina pectoris    Paroxysmal A-fib (HCC)    Mixed hyperlipidemia    AICD (automatic cardioverter/defibrillator) present    Chronic diastolic congestive heart failure ([address])    Gastroesophageal reflux disease    Chest pain    Anemia in other chronic diseases classified elsewhere    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[address])    Upper abdominal pain    Ascending aorta dilation (HCC)    Generalized abdominal pain    Stage 3a chronic kidney disease ([address])     Past Medical History:   Diagnosis Date    CHF (congestive heart failure) ([address])     Hyperlipidemia     Hypertension     MI (myocardial infarction) ([address])     Presence of cardiac defibrillator     Prostate disorder      Social History     Socioeconomic History    Marital status:       Spouse name: Not on file    Number of children: Not on file    Years of education: Not on file    Highest education level: Not on file   Occupational History    Not on file   Tobacco Use    Smoking status: Never     Passive exposure: Never    Smokeless tobacco: Never   Vaping Use    Vaping Use: Never used   Substance and Sexual Activity    Alcohol use: Not Currently    Drug use: Never    Sexual activity: Not on file   Other Topics Concern    Not on file   Social History Narrative    Not on file     Social Determinants of Health     Financial Resource Strain: Medium Risk (2/1/2023)    Overall Financial Resource Strain (CARDIA)     Difficulty of Paying Living Expenses: Somewhat hard   Food Insecurity: No Food Insecurity (3/21/2023)    Hunger Vital Sign     Worried About Running Out of Food in the Last Year: Never true     Ran Out of Food in the Last Year: Never true   Transportation Needs: No Transportation Needs (3/21/2023)    PRAPARE - Transportation     Lack of Transportation (Medical): No     Lack of Transportation (Non-Medical): No   Physical Activity: Not on file   Stress: Not on file   Social Connections: Not on file   Intimate Partner Violence: Not on file   Housing Stability: Low Risk  (3/21/2023)    Housing Stability Vital Sign     Unable to Pay for Housing in the Last Year: No     Number of Places Lived in the Last Year: 1     Unstable Housing in the Last Year: No      History reviewed. No pertinent family history.   Past Surgical History:   Procedure Laterality Date    CORONARY ANGIOPLASTY WITH STENT PLACEMENT         Current Outpatient Medications:     amiodarone 200 mg tablet, TAKE 1 TABLET BY MOUTH EVERY DAY, Disp: 90 tablet, Rfl: 3    aspirin (ECOTRIN LOW STRENGTH) 81 mg EC tablet, Take 81 mg by mouth daily, Disp: , Rfl:     atorvastatin (LIPITOR) 20 mg tablet, TAKE 1 TABLET BY MOUTH EVERY DAY, Disp: 90 tablet, Rfl: 3    dapagliflozin (Farxiga) 10 MG tablet, Take 1 tablet (10 mg total) by mouth daily, Disp: 90 tablet, Rfl: 3    digoxin (LANOXIN) 0.125 mg tablet, TAKE 1 TABLET BY MOUTH EVERY DAY, Disp: 90 tablet, Rfl: 3    Eliquis 5 MG, TAKE 1 TABLET BY MOUTH TWICE A DAY, Disp: 90 tablet, Rfl: 3    Multiple Vitamin (Daily-Misael Multivitamin) TABS, TAKE 1 TABLET BY MOUTH EVERY DAY, Disp: 90 tablet, Rfl: 1    pantoprazole (PROTONIX) 40 mg tablet, Take 1 tablet (40 mg total) by mouth daily, Disp: 180 tablet, Rfl: 1    sacubitril-valsartan (Entresto) 49-51 MG TABS, Take 1 tablet by mouth 2 (two) times a day, Disp: 180 tablet, Rfl: 3    tamsulosin (FLOMAX) 0.4 mg, TAKE 1 CAPSULE BY MOUTH EVERY DAY WITH DINNER, Disp: 90 capsule, Rfl: 3  No Known Allergies    Labs:  No visits with results within 2 Month(s) from this visit.    Latest known visit with results is:   Appointment on 07/05/2023   Component Date Value    WBC 07/05/2023 3.54 (L)     RBC 07/05/2023 4.46     Hemoglobin 07/05/2023 12.7     Hematocrit 07/05/2023 41.1     MCV 07/05/2023 92     MCH 07/05/2023 28.5     MCHC 07/05/2023 30.9 (L)     RDW 07/05/2023 15.4 (H)     MPV 07/05/2023 12.5     Platelets 95/77/2537 148 (L)     nRBC 07/05/2023 0     Neutrophils Relative 07/05/2023 48     Immat GRANS % 07/05/2023 0     Lymphocytes Relative 07/05/2023 42     Monocytes Relative 07/05/2023 8     Eosinophils Relative 07/05/2023 1     Basophils Relative 07/05/2023 1     Neutrophils Absolute 07/05/2023 1.70 (L)     Immature Grans Absolute 07/05/2023 0.01     Lymphocytes Absolute 07/05/2023 1.48     Monocytes Absolute 07/05/2023 0.29     Eosinophils Absolute 07/05/2023 0.04     Basophils Absolute 07/05/2023 0.02     Sodium 07/05/2023 136     Potassium 07/05/2023 4.0     Chloride 07/05/2023 107     CO2 07/05/2023 28     ANION GAP 07/05/2023 1     BUN 07/05/2023 12     Creatinine 07/05/2023 1.15     Glucose, Fasting 07/05/2023 102 (H)     Calcium 07/05/2023 8.8     AST 07/05/2023 26     ALT 07/05/2023 38     Alkaline Phosphatase 07/05/2023 55     Total Protein 07/05/2023 9.2 (H)     Albumin 07/05/2023 3.6     Total Bilirubin 07/05/2023 1.09 (H)     eGFR 07/05/2023 58     Cholesterol 07/05/2023 135     Triglycerides 07/05/2023 78     HDL, Direct 07/05/2023 66     LDL Calculated 07/05/2023 53     Non-HDL-Chol (CHOL-HDL) 07/05/2023 69     Miscellaneous Lab Test R* 07/05/2023 see estelaen report      Imaging: No results found. Review of Systems:  Review of Systems    Physical Exam:  He is thin. Looks good. Blood pressure 118/74. Heart rate 74 and slightly irregular. Lungs are clear. Rhythm is regular. EKG rhythm strip today shows sinus rhythm with extrasystoles. Discussion/Summary:    1. History of ischemic cardiomyopathy-essentially resolved  2. Guideline medical therapy to be continued  3. Paroxysmal atrial fibrillation-controlled with amiodarone  4. Presence of AICD. Plan is to either remove when the battery is dead or replace with a smaller device more subcutaneously placed. 5.  Return in 6 months.       Avila Villalobos MD

## 2023-10-27 ENCOUNTER — HOSPITAL ENCOUNTER (OUTPATIENT)
Dept: VASCULAR ULTRASOUND | Facility: HOSPITAL | Age: 83
Discharge: HOME | End: 2023-10-27
Payer: COMMERCIAL

## 2023-10-27 ENCOUNTER — OFFICE VISIT (OUTPATIENT)
Dept: FAMILY MEDICINE CLINIC | Facility: CLINIC | Age: 83
End: 2023-10-27
Payer: COMMERCIAL

## 2023-10-27 VITALS
HEIGHT: 71 IN | WEIGHT: 164 LBS | DIASTOLIC BLOOD PRESSURE: 60 MMHG | HEART RATE: 86 BPM | SYSTOLIC BLOOD PRESSURE: 110 MMHG | RESPIRATION RATE: 16 BRPM | BODY MASS INDEX: 22.96 KG/M2 | TEMPERATURE: 97.5 F | OXYGEN SATURATION: 96 %

## 2023-10-27 DIAGNOSIS — D69.6 PLATELETS DECREASED (HCC): ICD-10-CM

## 2023-10-27 DIAGNOSIS — M79.601 PAIN IN BOTH UPPER EXTREMITIES: Primary | ICD-10-CM

## 2023-10-27 DIAGNOSIS — L81.9 DISCOLORATION OF SKIN OF HAND: ICD-10-CM

## 2023-10-27 DIAGNOSIS — R20.2 NUMBNESS AND TINGLING IN BOTH HANDS: ICD-10-CM

## 2023-10-27 DIAGNOSIS — M79.89 SWELLING OF BOTH HANDS: ICD-10-CM

## 2023-10-27 DIAGNOSIS — M79.602 PAIN IN BOTH UPPER EXTREMITIES: Primary | ICD-10-CM

## 2023-10-27 DIAGNOSIS — M79.601 PAIN IN BOTH UPPER EXTREMITIES: ICD-10-CM

## 2023-10-27 DIAGNOSIS — R20.0 NUMBNESS AND TINGLING IN BOTH HANDS: ICD-10-CM

## 2023-10-27 DIAGNOSIS — M79.602 PAIN IN BOTH UPPER EXTREMITIES: ICD-10-CM

## 2023-10-27 PROCEDURE — 93000 ELECTROCARDIOGRAM COMPLETE: CPT | Performed by: NURSE PRACTITIONER

## 2023-10-27 PROCEDURE — 93970 EXTREMITY STUDY: CPT

## 2023-10-27 PROCEDURE — 99214 OFFICE O/P EST MOD 30 MIN: CPT | Performed by: NURSE PRACTITIONER

## 2023-10-27 NOTE — PATIENT INSTRUCTIONS
Get Ultrasound done I both arms  If you start experiencing chest pain, shortness of breath, hand swelling, go to the emergency room    Deep Vein Thrombosis   WHAT YOU NEED TO KNOW:   Deep vein thrombosis (DVT) is a blood clot that forms in a deep vein of the body. The DVT can break into smaller pieces and travel to your lungs and cause a blockage called a pulmonary embolism. A PE can become life-threatening. It is important to go to all follow-up appointments and to take blood thinners as directed. This is especially important if you were discharged home from the emergency department. DISCHARGE INSTRUCTIONS:   Call your local emergency number (911 in the [address]) if:   You feel lightheaded, short of breath, and have chest pain. You cough up blood. Seek care immediately if:   Your arm or leg feels warm, tender, and painful. It may look swollen and red. Call your doctor or hematologist if:   You have questions or concerns about your condition or care. Medicines:   Blood thinners  help prevent blood clots. Clots can cause strokes, heart attacks, and death. Many types of blood thinners are available. Your healthcare provider will give you specific instructions for the type you are given. The following are general safety guidelines to follow while you are taking a blood thinner:    Watch for bleeding and bruising. Watch for bleeding from your gums or nose. Watch for blood in your urine and bowel movements. Use a soft washcloth on your skin, and a soft toothbrush to brush your teeth. This can keep your skin and gums from bleeding. If you shave, use an electric shaver. Do not play contact sports. Tell your dentist and other healthcare providers that you take a blood thinner. Wear a bracelet or necklace that says you take this medicine. Do not start or stop any other medicines or supplements unless your healthcare provider tells you to.  Many medicines and supplements cannot be used with blood thinners. Take your blood thinner exactly as prescribed by your healthcare provider. Do not skip does or take less than prescribed. Tell your provider right away if you forget to take your blood thinner, or if you take too much. Take your medicine as directed. Contact your healthcare provider if you think your medicine is not helping or if you have side effects. Tell your provider if you are allergic to any medicine. Keep a list of the medicines, vitamins, and herbs you take. Include the amounts, and when and why you take them. Bring the list or the pill bottles to follow-up visits. Carry your medicine list with you in case of an emergency. Manage a DVT:   Wear pressure stockings as directed. The stockings put pressure on your legs. This improves blood flow and helps prevent clots. Wear the stockings during the day. Do not wear them when you sleep. Elevate your legs above the level of your heart. Elevate your legs when you sit or lie down, as often as you can. This will help decrease swelling and pain. Prop your legs on pillows or blankets to keep them elevated comfortably. Prevent a DVT:   Exercise regularly to help increase your blood flow. Walking is a good low-impact exercise. Talk to your healthcare provider about the best exercise plan for you. Change your body position or move around often. Move and stretch in your seat several times each hour if you travel by car or work at a desk. In an airplane, get up and walk every hour. Move your legs by tightening and releasing your leg muscles while sitting. You can move your legs while sitting by raising and lowering your heels. Keep your toes on the floor while you do this. You can also raise and lower your toes while keeping your heels on the floor. Maintain a healthy weight. Ask your healthcare provider what a healthy weight is for you. Ask him or her to help you create a weight loss plan if you are overweight.     Do not smoke. Nicotine and other chemicals in cigarettes and cigars can damage blood vessels and make it more difficult to manage your DVT. Ask your healthcare provider for information if you currently smoke and need help to quit. E-cigarettes or smokeless tobacco still contain nicotine. Talk to your healthcare provider before you use these products. Ask about birth control if you are a woman who takes the pill. A birth control pill increases the risk for PE in certain women. The risk is higher if you are also older than 35, smoke cigarettes, or have a blood clotting disorder. Talk to your healthcare provider about other ways to prevent pregnancy, such as a cervical cap or intrauterine device (IUD). Follow up with your doctor or hematologist as directed: You may need to come in regularly for scans to check for blood clots. Your blood may be checked to see how long it takes to clot. Your doctor or specialist will tell you if you need to have this test and how often to have it. Write down your questions so you remember to ask them during your visits. © Copyright Elisha Goltz 2023 Information is for End User's use only and may not be sold, redistributed or otherwise used for commercial purposes. The above information is an  only. It is not intended as medical advice for individual conditions or treatments. Talk to your doctor, nurse or pharmacist before following any medical regimen to see if it is safe and effective for you.

## 2023-10-27 NOTE — PROGRESS NOTES
Name: Dagmar Alcantar      : 1940      MRN: [id number]  Encounter Provider: OSKAR Dowling  Encounter Date: 10/27/2023   Encounter department: [address]     1. Pain in both upper extremities  Assessment & Plan:  Pain, numbness, discoloration, swelling, tingling and sometimes cool to touch  in both upper extremities x 3 weeks. Worse at night. No decrease rom. EKG performed, compared to previous EKGS and results discussed with pt. US ordered stat. Pt educated on tylenol for pain, report to ED with worsening symptoms including chest pain, shortness of breath, complete absence of sensation of extremities. Return if symptoms do not improve. Orders:  -     VAS upper limb venous duplex scan, complete, bilateral; Future; Expected date: 10/27/2023    2. Numbness and tingling in both hands  -     VAS upper limb venous duplex scan, complete, bilateral; Future; Expected date: 10/27/2023  -     POCT ECG    3. Platelets decreased ([address])    4. Discoloration of skin of hand  -     VAS upper limb venous duplex scan, complete, bilateral; Future; Expected date: 10/27/2023    5. Swelling of both hands  -     VAS upper limb venous duplex scan, complete, bilateral; Future; Expected date: 10/27/2023         Subjective      Pt presents with bilateral hand pain, numbness, tingling , discoloration and cool to touch in bilateral upper extremities x 3 weeks. Denies any injury. Pt denies use of any medication for pain, sleeping on extremities, or trauma to areas. Pt reports having previous stent in left forearm. Pt has history of Afib, pacemaker      Review of Systems   Constitutional: Negative. HENT: Negative. Eyes: Negative. Respiratory: Negative. Cardiovascular: Negative. Gastrointestinal: Negative. Endocrine: Negative. Genitourinary: Negative. Musculoskeletal:  Positive for arthralgias, joint swelling and myalgias. Skin: Negative. Allergic/Immunologic: Negative. Neurological:  Positive for weakness, numbness and headaches. Negative for tremors and syncope. Hematological: Negative. Psychiatric/Behavioral: Negative. Current Outpatient Medications on File Prior to Visit   Medication Sig   • amiodarone 200 mg tablet TAKE 1 TABLET BY MOUTH EVERY DAY   • aspirin (ECOTRIN LOW STRENGTH) 81 mg EC tablet Take 81 mg by mouth daily   • atorvastatin (LIPITOR) 20 mg tablet TAKE 1 TABLET BY MOUTH EVERY DAY   • dapagliflozin (Farxiga) 10 MG tablet Take 1 tablet (10 mg total) by mouth daily   • digoxin (LANOXIN) 0.125 mg tablet TAKE 1 TABLET BY MOUTH EVERY DAY   • Eliquis 5 MG TAKE 1 TABLET BY MOUTH TWICE A DAY   • Multiple Vitamin (Daily-Misael Multivitamin) TABS TAKE 1 TABLET BY MOUTH EVERY DAY   • pantoprazole (PROTONIX) 40 mg tablet Take 1 tablet (40 mg total) by mouth daily   • sacubitril-valsartan (Entresto) 49-51 MG TABS Take 1 tablet by mouth 2 (two) times a day   • tamsulosin (FLOMAX) 0.4 mg TAKE 1 CAPSULE BY MOUTH EVERY DAY WITH DINNER       Objective     /60   Pulse 86   Temp 97.5 °F (36.4 °C)   Resp 16   Ht 5' 11" (1.803 m)   Wt 74.4 kg (164 lb)   SpO2 96%   BMI 22.87 kg/m²     Physical Exam  Vitals and nursing note reviewed. Constitutional:       Appearance: Normal appearance. He is well-developed. HENT:      Head: Normocephalic and atraumatic. Cardiovascular:      Rate and Rhythm: Normal rate and regular rhythm. Pulses: Normal pulses. Heart sounds: Normal heart sounds. Pulmonary:      Effort: Pulmonary effort is normal.      Breath sounds: Normal breath sounds. Abdominal:      Palpations: Abdomen is soft. Musculoskeletal:         General: Swelling (miko arms) and tenderness present. No deformity. Normal range of motion. Right wrist: Tenderness present. Left wrist: Tenderness present. Right hand: Tenderness present. No swelling. Normal range of motion. Decreased strength.  Decreased sensation. Left hand: Tenderness present. No swelling. Normal range of motion. Decreased strength. Decreased sensation. Normal capillary refill. Normal pulse. Skin:     General: Skin is warm and dry. Neurological:      General: No focal deficit present. Mental Status: He is alert and oriented to person, place, and time. Psychiatric:         Mood and Affect: Mood normal.         Behavior: Behavior normal.         Thought Content:  Thought content normal.         Judgment: Judgment normal.       OSKAR Clancy

## 2023-10-27 NOTE — ASSESSMENT & PLAN NOTE
Pain, numbness, discoloration, swelling, tingling and sometimes cool to touch  in both upper extremities x 3 weeks. Worse at night. No decrease rom. EKG performed, compared to previous EKGS and results discussed with pt. US ordered stat. Pt educated on tylenol for pain, report to ED with worsening symptoms including chest pain, shortness of breath, complete absence of sensation of extremities. Return if symptoms do not improve.

## 2024-07-15 LAB
CREAT UR-MCNC: 129.1 MG/DL
MICROALBUMIN UR-MCNC: <7 MG/L

## 2025-04-28 NOTE — TELEPHONE ENCOUNTER
The metoprolol was ordered back in March 90-day supply with 3 refills, so he should be on it, unless his cardiologist told him otherwise  [General Appearance - Alert] : alert [General Appearance - In No Acute Distress] : in no acute distress [Person] : oriented to person [Place] : oriented to place [Time] : oriented to time [Concentration Intact] : normal concentrating ability [Naming Objects] : no difficulty naming common objects [Fluency] : fluency intact [Comprehension] : comprehension intact [Vocabulary] : adequate range of vocabulary [Cranial Nerves Optic (II)] : visual acuity intact bilaterally,  visual fields full to confrontation, pupils equal round and reactive to light [Cranial Nerves Oculomotor (III)] : extraocular motion intact [Cranial Nerves Trigeminal (V)] : facial sensation intact symmetrically [Cranial Nerves Facial (VII)] : face symmetrical [Cranial Nerves Vestibulocochlear (VIII)] : hearing was intact bilaterally [Cranial Nerves Glossopharyngeal (IX)] : tongue and palate midline [Cranial Nerves Accessory (XI - Cranial And Spinal)] : head turning and shoulder shrug symmetric [Cranial Nerves Hypoglossal (XII)] : there was no tongue deviation with protrusion [Motor Tone] : muscle tone was normal in all four extremities [Involuntary Movements] : no involuntary movements were seen [No Muscle Atrophy] : normal bulk in all four extremities [Sensation Tactile Decrease] : light touch was intact [Sensation Pain / Temperature Decrease] : pain and temperature was intact [Sensation Vibration Decrease] : vibration was intact [Proprioception] : proprioception was intact [Abnormal Walk] : normal gait [Balance] : balance was intact [1+] : Ankle jerk left 1+ [Romberg's Sign] : Romberg's sign was negtive [Coordination - Dysmetria Impaired Finger-to-Nose Bilateral] : not present [Coordination - Dysmetria Impaired Heel-to-Shin Bilateral] : not present [Plantar Reflex Right Only] : normal on the right [Plantar Reflex Left Only] : normal on the left [FreeTextEntry5] : No twitches, no tongue atrophy, neck flexion and extension 5 out of 5 [FreeTextEntry6] : No muscle twitches  able to get up from chair without any difficulty And without requiring assistance. Proximal arm is 3/5 on the left and 4/5 right side and distal arm 5/5 bl arm; legs are 5/5 bl